# Patient Record
Sex: FEMALE | Race: WHITE | NOT HISPANIC OR LATINO | Employment: OTHER | URBAN - METROPOLITAN AREA
[De-identification: names, ages, dates, MRNs, and addresses within clinical notes are randomized per-mention and may not be internally consistent; named-entity substitution may affect disease eponyms.]

---

## 2017-02-07 ENCOUNTER — OUTSIDE FACILITY SERVICE (OUTPATIENT)
Dept: FAMILY MEDICINE CLINIC | Facility: CLINIC | Age: 82
End: 2017-02-07

## 2017-02-07 PROCEDURE — 99308 SBSQ NF CARE LOW MDM 20: CPT | Performed by: NURSE PRACTITIONER

## 2017-02-28 ENCOUNTER — OUTSIDE FACILITY SERVICE (OUTPATIENT)
Dept: FAMILY MEDICINE CLINIC | Facility: CLINIC | Age: 82
End: 2017-02-28

## 2017-02-28 PROCEDURE — 99308 SBSQ NF CARE LOW MDM 20: CPT | Performed by: INTERNAL MEDICINE

## 2017-03-20 ENCOUNTER — OUTSIDE FACILITY SERVICE (OUTPATIENT)
Dept: FAMILY MEDICINE CLINIC | Facility: CLINIC | Age: 82
End: 2017-03-20

## 2017-03-20 PROCEDURE — 99308 SBSQ NF CARE LOW MDM 20: CPT | Performed by: NURSE PRACTITIONER

## 2017-04-21 ENCOUNTER — OUTSIDE FACILITY SERVICE (OUTPATIENT)
Dept: FAMILY MEDICINE CLINIC | Facility: CLINIC | Age: 82
End: 2017-04-21

## 2017-04-21 PROCEDURE — 99308 SBSQ NF CARE LOW MDM 20: CPT | Performed by: NURSE PRACTITIONER

## 2017-04-25 ENCOUNTER — OUTSIDE FACILITY SERVICE (OUTPATIENT)
Dept: FAMILY MEDICINE CLINIC | Facility: CLINIC | Age: 82
End: 2017-04-25

## 2017-04-25 PROCEDURE — 99308 SBSQ NF CARE LOW MDM 20: CPT | Performed by: INTERNAL MEDICINE

## 2017-06-15 ENCOUNTER — OUTSIDE FACILITY SERVICE (OUTPATIENT)
Dept: FAMILY MEDICINE CLINIC | Facility: CLINIC | Age: 82
End: 2017-06-15

## 2017-06-15 PROCEDURE — 99307 SBSQ NF CARE SF MDM 10: CPT | Performed by: NURSE PRACTITIONER

## 2017-08-02 ENCOUNTER — APPOINTMENT (OUTPATIENT)
Dept: WOMENS IMAGING | Facility: HOSPITAL | Age: 82
End: 2017-08-02

## 2017-08-02 PROCEDURE — 77063 BREAST TOMOSYNTHESIS BI: CPT | Performed by: RADIOLOGY

## 2017-08-02 PROCEDURE — G0202 SCR MAMMO BI INCL CAD: HCPCS | Performed by: RADIOLOGY

## 2017-10-26 ENCOUNTER — APPOINTMENT (OUTPATIENT)
Dept: CARDIOLOGY | Facility: HOSPITAL | Age: 82
End: 2017-10-26
Attending: PSYCHIATRY & NEUROLOGY

## 2017-10-26 ENCOUNTER — APPOINTMENT (OUTPATIENT)
Dept: CT IMAGING | Facility: HOSPITAL | Age: 82
End: 2017-10-26

## 2017-10-26 ENCOUNTER — HOSPITAL ENCOUNTER (INPATIENT)
Facility: HOSPITAL | Age: 82
LOS: 10 days | Discharge: NURSING, REHAB OR LONG-TERM CARE | End: 2017-11-05
Attending: EMERGENCY MEDICINE | Admitting: INTERNAL MEDICINE

## 2017-10-26 ENCOUNTER — APPOINTMENT (OUTPATIENT)
Dept: GENERAL RADIOLOGY | Facility: HOSPITAL | Age: 82
End: 2017-10-26

## 2017-10-26 DIAGNOSIS — K76.82 HEPATIC ENCEPHALOPATHY (HCC): Primary | ICD-10-CM

## 2017-10-26 DIAGNOSIS — R53.1 GENERALIZED WEAKNESS: ICD-10-CM

## 2017-10-26 PROBLEM — I10 HYPERTENSION: Status: ACTIVE | Noted: 2017-10-26

## 2017-10-26 PROBLEM — K74.60 CIRRHOSIS OF LIVER (HCC): Status: ACTIVE | Noted: 2017-10-26

## 2017-10-26 PROBLEM — I48.91 ATRIAL FIBRILLATION (HCC): Status: ACTIVE | Noted: 2017-10-26

## 2017-10-26 PROBLEM — N39.0 UTI (URINARY TRACT INFECTION): Status: ACTIVE | Noted: 2017-10-26

## 2017-10-26 LAB
ALBUMIN SERPL-MCNC: 3.3 G/DL (ref 3.5–5.2)
ALBUMIN/GLOB SERPL: 0.8 G/DL
ALP SERPL-CCNC: 142 U/L (ref 39–117)
ALT SERPL W P-5'-P-CCNC: 17 U/L (ref 1–33)
AMMONIA BLD-SCNC: 138 UMOL/L (ref 11–51)
ANION GAP SERPL CALCULATED.3IONS-SCNC: 12.3 MMOL/L
APTT PPP: 34.6 SECONDS (ref 22.7–35.4)
AST SERPL-CCNC: 31 U/L (ref 1–32)
BACTERIA UR QL AUTO: ABNORMAL /HPF
BASOPHILS # BLD AUTO: 0.04 10*3/MM3 (ref 0–0.2)
BASOPHILS NFR BLD AUTO: 0.5 % (ref 0–1.5)
BH CV XLRA MEAS LEFT DIST CCA EDV: 16.7 CM/SEC
BH CV XLRA MEAS LEFT DIST CCA PSV: 87.4 CM/SEC
BH CV XLRA MEAS LEFT DIST ICA EDV: -14.7 CM/SEC
BH CV XLRA MEAS LEFT DIST ICA PSV: -71.7 CM/SEC
BH CV XLRA MEAS LEFT ICA/CCA RATIO: 0.8
BH CV XLRA MEAS LEFT MID ICA EDV: -10.8 CM/SEC
BH CV XLRA MEAS LEFT MID ICA PSV: -72.7 CM/SEC
BH CV XLRA MEAS LEFT PROX CCA EDV: 10.8 CM/SEC
BH CV XLRA MEAS LEFT PROX CCA PSV: 92.3 CM/SEC
BH CV XLRA MEAS LEFT PROX ECA PSV: -120 CM/SEC
BH CV XLRA MEAS LEFT PROX ICA EDV: -14.7 CM/SEC
BH CV XLRA MEAS LEFT PROX ICA PSV: -64.8 CM/SEC
BH CV XLRA MEAS LEFT PROX SCLA PSV: 170 CM/SEC
BH CV XLRA MEAS LEFT VERTEBRAL A EDV: 10.8 CM/SEC
BH CV XLRA MEAS LEFT VERTEBRAL A PSV: 49.1 CM/SEC
BH CV XLRA MEAS RIGHT DIST CCA EDV: 13.8 CM/SEC
BH CV XLRA MEAS RIGHT DIST CCA PSV: 85.4 CM/SEC
BH CV XLRA MEAS RIGHT ICA/CCA RATIO: 0.7
BH CV XLRA MEAS RIGHT MID ICA EDV: -14.7 CM/SEC
BH CV XLRA MEAS RIGHT MID ICA PSV: -54 CM/SEC
BH CV XLRA MEAS RIGHT PROX CCA EDV: 13.8 CM/SEC
BH CV XLRA MEAS RIGHT PROX CCA PSV: 124 CM/SEC
BH CV XLRA MEAS RIGHT PROX ECA EDV: -12.8 CM/SEC
BH CV XLRA MEAS RIGHT PROX ECA PSV: -126 CM/SEC
BH CV XLRA MEAS RIGHT PROX ICA EDV: -9.8 CM/SEC
BH CV XLRA MEAS RIGHT PROX ICA PSV: -61.9 CM/SEC
BH CV XLRA MEAS RIGHT PROX SCLA PSV: 141 CM/SEC
BH CV XLRA MEAS RIGHT VERTEBRAL A EDV: 11.8 CM/SEC
BH CV XLRA MEAS RIGHT VERTEBRAL A PSV: 48.1 CM/SEC
BILIRUB SERPL-MCNC: 1.5 MG/DL (ref 0.1–1.2)
BILIRUB UR QL STRIP: NEGATIVE
BUN BLD-MCNC: 16 MG/DL (ref 8–23)
BUN/CREAT SERPL: 15.4 (ref 7–25)
CALCIUM SPEC-SCNC: 10.1 MG/DL (ref 8.6–10.5)
CHLORIDE SERPL-SCNC: 103 MMOL/L (ref 98–107)
CHOLEST SERPL-MCNC: 190 MG/DL (ref 0–200)
CLARITY UR: ABNORMAL
CO2 SERPL-SCNC: 26.7 MMOL/L (ref 22–29)
COLOR UR: YELLOW
CREAT BLD-MCNC: 1.04 MG/DL (ref 0.57–1)
DEPRECATED RDW RBC AUTO: 51.7 FL (ref 37–54)
EOSINOPHIL # BLD AUTO: 0.47 10*3/MM3 (ref 0–0.7)
EOSINOPHIL NFR BLD AUTO: 5.7 % (ref 0.3–6.2)
ERYTHROCYTE [DISTWIDTH] IN BLOOD BY AUTOMATED COUNT: 13.9 % (ref 11.7–13)
GFR SERPL CREATININE-BSD FRML MDRD: 51 ML/MIN/1.73
GLOBULIN UR ELPH-MCNC: 4 GM/DL
GLUCOSE BLD-MCNC: 103 MG/DL (ref 65–99)
GLUCOSE BLDC GLUCOMTR-MCNC: 113 MG/DL (ref 70–130)
GLUCOSE BLDC GLUCOMTR-MCNC: 131 MG/DL (ref 70–130)
GLUCOSE UR STRIP-MCNC: NEGATIVE MG/DL
HBA1C MFR BLD: 5.17 % (ref 4.8–5.6)
HCT VFR BLD AUTO: 38.7 % (ref 35.6–45.5)
HDLC SERPL-MCNC: 75 MG/DL (ref 40–60)
HGB BLD-MCNC: 13.2 G/DL (ref 11.9–15.5)
HGB UR QL STRIP.AUTO: ABNORMAL
HOLD SPECIMEN: NORMAL
HOLD SPECIMEN: NORMAL
HYALINE CASTS UR QL AUTO: ABNORMAL /LPF
IMM GRANULOCYTES # BLD: 0.03 10*3/MM3 (ref 0–0.03)
IMM GRANULOCYTES NFR BLD: 0.4 % (ref 0–0.5)
INR PPP: 1.39 (ref 0.9–1.1)
KETONES UR QL STRIP: NEGATIVE
LDLC SERPL CALC-MCNC: 105 MG/DL (ref 0–100)
LDLC/HDLC SERPL: 1.4 {RATIO}
LEFT ARM BP: NORMAL MMHG
LEUKOCYTE ESTERASE UR QL STRIP.AUTO: ABNORMAL
LYMPHOCYTES # BLD AUTO: 2.5 10*3/MM3 (ref 0.9–4.8)
LYMPHOCYTES NFR BLD AUTO: 30.3 % (ref 19.6–45.3)
MCH RBC QN AUTO: 34.6 PG (ref 26.9–32)
MCHC RBC AUTO-ENTMCNC: 34.1 G/DL (ref 32.4–36.3)
MCV RBC AUTO: 101.3 FL (ref 80.5–98.2)
MONOCYTES # BLD AUTO: 1.12 10*3/MM3 (ref 0.2–1.2)
MONOCYTES NFR BLD AUTO: 13.6 % (ref 5–12)
NEUTROPHILS # BLD AUTO: 4.09 10*3/MM3 (ref 1.9–8.1)
NEUTROPHILS NFR BLD AUTO: 49.5 % (ref 42.7–76)
NITRITE UR QL STRIP: NEGATIVE
NRBC BLD MANUAL-RTO: 0 /100 WBC (ref 0–0)
PH UR STRIP.AUTO: 8 [PH] (ref 5–8)
PLATELET # BLD AUTO: 161 10*3/MM3 (ref 140–500)
PMV BLD AUTO: 11.3 FL (ref 6–12)
POTASSIUM BLD-SCNC: 3.9 MMOL/L (ref 3.5–5.2)
PROT SERPL-MCNC: 7.3 G/DL (ref 6–8.5)
PROT UR QL STRIP: ABNORMAL
PROTHROMBIN TIME: 16.5 SECONDS (ref 11.7–14.2)
RBC # BLD AUTO: 3.82 10*6/MM3 (ref 3.9–5.2)
RBC # UR: ABNORMAL /HPF
REF LAB TEST METHOD: ABNORMAL
RIGHT ARM BP: NORMAL MMHG
SODIUM BLD-SCNC: 142 MMOL/L (ref 136–145)
SP GR UR STRIP: 1.01 (ref 1–1.03)
SQUAMOUS #/AREA URNS HPF: ABNORMAL /HPF
TRIGL SERPL-MCNC: 50 MG/DL (ref 0–150)
TROPONIN T SERPL-MCNC: <0.01 NG/ML (ref 0–0.03)
TSH SERPL DL<=0.05 MIU/L-ACNC: 2.34 MIU/ML (ref 0.27–4.2)
UROBILINOGEN UR QL STRIP: ABNORMAL
VIT B12 BLD-MCNC: 866 PG/ML (ref 211–946)
VLDLC SERPL-MCNC: 10 MG/DL (ref 5–40)
WBC NRBC COR # BLD: 8.25 10*3/MM3 (ref 4.5–10.7)
WBC UR QL AUTO: ABNORMAL /HPF
WHOLE BLOOD HOLD SPECIMEN: NORMAL
WHOLE BLOOD HOLD SPECIMEN: NORMAL

## 2017-10-26 PROCEDURE — 87186 SC STD MICRODIL/AGAR DIL: CPT | Performed by: EMERGENCY MEDICINE

## 2017-10-26 PROCEDURE — 99285 EMERGENCY DEPT VISIT HI MDM: CPT

## 2017-10-26 PROCEDURE — 84484 ASSAY OF TROPONIN QUANT: CPT | Performed by: EMERGENCY MEDICINE

## 2017-10-26 PROCEDURE — 82607 VITAMIN B-12: CPT | Performed by: PSYCHIATRY & NEUROLOGY

## 2017-10-26 PROCEDURE — 82140 ASSAY OF AMMONIA: CPT | Performed by: EMERGENCY MEDICINE

## 2017-10-26 PROCEDURE — 71010 HC CHEST PA OR AP: CPT

## 2017-10-26 PROCEDURE — 70450 CT HEAD/BRAIN W/O DYE: CPT

## 2017-10-26 PROCEDURE — 93005 ELECTROCARDIOGRAM TRACING: CPT | Performed by: EMERGENCY MEDICINE

## 2017-10-26 PROCEDURE — 80053 COMPREHEN METABOLIC PANEL: CPT | Performed by: EMERGENCY MEDICINE

## 2017-10-26 PROCEDURE — 85730 THROMBOPLASTIN TIME PARTIAL: CPT | Performed by: EMERGENCY MEDICINE

## 2017-10-26 PROCEDURE — 85025 COMPLETE CBC W/AUTO DIFF WBC: CPT | Performed by: EMERGENCY MEDICINE

## 2017-10-26 PROCEDURE — 99222 1ST HOSP IP/OBS MODERATE 55: CPT | Performed by: PSYCHIATRY & NEUROLOGY

## 2017-10-26 PROCEDURE — 93010 ELECTROCARDIOGRAM REPORT: CPT | Performed by: INTERNAL MEDICINE

## 2017-10-26 PROCEDURE — 81001 URINALYSIS AUTO W/SCOPE: CPT | Performed by: EMERGENCY MEDICINE

## 2017-10-26 PROCEDURE — 83036 HEMOGLOBIN GLYCOSYLATED A1C: CPT | Performed by: PSYCHIATRY & NEUROLOGY

## 2017-10-26 PROCEDURE — 85610 PROTHROMBIN TIME: CPT | Performed by: EMERGENCY MEDICINE

## 2017-10-26 PROCEDURE — 82962 GLUCOSE BLOOD TEST: CPT

## 2017-10-26 PROCEDURE — 92610 EVALUATE SWALLOWING FUNCTION: CPT

## 2017-10-26 PROCEDURE — 80061 LIPID PANEL: CPT | Performed by: PSYCHIATRY & NEUROLOGY

## 2017-10-26 PROCEDURE — 84443 ASSAY THYROID STIM HORMONE: CPT | Performed by: PSYCHIATRY & NEUROLOGY

## 2017-10-26 PROCEDURE — 93880 EXTRACRANIAL BILAT STUDY: CPT

## 2017-10-26 PROCEDURE — 25010000002 LEVOFLOXACIN PER 250 MG: Performed by: INTERNAL MEDICINE

## 2017-10-26 PROCEDURE — 87086 URINE CULTURE/COLONY COUNT: CPT | Performed by: EMERGENCY MEDICINE

## 2017-10-26 RX ORDER — MULTIPLE VITAMINS W/ MINERALS TAB 9MG-400MCG
1 TAB ORAL DAILY
Status: DISCONTINUED | OUTPATIENT
Start: 2017-10-27 | End: 2017-11-05 | Stop reason: HOSPADM

## 2017-10-26 RX ORDER — DEXTROSE MONOHYDRATE 25 G/50ML
INJECTION, SOLUTION INTRAVENOUS
Status: COMPLETED
Start: 2017-10-26 | End: 2017-10-26

## 2017-10-26 RX ORDER — LACTULOSE 10 G/15ML
30 SOLUTION ORAL 3 TIMES DAILY
Status: DISCONTINUED | OUTPATIENT
Start: 2017-10-26 | End: 2017-10-26 | Stop reason: SDUPTHER

## 2017-10-26 RX ORDER — ONDANSETRON 8 MG/1
8 TABLET, ORALLY DISINTEGRATING ORAL EVERY 8 HOURS PRN
COMMUNITY
End: 2017-11-05 | Stop reason: HOSPADM

## 2017-10-26 RX ORDER — LORATADINE 10 MG/1
10 CAPSULE, LIQUID FILLED ORAL DAILY
COMMUNITY
End: 2017-11-05 | Stop reason: HOSPADM

## 2017-10-26 RX ORDER — METOPROLOL SUCCINATE 25 MG/1
12.5 TABLET, EXTENDED RELEASE ORAL EVERY 12 HOURS
COMMUNITY

## 2017-10-26 RX ORDER — LATANOPROST 50 UG/ML
1 SOLUTION/ DROPS OPHTHALMIC NIGHTLY
COMMUNITY
End: 2019-01-04

## 2017-10-26 RX ORDER — ACETAMINOPHEN 325 MG/1
650 TABLET ORAL EVERY 6 HOURS PRN
COMMUNITY
End: 2017-11-05 | Stop reason: HOSPADM

## 2017-10-26 RX ORDER — POTASSIUM CHLORIDE 750 MG/1
20 TABLET, FILM COATED, EXTENDED RELEASE ORAL 2 TIMES DAILY
COMMUNITY
End: 2017-11-05 | Stop reason: HOSPADM

## 2017-10-26 RX ORDER — CHOLECALCIFEROL (VITAMIN D3) 1250 MCG
50000 CAPSULE ORAL
COMMUNITY
End: 2019-01-04

## 2017-10-26 RX ORDER — METOPROLOL SUCCINATE 25 MG/1
12.5 TABLET, EXTENDED RELEASE ORAL EVERY 12 HOURS
Status: DISCONTINUED | OUTPATIENT
Start: 2017-10-26 | End: 2017-11-05 | Stop reason: HOSPADM

## 2017-10-26 RX ORDER — LATANOPROST 50 UG/ML
1 SOLUTION/ DROPS OPHTHALMIC NIGHTLY
Status: DISCONTINUED | OUTPATIENT
Start: 2017-10-26 | End: 2017-11-05 | Stop reason: HOSPADM

## 2017-10-26 RX ORDER — ACETAMINOPHEN 325 MG/1
650 TABLET ORAL EVERY 6 HOURS PRN
Status: DISCONTINUED | OUTPATIENT
Start: 2017-10-26 | End: 2017-11-05 | Stop reason: HOSPADM

## 2017-10-26 RX ORDER — LOSARTAN POTASSIUM 50 MG/1
75 TABLET ORAL DAILY
COMMUNITY
End: 2017-11-05 | Stop reason: HOSPADM

## 2017-10-26 RX ORDER — ONDANSETRON 2 MG/ML
4 INJECTION INTRAMUSCULAR; INTRAVENOUS EVERY 6 HOURS PRN
Status: DISCONTINUED | OUTPATIENT
Start: 2017-10-26 | End: 2017-11-05 | Stop reason: HOSPADM

## 2017-10-26 RX ORDER — FUROSEMIDE 40 MG/1
40 TABLET ORAL EVERY OTHER DAY
COMMUNITY
End: 2017-11-05 | Stop reason: HOSPADM

## 2017-10-26 RX ORDER — LACTULOSE 10 G/15ML
30 SOLUTION ORAL 3 TIMES DAILY
Status: DISCONTINUED | OUTPATIENT
Start: 2017-10-26 | End: 2017-11-05 | Stop reason: HOSPADM

## 2017-10-26 RX ORDER — OMEPRAZOLE 20 MG/1
20 CAPSULE, DELAYED RELEASE ORAL DAILY
COMMUNITY
End: 2019-01-04

## 2017-10-26 RX ORDER — DEXTROSE MONOHYDRATE 25 G/50ML
25 INJECTION, SOLUTION INTRAVENOUS ONCE
Status: COMPLETED | OUTPATIENT
Start: 2017-10-26 | End: 2017-10-26

## 2017-10-26 RX ORDER — SODIUM CHLORIDE 0.9 % (FLUSH) 0.9 %
10 SYRINGE (ML) INJECTION AS NEEDED
Status: DISCONTINUED | OUTPATIENT
Start: 2017-10-26 | End: 2017-11-05 | Stop reason: HOSPADM

## 2017-10-26 RX ORDER — METOLAZONE 5 MG/1
5 TABLET ORAL DAILY
COMMUNITY
End: 2017-11-05 | Stop reason: HOSPADM

## 2017-10-26 RX ORDER — PANTOPRAZOLE SODIUM 40 MG/1
40 TABLET, DELAYED RELEASE ORAL EVERY MORNING
Status: DISCONTINUED | OUTPATIENT
Start: 2017-10-27 | End: 2017-11-05 | Stop reason: HOSPADM

## 2017-10-26 RX ORDER — LEVOFLOXACIN 5 MG/ML
500 INJECTION, SOLUTION INTRAVENOUS EVERY 24 HOURS
Status: COMPLETED | OUTPATIENT
Start: 2017-10-26 | End: 2017-10-27

## 2017-10-26 RX ORDER — LACTULOSE 10 G/15ML
20 SOLUTION ORAL 3 TIMES DAILY
COMMUNITY
End: 2017-11-05 | Stop reason: HOSPADM

## 2017-10-26 RX ADMIN — METOPROLOL SUCCINATE 12.5 MG: 25 TABLET, FILM COATED, EXTENDED RELEASE ORAL at 21:54

## 2017-10-26 RX ADMIN — RIFAXIMIN 550 MG: 550 TABLET ORAL at 21:54

## 2017-10-26 RX ADMIN — APIXABAN 5 MG: 2.5 TABLET, FILM COATED ORAL at 22:18

## 2017-10-26 RX ADMIN — LACTULOSE 30 G: 10 SOLUTION ORAL at 21:55

## 2017-10-26 RX ADMIN — DEXTROSE MONOHYDRATE 25 ML: 25 INJECTION, SOLUTION INTRAVENOUS at 12:25

## 2017-10-26 RX ADMIN — LEVOFLOXACIN 500 MG: 5 INJECTION, SOLUTION INTRAVENOUS at 16:37

## 2017-10-26 RX ADMIN — LACTULOSE: 10 SOLUTION ORAL at 13:26

## 2017-10-27 ENCOUNTER — APPOINTMENT (OUTPATIENT)
Dept: CARDIOLOGY | Facility: HOSPITAL | Age: 82
End: 2017-10-27
Attending: PSYCHIATRY & NEUROLOGY

## 2017-10-27 PROBLEM — Z66 DNR (DO NOT RESUSCITATE): Status: ACTIVE | Noted: 2017-10-27

## 2017-10-27 LAB
BH CV ECHO MEAS - AO MEAN PG (FULL): 3 MMHG
BH CV ECHO MEAS - AO MEAN PG: 6 MMHG
BH CV ECHO MEAS - AO V2 MAX: 178 CM/SEC
BH CV ECHO MEAS - AO V2 MEAN: 113 CM/SEC
BH CV ECHO MEAS - AO V2 VTI: 35.2 CM
BH CV ECHO MEAS - AVA(I,A): 2.1 CM^2
BH CV ECHO MEAS - AVA(I,D): 2.1 CM^2
BH CV ECHO MEAS - BSA(HAYCOCK): 2 M^2
BH CV ECHO MEAS - BSA: 1.9 M^2
BH CV ECHO MEAS - BZI_BMI: 36.2 KILOGRAMS/M^2
BH CV ECHO MEAS - BZI_METRIC_HEIGHT: 157.5 CM
BH CV ECHO MEAS - BZI_METRIC_WEIGHT: 89.8 KG
BH CV ECHO MEAS - CONTRAST EF (2CH): 64.6 ML/M^2
BH CV ECHO MEAS - CONTRAST EF 4CH: 65.8 ML/M^2
BH CV ECHO MEAS - EDV(MOD-SP2): 48 ML
BH CV ECHO MEAS - EDV(MOD-SP4): 76 ML
BH CV ECHO MEAS - EF(MOD-SP2): 64.6 %
BH CV ECHO MEAS - EF(MOD-SP4): 65.8 %
BH CV ECHO MEAS - ESV(MOD-SP2): 17 ML
BH CV ECHO MEAS - ESV(MOD-SP4): 26 ML
BH CV ECHO MEAS - LAT PEAK E' VEL: 7 CM/SEC
BH CV ECHO MEAS - LV DIASTOLIC VOL/BSA (35-75): 39.9 ML/M^2
BH CV ECHO MEAS - LV MEAN PG: 3 MMHG
BH CV ECHO MEAS - LV SYSTOLIC VOL/BSA (12-30): 13.7 ML/M^2
BH CV ECHO MEAS - LV V1 MAX: 150 CM/SEC
BH CV ECHO MEAS - LV V1 MEAN: 84.9 CM/SEC
BH CV ECHO MEAS - LV V1 VTI: 32.3 CM
BH CV ECHO MEAS - LVLD AP2: 7.4 CM
BH CV ECHO MEAS - LVLD AP4: 8 CM
BH CV ECHO MEAS - LVLS AP2: 6.3 CM
BH CV ECHO MEAS - LVLS AP4: 6 CM
BH CV ECHO MEAS - LVOT AREA (M): 2.3 CM^2
BH CV ECHO MEAS - LVOT AREA: 2.3 CM^2
BH CV ECHO MEAS - LVOT DIAM: 1.7 CM
BH CV ECHO MEAS - MED PEAK E' VEL: 7 CM/SEC
BH CV ECHO MEAS - MV A DUR: 148 SEC
BH CV ECHO MEAS - MV A MAX VEL: 96.7 CM/SEC
BH CV ECHO MEAS - MV DEC SLOPE: 400 CM/SEC^2
BH CV ECHO MEAS - MV DEC TIME: 232 SEC
BH CV ECHO MEAS - MV E MAX VEL: 148 CM/SEC
BH CV ECHO MEAS - MV E/A: 1.5
BH CV ECHO MEAS - MV MEAN PG: 3 MMHG
BH CV ECHO MEAS - MV P1/2T MAX VEL: 148 CM/SEC
BH CV ECHO MEAS - MV P1/2T: 108.4 MSEC
BH CV ECHO MEAS - MV V2 MEAN: 71.8 CM/SEC
BH CV ECHO MEAS - MV V2 VTI: 56.8 CM
BH CV ECHO MEAS - MVA P1/2T LCG: 1.5 CM^2
BH CV ECHO MEAS - MVA(P1/2T): 2 CM^2
BH CV ECHO MEAS - MVA(VTI): 1.3 CM^2
BH CV ECHO MEAS - PULM A REVS DUR: 95 SEC
BH CV ECHO MEAS - PULM A REVS VEL: 42 CM/SEC
BH CV ECHO MEAS - PULM DIAS VEL: 50.3 CM/SEC
BH CV ECHO MEAS - PULM S/D: 1.3
BH CV ECHO MEAS - PULM SYS VEL: 65.6 CM/SEC
BH CV ECHO MEAS - RAP SYSTOLE: 8 MMHG
BH CV ECHO MEAS - RVSP: 38 MMHG
BH CV ECHO MEAS - SI(LVOT): 38.5 ML/M^2
BH CV ECHO MEAS - SI(MOD-SP2): 16.3 ML/M^2
BH CV ECHO MEAS - SI(MOD-SP4): 26.3 ML/M^2
BH CV ECHO MEAS - SV(LVOT): 73.3 ML
BH CV ECHO MEAS - SV(MOD-SP2): 31 ML
BH CV ECHO MEAS - SV(MOD-SP4): 50 ML
BH CV ECHO MEAS - TAPSE (>1.6): 2.5 CM2
BH CV ECHO MEAS - TR MAX VEL: 276 CM/SEC
BH CV VAS BP RIGHT ARM: NORMAL MMHG
BH CV XLRA - RV BASE: 4.3 CM
BH CV XLRA - TDI S': 13 CM/SEC
E/E' RATIO: 22
GLUCOSE BLDC GLUCOMTR-MCNC: 108 MG/DL (ref 70–130)
LEFT ATRIUM VOLUME INDEX: 23.7 ML/M2
LV EF 2D ECHO EST: 66 %

## 2017-10-27 PROCEDURE — 93306 TTE W/DOPPLER COMPLETE: CPT

## 2017-10-27 PROCEDURE — 93303 ECHO TRANSTHORACIC: CPT | Performed by: INTERNAL MEDICINE

## 2017-10-27 PROCEDURE — 92526 ORAL FUNCTION THERAPY: CPT | Performed by: SPEECH-LANGUAGE PATHOLOGIST

## 2017-10-27 PROCEDURE — 99232 SBSQ HOSP IP/OBS MODERATE 35: CPT | Performed by: NURSE PRACTITIONER

## 2017-10-27 PROCEDURE — 25010000002 LEVOFLOXACIN PER 250 MG: Performed by: INTERNAL MEDICINE

## 2017-10-27 PROCEDURE — 82962 GLUCOSE BLOOD TEST: CPT

## 2017-10-27 RX ORDER — LEVOFLOXACIN 500 MG/1
500 TABLET, FILM COATED ORAL DAILY
Status: DISCONTINUED | OUTPATIENT
Start: 2017-10-28 | End: 2017-10-28

## 2017-10-27 RX ADMIN — LACTULOSE 30 G: 10 SOLUTION ORAL at 18:06

## 2017-10-27 RX ADMIN — METOPROLOL SUCCINATE 12.5 MG: 25 TABLET, FILM COATED, EXTENDED RELEASE ORAL at 21:58

## 2017-10-27 RX ADMIN — LEVOFLOXACIN 500 MG: 5 INJECTION, SOLUTION INTRAVENOUS at 18:06

## 2017-10-27 RX ADMIN — LACTULOSE 30 G: 10 SOLUTION ORAL at 09:37

## 2017-10-27 RX ADMIN — RIFAXIMIN 550 MG: 550 TABLET ORAL at 09:37

## 2017-10-27 RX ADMIN — APIXABAN 5 MG: 2.5 TABLET, FILM COATED ORAL at 18:06

## 2017-10-27 RX ADMIN — MULTIPLE VITAMINS W/ MINERALS TAB 1 TABLET: TAB at 09:37

## 2017-10-27 RX ADMIN — RIFAXIMIN 550 MG: 550 TABLET ORAL at 21:59

## 2017-10-27 RX ADMIN — METOPROLOL SUCCINATE 12.5 MG: 25 TABLET, FILM COATED, EXTENDED RELEASE ORAL at 09:37

## 2017-10-27 RX ADMIN — LACTULOSE 30 G: 10 SOLUTION ORAL at 21:58

## 2017-10-27 RX ADMIN — LATANOPROST 1 DROP: 50 SOLUTION OPHTHALMIC at 00:50

## 2017-10-27 RX ADMIN — PANTOPRAZOLE SODIUM 40 MG: 40 TABLET, DELAYED RELEASE ORAL at 06:43

## 2017-10-27 RX ADMIN — LATANOPROST 1 DROP: 50 SOLUTION OPHTHALMIC at 21:58

## 2017-10-27 RX ADMIN — APIXABAN 5 MG: 2.5 TABLET, FILM COATED ORAL at 09:37

## 2017-10-28 LAB
ALBUMIN SERPL-MCNC: 2.8 G/DL (ref 3.5–5.2)
ALBUMIN/GLOB SERPL: 0.8 G/DL
ALP SERPL-CCNC: 95 U/L (ref 39–117)
ALT SERPL W P-5'-P-CCNC: 14 U/L (ref 1–33)
AMMONIA BLD-SCNC: 60 UMOL/L (ref 11–51)
ANION GAP SERPL CALCULATED.3IONS-SCNC: 15.7 MMOL/L
AST SERPL-CCNC: 24 U/L (ref 1–32)
BACTERIA SPEC AEROBE CULT: ABNORMAL
BACTERIA SPEC AEROBE CULT: ABNORMAL
BILIRUB SERPL-MCNC: 1.7 MG/DL (ref 0.1–1.2)
BUN BLD-MCNC: 21 MG/DL (ref 8–23)
BUN/CREAT SERPL: 17.8 (ref 7–25)
CALCIUM SPEC-SCNC: 10 MG/DL (ref 8.6–10.5)
CHLORIDE SERPL-SCNC: 106 MMOL/L (ref 98–107)
CO2 SERPL-SCNC: 23.3 MMOL/L (ref 22–29)
CREAT BLD-MCNC: 1.18 MG/DL (ref 0.57–1)
DEPRECATED RDW RBC AUTO: 52 FL (ref 37–54)
ERYTHROCYTE [DISTWIDTH] IN BLOOD BY AUTOMATED COUNT: 13.7 % (ref 11.7–13)
GFR SERPL CREATININE-BSD FRML MDRD: 44 ML/MIN/1.73
GLOBULIN UR ELPH-MCNC: 3.7 GM/DL
GLUCOSE BLD-MCNC: 139 MG/DL (ref 65–99)
GLUCOSE BLDC GLUCOMTR-MCNC: 104 MG/DL (ref 70–130)
GLUCOSE BLDC GLUCOMTR-MCNC: 121 MG/DL (ref 70–130)
GLUCOSE BLDC GLUCOMTR-MCNC: 127 MG/DL (ref 70–130)
GLUCOSE BLDC GLUCOMTR-MCNC: 136 MG/DL (ref 70–130)
GLUCOSE BLDC GLUCOMTR-MCNC: 138 MG/DL (ref 70–130)
HCT VFR BLD AUTO: 33.2 % (ref 35.6–45.5)
HGB BLD-MCNC: 11.1 G/DL (ref 11.9–15.5)
MCH RBC QN AUTO: 34.7 PG (ref 26.9–32)
MCHC RBC AUTO-ENTMCNC: 33.4 G/DL (ref 32.4–36.3)
MCV RBC AUTO: 103.8 FL (ref 80.5–98.2)
PLATELET # BLD AUTO: 156 10*3/MM3 (ref 140–500)
PMV BLD AUTO: 10.9 FL (ref 6–12)
POTASSIUM BLD-SCNC: 3.3 MMOL/L (ref 3.5–5.2)
PROT SERPL-MCNC: 6.5 G/DL (ref 6–8.5)
RBC # BLD AUTO: 3.2 10*6/MM3 (ref 3.9–5.2)
SODIUM BLD-SCNC: 145 MMOL/L (ref 136–145)
WBC NRBC COR # BLD: 13.97 10*3/MM3 (ref 4.5–10.7)

## 2017-10-28 PROCEDURE — 82962 GLUCOSE BLOOD TEST: CPT

## 2017-10-28 PROCEDURE — 99222 1ST HOSP IP/OBS MODERATE 55: CPT | Performed by: INTERNAL MEDICINE

## 2017-10-28 PROCEDURE — 85027 COMPLETE CBC AUTOMATED: CPT | Performed by: INTERNAL MEDICINE

## 2017-10-28 PROCEDURE — 25010000002 ERTAPENEM PER 500 MG: Performed by: HOSPITALIST

## 2017-10-28 PROCEDURE — 80053 COMPREHEN METABOLIC PANEL: CPT | Performed by: INTERNAL MEDICINE

## 2017-10-28 PROCEDURE — 82140 ASSAY OF AMMONIA: CPT | Performed by: INTERNAL MEDICINE

## 2017-10-28 RX ORDER — POTASSIUM CHLORIDE 1.5 G/1.77G
20 POWDER, FOR SOLUTION ORAL ONCE
Status: COMPLETED | OUTPATIENT
Start: 2017-10-28 | End: 2017-10-28

## 2017-10-28 RX ADMIN — METOPROLOL SUCCINATE 12.5 MG: 25 TABLET, FILM COATED, EXTENDED RELEASE ORAL at 22:07

## 2017-10-28 RX ADMIN — PANTOPRAZOLE SODIUM 40 MG: 40 TABLET, DELAYED RELEASE ORAL at 07:05

## 2017-10-28 RX ADMIN — APIXABAN 5 MG: 2.5 TABLET, FILM COATED ORAL at 16:53

## 2017-10-28 RX ADMIN — METOPROLOL SUCCINATE 12.5 MG: 25 TABLET, FILM COATED, EXTENDED RELEASE ORAL at 09:27

## 2017-10-28 RX ADMIN — RIFAXIMIN 550 MG: 550 TABLET ORAL at 22:10

## 2017-10-28 RX ADMIN — Medication 10 ML: at 22:10

## 2017-10-28 RX ADMIN — RIFAXIMIN 550 MG: 550 TABLET ORAL at 09:27

## 2017-10-28 RX ADMIN — LEVOFLOXACIN 500 MG: 500 TABLET, FILM COATED ORAL at 09:27

## 2017-10-28 RX ADMIN — WATER 1 G: 1 INJECTION INTRAMUSCULAR; INTRAVENOUS; SUBCUTANEOUS at 18:16

## 2017-10-28 RX ADMIN — LACTULOSE 30 G: 10 SOLUTION ORAL at 09:28

## 2017-10-28 RX ADMIN — LACTULOSE 30 G: 10 SOLUTION ORAL at 16:53

## 2017-10-28 RX ADMIN — LACTULOSE 30 G: 10 SOLUTION ORAL at 22:07

## 2017-10-28 RX ADMIN — LATANOPROST 1 DROP: 50 SOLUTION OPHTHALMIC at 22:07

## 2017-10-28 RX ADMIN — POTASSIUM CHLORIDE 20 MEQ: 1.5 POWDER, FOR SOLUTION ORAL at 16:54

## 2017-10-28 RX ADMIN — APIXABAN 5 MG: 2.5 TABLET, FILM COATED ORAL at 09:27

## 2017-10-28 RX ADMIN — ACETAMINOPHEN 650 MG: 325 TABLET ORAL at 02:52

## 2017-10-28 RX ADMIN — MULTIPLE VITAMINS W/ MINERALS TAB 1 TABLET: TAB at 09:27

## 2017-10-29 LAB
ALBUMIN SERPL-MCNC: 2.6 G/DL (ref 3.5–5.2)
ALBUMIN/GLOB SERPL: 0.7 G/DL
ALP SERPL-CCNC: 87 U/L (ref 39–117)
ALT SERPL W P-5'-P-CCNC: 10 U/L (ref 1–33)
ANION GAP SERPL CALCULATED.3IONS-SCNC: 12 MMOL/L
APTT PPP: 43.7 SECONDS (ref 22.7–35.4)
AST SERPL-CCNC: 24 U/L (ref 1–32)
BASOPHILS # BLD AUTO: 0.03 10*3/MM3 (ref 0–0.2)
BASOPHILS NFR BLD AUTO: 0.2 % (ref 0–1.5)
BILIRUB SERPL-MCNC: 1.8 MG/DL (ref 0.1–1.2)
BUN BLD-MCNC: 22 MG/DL (ref 8–23)
BUN/CREAT SERPL: 18.8 (ref 7–25)
CALCIUM SPEC-SCNC: 9.8 MG/DL (ref 8.6–10.5)
CHLORIDE SERPL-SCNC: 106 MMOL/L (ref 98–107)
CO2 SERPL-SCNC: 25 MMOL/L (ref 22–29)
CREAT BLD-MCNC: 1.17 MG/DL (ref 0.57–1)
DEPRECATED RDW RBC AUTO: 52.9 FL (ref 37–54)
EOSINOPHIL # BLD AUTO: 0.21 10*3/MM3 (ref 0–0.7)
EOSINOPHIL NFR BLD AUTO: 1.6 % (ref 0.3–6.2)
ERYTHROCYTE [DISTWIDTH] IN BLOOD BY AUTOMATED COUNT: 13.9 % (ref 11.7–13)
GFR SERPL CREATININE-BSD FRML MDRD: 44 ML/MIN/1.73
GLOBULIN UR ELPH-MCNC: 3.8 GM/DL
GLUCOSE BLD-MCNC: 109 MG/DL (ref 65–99)
GLUCOSE BLDC GLUCOMTR-MCNC: 125 MG/DL (ref 70–130)
GLUCOSE BLDC GLUCOMTR-MCNC: 136 MG/DL (ref 70–130)
GLUCOSE BLDC GLUCOMTR-MCNC: 99 MG/DL (ref 70–130)
HCT VFR BLD AUTO: 31.3 % (ref 35.6–45.5)
HGB BLD-MCNC: 10.4 G/DL (ref 11.9–15.5)
IMM GRANULOCYTES # BLD: 0.07 10*3/MM3 (ref 0–0.03)
IMM GRANULOCYTES NFR BLD: 0.5 % (ref 0–0.5)
LYMPHOCYTES # BLD AUTO: 3.5 10*3/MM3 (ref 0.9–4.8)
LYMPHOCYTES NFR BLD AUTO: 26.7 % (ref 19.6–45.3)
MCH RBC QN AUTO: 34.3 PG (ref 26.9–32)
MCHC RBC AUTO-ENTMCNC: 33.2 G/DL (ref 32.4–36.3)
MCV RBC AUTO: 103.3 FL (ref 80.5–98.2)
MONOCYTES # BLD AUTO: 2.22 10*3/MM3 (ref 0.2–1.2)
MONOCYTES NFR BLD AUTO: 16.9 % (ref 5–12)
NEUTROPHILS # BLD AUTO: 7.09 10*3/MM3 (ref 1.9–8.1)
NEUTROPHILS NFR BLD AUTO: 54.1 % (ref 42.7–76)
PLATELET # BLD AUTO: 107 10*3/MM3 (ref 140–500)
PMV BLD AUTO: 12.6 FL (ref 6–12)
POTASSIUM BLD-SCNC: 3.4 MMOL/L (ref 3.5–5.2)
PROCALCITONIN SERPL-MCNC: 0.1 NG/ML (ref 0.1–0.25)
PROT SERPL-MCNC: 6.4 G/DL (ref 6–8.5)
RBC # BLD AUTO: 3.03 10*6/MM3 (ref 3.9–5.2)
SODIUM BLD-SCNC: 143 MMOL/L (ref 136–145)
WBC NRBC COR # BLD: 13.12 10*3/MM3 (ref 4.5–10.7)

## 2017-10-29 PROCEDURE — 80053 COMPREHEN METABOLIC PANEL: CPT | Performed by: HOSPITALIST

## 2017-10-29 PROCEDURE — 82105 ALPHA-FETOPROTEIN SERUM: CPT | Performed by: INTERNAL MEDICINE

## 2017-10-29 PROCEDURE — 25010000002 ERTAPENEM PER 500 MG: Performed by: HOSPITALIST

## 2017-10-29 PROCEDURE — 85025 COMPLETE CBC W/AUTO DIFF WBC: CPT | Performed by: HOSPITALIST

## 2017-10-29 PROCEDURE — 82962 GLUCOSE BLOOD TEST: CPT

## 2017-10-29 PROCEDURE — 84145 PROCALCITONIN (PCT): CPT | Performed by: HOSPITALIST

## 2017-10-29 PROCEDURE — 99232 SBSQ HOSP IP/OBS MODERATE 35: CPT | Performed by: INTERNAL MEDICINE

## 2017-10-29 PROCEDURE — 85730 THROMBOPLASTIN TIME PARTIAL: CPT | Performed by: INTERNAL MEDICINE

## 2017-10-29 RX ORDER — POTASSIUM CHLORIDE 1.5 G/1.77G
40 POWDER, FOR SOLUTION ORAL ONCE
Status: COMPLETED | OUTPATIENT
Start: 2017-10-29 | End: 2017-10-29

## 2017-10-29 RX ADMIN — APIXABAN 5 MG: 2.5 TABLET, FILM COATED ORAL at 17:30

## 2017-10-29 RX ADMIN — PANTOPRAZOLE SODIUM 40 MG: 40 TABLET, DELAYED RELEASE ORAL at 06:31

## 2017-10-29 RX ADMIN — METOPROLOL SUCCINATE 12.5 MG: 25 TABLET, FILM COATED, EXTENDED RELEASE ORAL at 09:06

## 2017-10-29 RX ADMIN — POTASSIUM CHLORIDE 40 MEQ: 1.5 POWDER, FOR SOLUTION ORAL at 12:59

## 2017-10-29 RX ADMIN — LACTULOSE 30 G: 10 SOLUTION ORAL at 22:22

## 2017-10-29 RX ADMIN — RIFAXIMIN 550 MG: 550 TABLET ORAL at 22:22

## 2017-10-29 RX ADMIN — WATER 1 G: 1 INJECTION INTRAMUSCULAR; INTRAVENOUS; SUBCUTANEOUS at 17:35

## 2017-10-29 RX ADMIN — LATANOPROST 1 DROP: 50 SOLUTION OPHTHALMIC at 22:28

## 2017-10-29 RX ADMIN — Medication 10 ML: at 22:22

## 2017-10-29 RX ADMIN — LACTULOSE 30 G: 10 SOLUTION ORAL at 09:06

## 2017-10-29 RX ADMIN — RIFAXIMIN 550 MG: 550 TABLET ORAL at 09:06

## 2017-10-29 RX ADMIN — METOPROLOL SUCCINATE 12.5 MG: 25 TABLET, FILM COATED, EXTENDED RELEASE ORAL at 22:22

## 2017-10-29 RX ADMIN — MULTIPLE VITAMINS W/ MINERALS TAB 1 TABLET: TAB at 09:07

## 2017-10-29 RX ADMIN — APIXABAN 5 MG: 2.5 TABLET, FILM COATED ORAL at 09:06

## 2017-10-30 LAB
AFP-TM SERPL-MCNC: 5.9 NG/ML (ref 0–8.3)
ALBUMIN SERPL-MCNC: 2.9 G/DL (ref 3.5–5.2)
ALBUMIN/GLOB SERPL: 0.7 G/DL
ALP SERPL-CCNC: 113 U/L (ref 39–117)
ALT SERPL W P-5'-P-CCNC: 12 U/L (ref 1–33)
AMMONIA BLD-SCNC: 44 UMOL/L (ref 11–51)
ANION GAP SERPL CALCULATED.3IONS-SCNC: 10.4 MMOL/L
AST SERPL-CCNC: 24 U/L (ref 1–32)
BASOPHILS # BLD AUTO: 0.04 10*3/MM3 (ref 0–0.2)
BASOPHILS NFR BLD AUTO: 0.4 % (ref 0–1.5)
BILIRUB SERPL-MCNC: 1.4 MG/DL (ref 0.1–1.2)
BUN BLD-MCNC: 24 MG/DL (ref 8–23)
BUN/CREAT SERPL: 21.2 (ref 7–25)
CALCIUM SPEC-SCNC: 9.6 MG/DL (ref 8.6–10.5)
CHLORIDE SERPL-SCNC: 100 MMOL/L (ref 98–107)
CO2 SERPL-SCNC: 28.6 MMOL/L (ref 22–29)
CREAT BLD-MCNC: 1.13 MG/DL (ref 0.57–1)
DEPRECATED RDW RBC AUTO: 52.5 FL (ref 37–54)
EOSINOPHIL # BLD AUTO: 0.49 10*3/MM3 (ref 0–0.7)
EOSINOPHIL NFR BLD AUTO: 4.8 % (ref 0.3–6.2)
ERYTHROCYTE [DISTWIDTH] IN BLOOD BY AUTOMATED COUNT: 13.8 % (ref 11.7–13)
GFR SERPL CREATININE-BSD FRML MDRD: 46 ML/MIN/1.73
GLOBULIN UR ELPH-MCNC: 4 GM/DL
GLUCOSE BLD-MCNC: 141 MG/DL (ref 65–99)
GLUCOSE BLDC GLUCOMTR-MCNC: 139 MG/DL (ref 70–130)
HCT VFR BLD AUTO: 34.4 % (ref 35.6–45.5)
HGB BLD-MCNC: 11.2 G/DL (ref 11.9–15.5)
IMM GRANULOCYTES # BLD: 0.07 10*3/MM3 (ref 0–0.03)
IMM GRANULOCYTES NFR BLD: 0.7 % (ref 0–0.5)
LYMPHOCYTES # BLD AUTO: 2.48 10*3/MM3 (ref 0.9–4.8)
LYMPHOCYTES NFR BLD AUTO: 24.3 % (ref 19.6–45.3)
MCH RBC QN AUTO: 34.1 PG (ref 26.9–32)
MCHC RBC AUTO-ENTMCNC: 32.6 G/DL (ref 32.4–36.3)
MCV RBC AUTO: 104.9 FL (ref 80.5–98.2)
MONOCYTES # BLD AUTO: 1.38 10*3/MM3 (ref 0.2–1.2)
MONOCYTES NFR BLD AUTO: 13.5 % (ref 5–12)
NEUTROPHILS # BLD AUTO: 5.75 10*3/MM3 (ref 1.9–8.1)
NEUTROPHILS NFR BLD AUTO: 56.3 % (ref 42.7–76)
PLATELET # BLD AUTO: 110 10*3/MM3 (ref 140–500)
PMV BLD AUTO: 12.5 FL (ref 6–12)
POTASSIUM BLD-SCNC: 3.6 MMOL/L (ref 3.5–5.2)
PROCALCITONIN SERPL-MCNC: 0.11 NG/ML (ref 0.1–0.25)
PROT SERPL-MCNC: 6.9 G/DL (ref 6–8.5)
RBC # BLD AUTO: 3.28 10*6/MM3 (ref 3.9–5.2)
SODIUM BLD-SCNC: 139 MMOL/L (ref 136–145)
WBC NRBC COR # BLD: 10.21 10*3/MM3 (ref 4.5–10.7)

## 2017-10-30 PROCEDURE — 80053 COMPREHEN METABOLIC PANEL: CPT | Performed by: HOSPITALIST

## 2017-10-30 PROCEDURE — 25010000002 ERTAPENEM PER 500 MG: Performed by: HOSPITALIST

## 2017-10-30 PROCEDURE — 97110 THERAPEUTIC EXERCISES: CPT

## 2017-10-30 PROCEDURE — 82962 GLUCOSE BLOOD TEST: CPT

## 2017-10-30 PROCEDURE — 99231 SBSQ HOSP IP/OBS SF/LOW 25: CPT | Performed by: INTERNAL MEDICINE

## 2017-10-30 PROCEDURE — 85025 COMPLETE CBC W/AUTO DIFF WBC: CPT | Performed by: HOSPITALIST

## 2017-10-30 PROCEDURE — 82140 ASSAY OF AMMONIA: CPT | Performed by: HOSPITALIST

## 2017-10-30 PROCEDURE — 97162 PT EVAL MOD COMPLEX 30 MIN: CPT

## 2017-10-30 PROCEDURE — 84145 PROCALCITONIN (PCT): CPT | Performed by: HOSPITALIST

## 2017-10-30 RX ADMIN — WATER 1 G: 1 INJECTION INTRAMUSCULAR; INTRAVENOUS; SUBCUTANEOUS at 18:35

## 2017-10-30 RX ADMIN — LACTULOSE 30 G: 10 SOLUTION ORAL at 09:07

## 2017-10-30 RX ADMIN — RIFAXIMIN 550 MG: 550 TABLET ORAL at 20:56

## 2017-10-30 RX ADMIN — APIXABAN 5 MG: 2.5 TABLET, FILM COATED ORAL at 18:33

## 2017-10-30 RX ADMIN — LACTULOSE 30 G: 10 SOLUTION ORAL at 16:15

## 2017-10-30 RX ADMIN — METOPROLOL SUCCINATE 12.5 MG: 25 TABLET, FILM COATED, EXTENDED RELEASE ORAL at 09:09

## 2017-10-30 RX ADMIN — RIFAXIMIN 550 MG: 550 TABLET ORAL at 09:09

## 2017-10-30 RX ADMIN — METOPROLOL SUCCINATE 12.5 MG: 25 TABLET, FILM COATED, EXTENDED RELEASE ORAL at 20:56

## 2017-10-30 RX ADMIN — PANTOPRAZOLE SODIUM 40 MG: 40 TABLET, DELAYED RELEASE ORAL at 06:36

## 2017-10-30 RX ADMIN — LACTULOSE 30 G: 10 SOLUTION ORAL at 20:56

## 2017-10-30 RX ADMIN — LATANOPROST 1 DROP: 50 SOLUTION OPHTHALMIC at 20:56

## 2017-10-30 RX ADMIN — APIXABAN 5 MG: 2.5 TABLET, FILM COATED ORAL at 09:09

## 2017-10-30 RX ADMIN — MULTIPLE VITAMINS W/ MINERALS TAB 1 TABLET: TAB at 09:09

## 2017-10-31 LAB
ALBUMIN SERPL-MCNC: 2.7 G/DL (ref 3.5–5.2)
ALBUMIN/GLOB SERPL: 0.8 G/DL
ALP SERPL-CCNC: 154 U/L (ref 39–117)
ALT SERPL W P-5'-P-CCNC: 12 U/L (ref 1–33)
ANION GAP SERPL CALCULATED.3IONS-SCNC: 10.4 MMOL/L
AST SERPL-CCNC: 25 U/L (ref 1–32)
BASOPHILS # BLD AUTO: 0.04 10*3/MM3 (ref 0–0.2)
BASOPHILS NFR BLD AUTO: 0.4 % (ref 0–1.5)
BILIRUB SERPL-MCNC: 0.8 MG/DL (ref 0.1–1.2)
BUN BLD-MCNC: 23 MG/DL (ref 8–23)
BUN/CREAT SERPL: 25.6 (ref 7–25)
CALCIUM SPEC-SCNC: 8.9 MG/DL (ref 8.6–10.5)
CHLORIDE SERPL-SCNC: 99 MMOL/L (ref 98–107)
CO2 SERPL-SCNC: 26.6 MMOL/L (ref 22–29)
CREAT BLD-MCNC: 0.9 MG/DL (ref 0.57–1)
DEPRECATED RDW RBC AUTO: 51.1 FL (ref 37–54)
EOSINOPHIL # BLD AUTO: 0.57 10*3/MM3 (ref 0–0.7)
EOSINOPHIL NFR BLD AUTO: 5.2 % (ref 0.3–6.2)
ERYTHROCYTE [DISTWIDTH] IN BLOOD BY AUTOMATED COUNT: 13.6 % (ref 11.7–13)
GFR SERPL CREATININE-BSD FRML MDRD: 60 ML/MIN/1.73
GLOBULIN UR ELPH-MCNC: 3.6 GM/DL
GLUCOSE BLD-MCNC: 100 MG/DL (ref 65–99)
GLUCOSE BLDC GLUCOMTR-MCNC: 123 MG/DL (ref 70–130)
HCT VFR BLD AUTO: 32.3 % (ref 35.6–45.5)
HGB BLD-MCNC: 10.6 G/DL (ref 11.9–15.5)
IMM GRANULOCYTES # BLD: 0.09 10*3/MM3 (ref 0–0.03)
IMM GRANULOCYTES NFR BLD: 0.8 % (ref 0–0.5)
LYMPHOCYTES # BLD AUTO: 2.82 10*3/MM3 (ref 0.9–4.8)
LYMPHOCYTES NFR BLD AUTO: 25.6 % (ref 19.6–45.3)
MCH RBC QN AUTO: 34.1 PG (ref 26.9–32)
MCHC RBC AUTO-ENTMCNC: 32.8 G/DL (ref 32.4–36.3)
MCV RBC AUTO: 103.9 FL (ref 80.5–98.2)
MONOCYTES # BLD AUTO: 1.66 10*3/MM3 (ref 0.2–1.2)
MONOCYTES NFR BLD AUTO: 15.1 % (ref 5–12)
NEUTROPHILS # BLD AUTO: 5.82 10*3/MM3 (ref 1.9–8.1)
NEUTROPHILS NFR BLD AUTO: 52.9 % (ref 42.7–76)
NRBC BLD MANUAL-RTO: 0 /100 WBC (ref 0–0)
PLATELET # BLD AUTO: 132 10*3/MM3 (ref 140–500)
PMV BLD AUTO: 12.3 FL (ref 6–12)
POTASSIUM BLD-SCNC: 3.4 MMOL/L (ref 3.5–5.2)
POTASSIUM BLD-SCNC: 3.7 MMOL/L (ref 3.5–5.2)
PROT SERPL-MCNC: 6.3 G/DL (ref 6–8.5)
RBC # BLD AUTO: 3.11 10*6/MM3 (ref 3.9–5.2)
SODIUM BLD-SCNC: 136 MMOL/L (ref 136–145)
WBC NRBC COR # BLD: 11 10*3/MM3 (ref 4.5–10.7)

## 2017-10-31 PROCEDURE — 85025 COMPLETE CBC W/AUTO DIFF WBC: CPT | Performed by: HOSPITALIST

## 2017-10-31 PROCEDURE — 82962 GLUCOSE BLOOD TEST: CPT

## 2017-10-31 PROCEDURE — 80053 COMPREHEN METABOLIC PANEL: CPT | Performed by: HOSPITALIST

## 2017-10-31 PROCEDURE — 84132 ASSAY OF SERUM POTASSIUM: CPT | Performed by: HOSPITALIST

## 2017-10-31 PROCEDURE — 25010000002 ERTAPENEM PER 500 MG: Performed by: HOSPITALIST

## 2017-10-31 RX ORDER — POTASSIUM CHLORIDE 1.5 G/1.77G
40 POWDER, FOR SOLUTION ORAL ONCE
Status: DISCONTINUED | OUTPATIENT
Start: 2017-10-31 | End: 2017-10-31

## 2017-10-31 RX ORDER — POTASSIUM CHLORIDE 750 MG/1
40 CAPSULE, EXTENDED RELEASE ORAL ONCE
Status: COMPLETED | OUTPATIENT
Start: 2017-10-31 | End: 2017-10-31

## 2017-10-31 RX ADMIN — METOPROLOL SUCCINATE 12.5 MG: 25 TABLET, FILM COATED, EXTENDED RELEASE ORAL at 08:58

## 2017-10-31 RX ADMIN — PANTOPRAZOLE SODIUM 40 MG: 40 TABLET, DELAYED RELEASE ORAL at 06:29

## 2017-10-31 RX ADMIN — WATER 1 G: 1 INJECTION INTRAMUSCULAR; INTRAVENOUS; SUBCUTANEOUS at 15:33

## 2017-10-31 RX ADMIN — APIXABAN 5 MG: 2.5 TABLET, FILM COATED ORAL at 17:54

## 2017-10-31 RX ADMIN — APIXABAN 5 MG: 2.5 TABLET, FILM COATED ORAL at 08:57

## 2017-10-31 RX ADMIN — LACTULOSE 30 G: 10 SOLUTION ORAL at 08:59

## 2017-10-31 RX ADMIN — POTASSIUM CHLORIDE 40 MEQ: 750 CAPSULE, EXTENDED RELEASE ORAL at 08:58

## 2017-10-31 RX ADMIN — METOPROLOL SUCCINATE 12.5 MG: 25 TABLET, FILM COATED, EXTENDED RELEASE ORAL at 21:20

## 2017-10-31 RX ADMIN — LACTULOSE 30 G: 10 SOLUTION ORAL at 15:33

## 2017-10-31 RX ADMIN — LACTULOSE 30 G: 10 SOLUTION ORAL at 21:20

## 2017-10-31 RX ADMIN — RIFAXIMIN 550 MG: 550 TABLET ORAL at 08:58

## 2017-10-31 RX ADMIN — MULTIPLE VITAMINS W/ MINERALS TAB 1 TABLET: TAB at 08:58

## 2017-10-31 RX ADMIN — RIFAXIMIN 550 MG: 550 TABLET ORAL at 21:20

## 2017-10-31 RX ADMIN — LATANOPROST 1 DROP: 50 SOLUTION OPHTHALMIC at 21:21

## 2017-11-01 ENCOUNTER — APPOINTMENT (OUTPATIENT)
Dept: GENERAL RADIOLOGY | Facility: HOSPITAL | Age: 82
End: 2017-11-01
Attending: INTERNAL MEDICINE

## 2017-11-01 LAB
ALBUMIN SERPL-MCNC: 2.5 G/DL (ref 3.5–5.2)
ALBUMIN/GLOB SERPL: 0.7 G/DL
ALP SERPL-CCNC: 124 U/L (ref 39–117)
ALT SERPL W P-5'-P-CCNC: 14 U/L (ref 1–33)
ANION GAP SERPL CALCULATED.3IONS-SCNC: 12.8 MMOL/L
AST SERPL-CCNC: 29 U/L (ref 1–32)
BASOPHILS # BLD AUTO: 0.06 10*3/MM3 (ref 0–0.2)
BASOPHILS NFR BLD AUTO: 0.5 % (ref 0–1.5)
BILIRUB SERPL-MCNC: 1 MG/DL (ref 0.1–1.2)
BUN BLD-MCNC: 22 MG/DL (ref 8–23)
BUN/CREAT SERPL: 23.9 (ref 7–25)
CALCIUM SPEC-SCNC: 9.1 MG/DL (ref 8.6–10.5)
CHLORIDE SERPL-SCNC: 97 MMOL/L (ref 98–107)
CO2 SERPL-SCNC: 25.2 MMOL/L (ref 22–29)
CREAT BLD-MCNC: 0.92 MG/DL (ref 0.57–1)
DEPRECATED RDW RBC AUTO: 51.8 FL (ref 37–54)
EOSINOPHIL # BLD AUTO: 0.59 10*3/MM3 (ref 0–0.7)
EOSINOPHIL NFR BLD AUTO: 4.5 % (ref 0.3–6.2)
ERYTHROCYTE [DISTWIDTH] IN BLOOD BY AUTOMATED COUNT: 13.6 % (ref 11.7–13)
GFR SERPL CREATININE-BSD FRML MDRD: 59 ML/MIN/1.73
GLOBULIN UR ELPH-MCNC: 3.8 GM/DL
GLUCOSE BLD-MCNC: 91 MG/DL (ref 65–99)
HCT VFR BLD AUTO: 32.2 % (ref 35.6–45.5)
HGB BLD-MCNC: 10.5 G/DL (ref 11.9–15.5)
IMM GRANULOCYTES # BLD: 0.08 10*3/MM3 (ref 0–0.03)
IMM GRANULOCYTES NFR BLD: 0.6 % (ref 0–0.5)
LYMPHOCYTES # BLD AUTO: 2.99 10*3/MM3 (ref 0.9–4.8)
LYMPHOCYTES NFR BLD AUTO: 22.9 % (ref 19.6–45.3)
MACROCYTES BLD QL SMEAR: NORMAL
MCH RBC QN AUTO: 34.4 PG (ref 26.9–32)
MCHC RBC AUTO-ENTMCNC: 32.6 G/DL (ref 32.4–36.3)
MCV RBC AUTO: 105.6 FL (ref 80.5–98.2)
MONOCYTES # BLD AUTO: 2.08 10*3/MM3 (ref 0.2–1.2)
MONOCYTES NFR BLD AUTO: 15.9 % (ref 5–12)
NEUTROPHILS # BLD AUTO: 7.26 10*3/MM3 (ref 1.9–8.1)
NEUTROPHILS NFR BLD AUTO: 55.6 % (ref 42.7–76)
PLAT MORPH BLD: NORMAL
PLATELET # BLD AUTO: 145 10*3/MM3 (ref 140–500)
PMV BLD AUTO: 11.8 FL (ref 6–12)
POTASSIUM BLD-SCNC: 3.9 MMOL/L (ref 3.5–5.2)
PROCALCITONIN SERPL-MCNC: 0.1 NG/ML (ref 0.1–0.25)
PROT SERPL-MCNC: 6.3 G/DL (ref 6–8.5)
RBC # BLD AUTO: 3.05 10*6/MM3 (ref 3.9–5.2)
SODIUM BLD-SCNC: 135 MMOL/L (ref 136–145)
WBC MORPH BLD: NORMAL
WBC NRBC COR # BLD: 13.06 10*3/MM3 (ref 4.5–10.7)

## 2017-11-01 PROCEDURE — 85007 BL SMEAR W/DIFF WBC COUNT: CPT | Performed by: HOSPITALIST

## 2017-11-01 PROCEDURE — 97110 THERAPEUTIC EXERCISES: CPT

## 2017-11-01 PROCEDURE — 25010000002 ERTAPENEM PER 500 MG: Performed by: HOSPITALIST

## 2017-11-01 PROCEDURE — 85025 COMPLETE CBC W/AUTO DIFF WBC: CPT | Performed by: HOSPITALIST

## 2017-11-01 PROCEDURE — 84145 PROCALCITONIN (PCT): CPT | Performed by: HOSPITALIST

## 2017-11-01 PROCEDURE — 71020 HC CHEST PA AND LATERAL: CPT

## 2017-11-01 PROCEDURE — 80053 COMPREHEN METABOLIC PANEL: CPT | Performed by: HOSPITALIST

## 2017-11-01 RX ORDER — CALCIUM ACETATE MONOHYDRATE AND ALUMINUM SULFATE TETRADECAHYDRATE 952; 1347 MG/2299MG; MG/2299MG
1 POWDER, FOR SOLUTION TOPICAL EVERY 8 HOURS SCHEDULED
Status: DISCONTINUED | OUTPATIENT
Start: 2017-11-01 | End: 2017-11-05 | Stop reason: HOSPADM

## 2017-11-01 RX ADMIN — METOPROLOL SUCCINATE 12.5 MG: 25 TABLET, FILM COATED, EXTENDED RELEASE ORAL at 09:30

## 2017-11-01 RX ADMIN — RIFAXIMIN 550 MG: 550 TABLET ORAL at 21:34

## 2017-11-01 RX ADMIN — APIXABAN 5 MG: 2.5 TABLET, FILM COATED ORAL at 09:31

## 2017-11-01 RX ADMIN — CALCIUM ACETATE MONOHYDRATE AND ALUMINUM SULFATE TETRADECAHYDRATE 1 PACKET: 952; 1347 POWDER, FOR SOLUTION TOPICAL at 21:34

## 2017-11-01 RX ADMIN — LATANOPROST 1 DROP: 50 SOLUTION OPHTHALMIC at 21:34

## 2017-11-01 RX ADMIN — RIFAXIMIN 550 MG: 550 TABLET ORAL at 09:30

## 2017-11-01 RX ADMIN — CALCIUM ACETATE MONOHYDRATE AND ALUMINUM SULFATE TETRADECAHYDRATE 1 PACKET: 952; 1347 POWDER, FOR SOLUTION TOPICAL at 16:52

## 2017-11-01 RX ADMIN — LACTULOSE 30 G: 10 SOLUTION ORAL at 09:31

## 2017-11-01 RX ADMIN — LACTULOSE 30 G: 10 SOLUTION ORAL at 16:52

## 2017-11-01 RX ADMIN — LACTULOSE 30 G: 10 SOLUTION ORAL at 21:34

## 2017-11-01 RX ADMIN — APIXABAN 5 MG: 2.5 TABLET, FILM COATED ORAL at 17:15

## 2017-11-01 RX ADMIN — METOPROLOL SUCCINATE 12.5 MG: 25 TABLET, FILM COATED, EXTENDED RELEASE ORAL at 21:35

## 2017-11-01 RX ADMIN — ACETAMINOPHEN 650 MG: 325 TABLET ORAL at 23:45

## 2017-11-01 RX ADMIN — PANTOPRAZOLE SODIUM 40 MG: 40 TABLET, DELAYED RELEASE ORAL at 06:22

## 2017-11-01 RX ADMIN — MULTIPLE VITAMINS W/ MINERALS TAB 1 TABLET: TAB at 09:30

## 2017-11-01 RX ADMIN — WATER 1 G: 1 INJECTION INTRAMUSCULAR; INTRAVENOUS; SUBCUTANEOUS at 16:54

## 2017-11-01 NOTE — PLAN OF CARE
Problem: Patient Care Overview (Adult)  Goal: Plan of Care Review    11/01/17 0857   Outcome Evaluation   Outcome Summary/Follow up Plan Pt requires 2 assist for mobility due to decreased motor planning, decreased sequencing, decreased balance, and confusion. Pt is a high fall risk. She has minimal insight to deficits. Will continue to see to improve mobility.

## 2017-11-01 NOTE — PROGRESS NOTES
"  Infectious Diseases Progress Note    Andrew Sandhu MD     [facility]  Los: 6 days  Patient Identification:  Name: Dorothy A Ochsner  Age: 81 y.o.  Sex: female  :  1935  MRN: [id number]         Primary Care Physician: Charly Ryder MD            Subjective: Feels about the same denies any cough or congestion.   Interval History: See consultation note    Objective:    Scheduled Meds:    apixaban 5 mg Oral BID   ertapenem 1 g Intravenous Q24H   lactulose 30 g Oral TID   latanoprost 1 drop Both Eyes Nightly   metoprolol succinate XL 12.5 mg Oral Q12H   multivitamin with minerals 1 tablet Oral Daily   pantoprazole 40 mg Oral QAM   rifAXIMin 550 mg Oral Q12H     Continuous Infusions:     Vital signs in last 24 hours:  Temp:  [98 °F (36.7 °C)-98.4 °F (36.9 °C)] 98.4 °F (36.9 °C)  Heart Rate:  [60-75] 62  Resp:  [16-18] 16  BP: (115-146)/(46-69) 146/51    Intake/Output:    Intake/Output Summary (Last 24 hours) at 17 1408  Last data filed at 17 1321   Gross per 24 hour   Intake              770 ml   Output                0 ml   Net              770 ml       Exam:  /51 (BP Location: Left arm, Patient Position: Lying)  Pulse 62  Temp 98.4 °F (36.9 °C) (Oral)   Resp 16  Ht 62\" (157.5 cm)  Wt 196 lb 1.6 oz (89 kg)  SpO2 98%  BMI 35.87 kg/m2    General Appearance:    Awake and interactive chronically ill but does not appear toxic                          Head:    Normocephalic, without obvious abnormality, atraumatic                           Eyes:    PERRL, conjunctiva/corneas clear, EOM's intact, both eyes                         Throat:   Lips, tongue, gums normal; oral mucosa pink and moist                           Neck:   Supple, symmetrical, trachea midline, no JVD                         Lungs:    Clear to auscultation bilaterally, respirations unlabored                 Chest Wall:    No tenderness or deformity                          Heart:    Regular rate and rhythm, S1 " and S2 normal, no murmur,no  Rub                                      or gallop                  Abdomen:     Soft, non-tender, bowel sounds active, no masses, no                                                        organomegaly                  Extremities:   Extremities normal, atraumatic, no cyanosis or edema                        Pulses:   Pulses palpable in all extremities                            Skin:   Skin is warm and dry,  no rashes or palpable lesions              Data Review:    I reviewed the patient's new clinical results.    Results from last 7 days  Lab Units 11/01/17  0425 10/31/17  0413 10/30/17  0615 10/29/17  0450 10/28/17  0441 10/26/17  1034   WBC 10*3/mm3 13.06* 11.00* 10.21 13.12* 13.97* 8.25   HEMOGLOBIN g/dL 10.5* 10.6* 11.2* 10.4* 11.1* 13.2   PLATELETS 10*3/mm3 145 132* 110* 107* 156 161       Results from last 7 days  Lab Units 11/01/17  0425 10/31/17  1200 10/31/17  0413 10/30/17  0615 10/29/17  0450 10/28/17  0441 10/26/17  1143   SODIUM mmol/L 135*  --  136 139 143 145 142   POTASSIUM mmol/L 3.9 3.7 3.4* 3.6 3.4* 3.3* 3.9   CHLORIDE mmol/L 97*  --  99 100 106 106 103   CO2 mmol/L 25.2  --  26.6 28.6 25.0 23.3 26.7   BUN mg/dL 22  --  23 24* 22 21 16   CREATININE mg/dL 0.92  --  0.90 1.13* 1.17* 1.18* 1.04*   CALCIUM mg/dL 9.1  --  8.9 9.6 9.8 10.0 10.1   GLUCOSE mg/dL 91  --  100* 141* 109* 139* 103*       Microbiology Results (last 10 days)     Procedure Component Value - Date/Time    Urine Culture - Urine, Urine, Clean Catch [[id number]]  (Abnormal)  (Susceptibility) Collected:  10/26/17 1225    Lab Status:  Final result Specimen:  Urine from Urine, Clean Catch Updated:  10/28/17 0901     Urine Culture --      >100,000 CFU/mL Escherichia coli (A)    Susceptibility      Escherichia coli     [place]     Amikacin <=2 ug/ml Susceptible     Ampicillin >=32 ug/ml Resistant     Ampicillin + Sulbactam >=32 ug/ml Resistant     Cefazolin >=64 ug/ml Resistant     Cefepime 2 ug/ml Susceptible      Ceftriaxone >=64 ug/ml Resistant     Ciprofloxacin >=4 ug/ml Resistant     Ertapenem <=0.5 ug/ml Susceptible     Gentamicin >=16 ug/ml Resistant     Levofloxacin >=8 ug/ml Resistant     Nitrofurantoin <=16 ug/ml Susceptible     Piperacillin + Tazobactam <=4 ug/ml Susceptible     Tetracycline >=16 ug/ml Resistant     Trimethoprim + Sulfamethoxazole >=320 ug/ml Resistant                          Assessment:  Principal Problem:    Hepatic encephalopathy  Active Problems:    Cirrhosis of liver    Atrial fibrillation    UTI (urinary tract infection)    Hypertension    DNR (do not resuscitate)      Plan:  Continue present treatment. Check chest x-ray to explain this unexpected rising to be succumbed to make sure that she didn't aspirate.  Discussed with Dr. Martinez.    Andrew Sandhu MD  11/1/2017  2:08 PM    Much of this encounter note is an electronic transcription/translation of spoken language to printed text. The electronic translation of spoken language may permit erroneous, or at times, nonsensical words or phrases to be inadvertently transcribed; Although I have reviewed the note for such errors, some may still exist

## 2017-11-01 NOTE — PLAN OF CARE
Problem: Patient Care Overview (Adult)  Goal: Plan of Care Review  Outcome: Ongoing (interventions implemented as appropriate)    11/01/17 0650   Coping/Psychosocial Response Interventions   Plan Of Care Reviewed With patient   Patient Care Overview   Progress improving   Outcome Evaluation   Outcome Summary/Follow up Plan VSS. RESTED WELL OVERNIGHT BETWEEN CARE. SAFETY MAINTAINED.         Problem: Confusion, Acute (Adult)  Goal: Cognitive/Functional Impairments Minimized  Outcome: Ongoing (interventions implemented as appropriate)    Problem: Fall Risk (Adult)  Goal: Absence of Falls  Outcome: Ongoing (interventions implemented as appropriate)    Problem: Pressure Ulcer Risk (Marco Antonio Scale) (Adult,Obstetrics,Pediatric)  Goal: Skin Integrity  Outcome: Ongoing (interventions implemented as appropriate)    Problem: Liver Failure, Acute/Chronic (Adult)  Goal: Signs and Symptoms of Listed Potential Problems Will be Absent or Manageable (Liver Failure, Acute/Chronic)  Outcome: Ongoing (interventions implemented as appropriate)    Problem: Infection, Risk/Actual (Adult)  Goal: Infection Prevention/Resolution  Outcome: Ongoing (interventions implemented as appropriate)

## 2017-11-01 NOTE — THERAPY TREATMENT NOTE
Acute Care - Physical Therapy Treatment Note  [facility]     Patient Name: Dorothy A Ochsner  : 1935  MRN: [id number]  Today's Date: 2017  Onset of Illness/Injury or Date of Surgery Date: 10/26/17     Referring Physician: Michelle    Admit Date: 10/26/2017    Visit Dx:    ICD-10-CM ICD-9-CM   1. Hepatic encephalopathy K72.90 572.2   2. Generalized weakness R53.1 780.79     Patient Active Problem List   Diagnosis   • Hepatic encephalopathy   • Cirrhosis of liver   • Atrial fibrillation   • UTI (urinary tract infection)   • Hypertension   • DNR (do not resuscitate)               Adult Rehabilitation Note       17 0853          Rehab Assessment/Intervention    Discipline physical therapist  -MG      Document Type therapy note (daily note)  -MG      Subjective Information agree to therapy;complains of;weakness  -MG      Patient Effort, Rehab Treatment good  -MG      Symptoms Noted During/After Treatment fatigue  -MG      Precautions/Limitations fall precautions  -MG      Recorded by [MG] Megan Gosselin, PT      Pain Assessment    Pain Assessment No/denies pain  -MG      Recorded by [MG] Megan Gosselin, PT      Cognitive Assessment/Intervention    Current Cognitive/Communication Assessment impaired  -MG      Orientation Status oriented to;person  -MG      Follows Commands/Answers Questions 75% of the time;able to follow single-step instructions;needs cueing;needs increased time  -MG      Personal Safety mild impairment  -MG      Personal Safety Interventions fall prevention program maintained;gait belt  -MG      Recorded by [MG] Megan Gosselin, PT      Bed Mobility, Assessment/Treatment    Bed Mobility, Assistive Device bed rails;head of bed elevated;draw sheet  -MG      Bed Mob, Supine to Sit, [place] moderate assist (50% patient effort)  -MG      Bed Mob, Sit to Supine, [place] not tested  -MG      Bed Mobility, Safety Issues cognitive deficits limit understanding;decreased use of arms  for pushing/pulling;decreased use of legs for bridging/pushing  -MG      Bed Mobility, Impairments sensation decreased;strength decreased  -MG      Recorded by [MG] Megan Gosselin, PT      Transfer Assessment/Treatment    Transfers, Bed-Chair [place] minimum assist (75% patient effort);2 person assist required  -MG      Transfers, Bed-Chair-Bed, Assist Device rolling walker  -MG      Transfers, Sit-Stand [place] minimum assist (75% patient effort);2 person assist required  -MG      Transfers, Stand-Sit [place] minimum assist (75% patient effort);2 person assist required  -MG      Transfers, Sit-Stand-Sit, Assist Device rolling walker  -MG      Transfer, Safety Issues weight-shifting ability decreased;loses balance backward;step length decreased  -MG      Transfer, Impairments strength decreased;impaired balance  -MG      Transfer, Comment assist for weight shifting, verbal cues for which foot to step with   -MG      Recorded by [MG] Megan Gosselin, PT      Gait Assessment/Treatment    Gait, [place] Level minimum assist (75% patient effort);2 person assist required  -MG      Gait, Assistive Device rolling walker  -MG      Gait, Distance (Feet) 2   steps to chair, unable to go farther  -MG      Gait, Gait Deviations isaura decreased;forward flexed posture;step length decreased  -MG      Gait, Safety Issues step length decreased;weight-shifting ability decreased;loses balance backward;balance decreased during turns  -MG      Gait, Impairments strength decreased;impaired balance  -MG      Gait, Comment verbal cues for sequencing and for weight shifting   -MG      Recorded by [MG] Megan Gosselin, PT      Therapy Exercises    Bilateral Lower Extremities AROM:;10 reps;SLR;heel slides;hip abduction/adduction  -MG      Recorded by [MG] Megan Gosselin, PT      Positioning and Restraints    Pre-Treatment Position in bed  -MG      Post Treatment Position chair  -MG      In Chair call light within  reach;encouraged to call for assist;exit alarm on  -MG      Recorded by [MG] Megan Gosselin, PT        User Key  (r) = Recorded By, (t) = Taken By, (c) = Cosigned By    Initials Name Effective Dates    MG Megan Gosselin, PT 09/13/17 -                 IP PT Goals       10/30/17 1343          Bed Mobility PT LTG    Bed Mobility PT LTG, Date Established 10/30/17  -EE      Bed Mobility PT LTG, Time to Achieve 1 wk  -EE      Bed Mobility PT LTG, Activity Type all bed mobility  -EE      Bed Mobility PT LTG, [place] Level contact guard assist  -EE      Transfer Training PT LTG    Transfer Training PT LTG, Date Established 10/30/17  -EE      Transfer Training PT LTG, Time to Achieve 1 wk  -EE      Transfer Training PT LTG, Activity Type all transfers  -EE      Transfer Training PT LTG, [place] Level supervision required  -EE      Transfer Training PT LTG, Assist Device walker, rolling  -EE      Gait Training PT LTG    Gait Training Goal PT LTG, Date Established 10/30/17  -EE      Gait Training Goal PT LTG, Time to Achieve 1 wk  -EE      Gait Training Goal PT LTG, [place] Level contact guard assist  -EE      Gait Training Goal PT LTG, Assist Device walker, rolling  -EE      Gait Training Goal PT LTG, Distance to Achieve 50  -EE      Patient Education PT LTG    Patient Education PT LTG, Date Established 10/30/17  -EE      Patient Education PT LTG, Time to Achieve 1 wk  -EE      Patient Education PT LTG, Education Type HEP  -EE      Patient Education PT LTG, Education Understanding demonstrate adequately  -EE        User Key  (r) = Recorded By, (t) = Taken By, (c) = Cosigned By    Initials Name Provider Type    MALENA Yeager PT Physical Therapist          Physical Therapy Education     Title: PT OT SLP Therapies (Active)     Topic: Physical Therapy (Done)     Point: Mobility training (Done)    Learning Progress Summary    Learner Readiness Method Response Comment Documented by Status   Patient Acceptance E  VU,NR  MG 11/01/17 0857 Done    Acceptance E,TB NR  EE 10/30/17 1342 Active               Point: Home exercise program (Done)    Learning Progress Summary    Learner Readiness Method Response Comment Documented by Status   Patient Acceptance E VU,NR  MG 11/01/17 0857 Done               Point: Body mechanics (Done)    Learning Progress Summary    Learner Readiness Method Response Comment Documented by Status   Patient Acceptance E VU,NR  MG 11/01/17 0857 Done    Acceptance E,TB NR  EE 10/30/17 1342 Active               Point: Precautions (Done)    Learning Progress Summary    Learner Readiness Method Response Comment Documented by Status   Patient Acceptance E VU,NR  MG 11/01/17 0857 Done                      User Key     Initials Effective Dates Name Provider Type Discipline    EE 12/01/15 -  Dior Yeager, PT Physical Therapist PT    MG 09/13/17 -  Megan Gosselin, PT Physical Therapist PT                    PT Recommendation and Plan  Anticipated Discharge Disposition: skilled nursing facility, extended care facility  Planned Therapy Interventions: balance training, bed mobility training, gait training, home exercise program, patient/family education, strengthening, stretching, transfer training  PT Frequency: daily  Plan of Care Review  Outcome Summary/Follow up Plan: Pt requires 2 assist for mobility due to decreased motor planning, decreased sequencing, decreased balance, and confusion. Pt is a high fall risk. She has minimal insight to deficits. Will continue to see to improve mobility.           Outcome Measures       11/01/17 0800 10/30/17 1300       How much help from another person do you currently need...    Turning from your back to your side while in flat bed without using bedrails? 2  -MG 2  -EE     Moving from lying on back to sitting on the side of a flat bed without bedrails? 2  -MG 2  -EE     Moving to and from a bed to a chair (including a wheelchair)? 3  -MG 3  -EE     Standing up from a chair using  your arms (e.g., wheelchair, bedside chair)? 3  -MG 3  -EE     Climbing 3-5 steps with a railing? 1  -MG 1  -EE     To walk in hospital room? 2  -MG 3  -EE     AM-PAC 6 Clicks Score 13  -MG 14  -EE     Functional Assessment    Outcome Measure Options AM-PAC 6 Clicks Basic Mobility (PT)  -MG AM-PAC 6 Clicks Basic Mobility (PT)  -EE       User Key  (r) = Recorded By, (t) = Taken By, (c) = Cosigned By    Initials Name Provider Type    EE Dior Yeager, PT Physical Therapist    MG Megan Gosselin, PT Physical Therapist           Time Calculation:         PT Charges       11/01/17 0858          Time Calculation    Start Time 0835  -MG      Stop Time 0852  -MG      Time Calculation (min) 17 min  -MG      PT Received On 11/01/17  -MG      PT - Next Appointment 11/02/17  -MG        User Key  (r) = Recorded By, (t) = Taken By, (c) = Cosigned By    Initials Name Provider Type     Megan Gosselin, PT Physical Therapist          Therapy Charges for Today     Code Description Service Date Service Provider Modifiers Qty    [id number]  PT THER PROC EA 15 MIN 11/1/2017 Megan Gosselin, PT GP 1          PT G-Codes  Outcome Measure Options: AM-PAC 6 Clicks Basic Mobility (PT)    Megan Gosselin, PT  11/1/2017

## 2017-11-02 ENCOUNTER — APPOINTMENT (OUTPATIENT)
Dept: CT IMAGING | Facility: HOSPITAL | Age: 82
End: 2017-11-02
Attending: INTERNAL MEDICINE

## 2017-11-02 PROBLEM — K70.30 ALCOHOLIC CIRRHOSIS OF LIVER (HCC): Status: ACTIVE | Noted: 2017-11-02

## 2017-11-02 PROBLEM — D72.829 LEUKOCYTOSIS: Status: ACTIVE | Noted: 2017-11-02

## 2017-11-02 PROBLEM — Z79.01 LONG TERM CURRENT USE OF ANTICOAGULANT: Status: ACTIVE | Noted: 2017-11-02

## 2017-11-02 LAB
ALBUMIN SERPL-MCNC: 2.5 G/DL (ref 3.5–5.2)
ALBUMIN/GLOB SERPL: 0.6 G/DL
ALP SERPL-CCNC: 129 U/L (ref 39–117)
ALT SERPL W P-5'-P-CCNC: 14 U/L (ref 1–33)
ANION GAP SERPL CALCULATED.3IONS-SCNC: 9.9 MMOL/L
AST SERPL-CCNC: 31 U/L (ref 1–32)
B PERT DNA SPEC QL NAA+PROBE: NOT DETECTED
BASOPHILS # BLD AUTO: 0.06 10*3/MM3 (ref 0–0.2)
BASOPHILS NFR BLD AUTO: 0.4 % (ref 0–1.5)
BILIRUB SERPL-MCNC: 1 MG/DL (ref 0.1–1.2)
BUN BLD-MCNC: 23 MG/DL (ref 8–23)
BUN/CREAT SERPL: 22.1 (ref 7–25)
C PNEUM DNA NPH QL NAA+NON-PROBE: NOT DETECTED
CALCIUM SPEC-SCNC: 9.1 MG/DL (ref 8.6–10.5)
CHLORIDE SERPL-SCNC: 100 MMOL/L (ref 98–107)
CO2 SERPL-SCNC: 28.1 MMOL/L (ref 22–29)
CREAT BLD-MCNC: 1.04 MG/DL (ref 0.57–1)
DEPRECATED RDW RBC AUTO: 52.6 FL (ref 37–54)
EOSINOPHIL # BLD AUTO: 0.47 10*3/MM3 (ref 0–0.7)
EOSINOPHIL NFR BLD AUTO: 2.7 % (ref 0.3–6.2)
ERYTHROCYTE [DISTWIDTH] IN BLOOD BY AUTOMATED COUNT: 13.8 % (ref 11.7–13)
FLUAV H1 2009 PAND RNA NPH QL NAA+PROBE: NOT DETECTED
FLUAV H1 HA GENE NPH QL NAA+PROBE: NOT DETECTED
FLUAV H3 RNA NPH QL NAA+PROBE: NOT DETECTED
FLUAV SUBTYP SPEC NAA+PROBE: NOT DETECTED
FLUBV RNA ISLT QL NAA+PROBE: NOT DETECTED
GFR SERPL CREATININE-BSD FRML MDRD: 51 ML/MIN/1.73
GLOBULIN UR ELPH-MCNC: 3.9 GM/DL
GLUCOSE BLD-MCNC: 113 MG/DL (ref 65–99)
HADV DNA SPEC NAA+PROBE: NOT DETECTED
HCOV 229E RNA SPEC QL NAA+PROBE: NOT DETECTED
HCOV HKU1 RNA SPEC QL NAA+PROBE: NOT DETECTED
HCOV NL63 RNA SPEC QL NAA+PROBE: NOT DETECTED
HCOV OC43 RNA SPEC QL NAA+PROBE: NOT DETECTED
HCT VFR BLD AUTO: 31.9 % (ref 35.6–45.5)
HGB BLD-MCNC: 10.3 G/DL (ref 11.9–15.5)
HMPV RNA NPH QL NAA+NON-PROBE: NOT DETECTED
HPIV1 RNA SPEC QL NAA+PROBE: NOT DETECTED
HPIV2 RNA SPEC QL NAA+PROBE: NOT DETECTED
HPIV3 RNA NPH QL NAA+PROBE: NOT DETECTED
HPIV4 P GENE NPH QL NAA+PROBE: NOT DETECTED
IMM GRANULOCYTES # BLD: 0.13 10*3/MM3 (ref 0–0.03)
IMM GRANULOCYTES NFR BLD: 0.8 % (ref 0–0.5)
LYMPHOCYTES # BLD AUTO: 3.22 10*3/MM3 (ref 0.9–4.8)
LYMPHOCYTES NFR BLD AUTO: 18.8 % (ref 19.6–45.3)
M PNEUMO IGG SER IA-ACNC: NOT DETECTED
MCH RBC QN AUTO: 34 PG (ref 26.9–32)
MCHC RBC AUTO-ENTMCNC: 32.3 G/DL (ref 32.4–36.3)
MCV RBC AUTO: 105.3 FL (ref 80.5–98.2)
MONOCYTES # BLD AUTO: 2.59 10*3/MM3 (ref 0.2–1.2)
MONOCYTES NFR BLD AUTO: 15.1 % (ref 5–12)
NEUTROPHILS # BLD AUTO: 10.67 10*3/MM3 (ref 1.9–8.1)
NEUTROPHILS NFR BLD AUTO: 62.2 % (ref 42.7–76)
NRBC BLD MANUAL-RTO: 0 /100 WBC (ref 0–0)
PLATELET # BLD AUTO: 150 10*3/MM3 (ref 140–500)
PMV BLD AUTO: 11.6 FL (ref 6–12)
POTASSIUM BLD-SCNC: 3.8 MMOL/L (ref 3.5–5.2)
PROT SERPL-MCNC: 6.4 G/DL (ref 6–8.5)
RBC # BLD AUTO: 3.03 10*6/MM3 (ref 3.9–5.2)
RHINOVIRUS RNA SPEC NAA+PROBE: NOT DETECTED
RSV RNA NPH QL NAA+NON-PROBE: NOT DETECTED
SODIUM BLD-SCNC: 138 MMOL/L (ref 136–145)
WBC NRBC COR # BLD: 17.14 10*3/MM3 (ref 4.5–10.7)

## 2017-11-02 PROCEDURE — 87798 DETECT AGENT NOS DNA AMP: CPT | Performed by: HOSPITALIST

## 2017-11-02 PROCEDURE — 97110 THERAPEUTIC EXERCISES: CPT

## 2017-11-02 PROCEDURE — 87486 CHLMYD PNEUM DNA AMP PROBE: CPT | Performed by: HOSPITALIST

## 2017-11-02 PROCEDURE — 87040 BLOOD CULTURE FOR BACTERIA: CPT | Performed by: HOSPITALIST

## 2017-11-02 PROCEDURE — 87633 RESP VIRUS 12-25 TARGETS: CPT | Performed by: HOSPITALIST

## 2017-11-02 PROCEDURE — 71250 CT THORAX DX C-: CPT

## 2017-11-02 PROCEDURE — 80053 COMPREHEN METABOLIC PANEL: CPT | Performed by: HOSPITALIST

## 2017-11-02 PROCEDURE — 74176 CT ABD & PELVIS W/O CONTRAST: CPT

## 2017-11-02 PROCEDURE — 87581 M.PNEUMON DNA AMP PROBE: CPT | Performed by: HOSPITALIST

## 2017-11-02 PROCEDURE — 85025 COMPLETE CBC W/AUTO DIFF WBC: CPT | Performed by: HOSPITALIST

## 2017-11-02 RX ADMIN — MULTIPLE VITAMINS W/ MINERALS TAB 1 TABLET: TAB at 09:19

## 2017-11-02 RX ADMIN — LACTULOSE 30 G: 10 SOLUTION ORAL at 09:19

## 2017-11-02 RX ADMIN — CALCIUM ACETATE MONOHYDRATE AND ALUMINUM SULFATE TETRADECAHYDRATE 1 PACKET: 952; 1347 POWDER, FOR SOLUTION TOPICAL at 21:35

## 2017-11-02 RX ADMIN — CALCIUM ACETATE MONOHYDRATE AND ALUMINUM SULFATE TETRADECAHYDRATE 1 PACKET: 952; 1347 POWDER, FOR SOLUTION TOPICAL at 07:00

## 2017-11-02 RX ADMIN — APIXABAN 5 MG: 2.5 TABLET, FILM COATED ORAL at 18:34

## 2017-11-02 RX ADMIN — RIFAXIMIN 550 MG: 550 TABLET ORAL at 21:35

## 2017-11-02 RX ADMIN — LACTULOSE 30 G: 10 SOLUTION ORAL at 21:35

## 2017-11-02 RX ADMIN — RIFAXIMIN 550 MG: 550 TABLET ORAL at 09:19

## 2017-11-02 RX ADMIN — METOPROLOL SUCCINATE 12.5 MG: 25 TABLET, FILM COATED, EXTENDED RELEASE ORAL at 21:35

## 2017-11-02 RX ADMIN — PANTOPRAZOLE SODIUM 40 MG: 40 TABLET, DELAYED RELEASE ORAL at 07:00

## 2017-11-02 RX ADMIN — APIXABAN 5 MG: 2.5 TABLET, FILM COATED ORAL at 09:19

## 2017-11-02 RX ADMIN — CALCIUM ACETATE MONOHYDRATE AND ALUMINUM SULFATE TETRADECAHYDRATE 1 PACKET: 952; 1347 POWDER, FOR SOLUTION TOPICAL at 13:49

## 2017-11-02 RX ADMIN — LACTULOSE 30 G: 10 SOLUTION ORAL at 18:34

## 2017-11-02 NOTE — PLAN OF CARE
Problem: Patient Care Overview (Adult)  Goal: Plan of Care Review  Outcome: Ongoing (interventions implemented as appropriate)    11/02/17 0844   Coping/Psychosocial Response Interventions   Plan Of Care Reviewed With patient   Patient Care Overview   Progress progress towards functional goals is fair   Outcome Evaluation   Outcome Summary/Follow up Plan PT STABLE AND WITHOUT DISTRESS THIS SHIFT, CONTINUE SKIN CARE FOR EXCORIATED CROW AREA, PT HAS FREQUENT INCONTINENT STOOLS, TURN AND CLEAN Q 2 HOURS AROUND THE CLOCK. CONTINUE PLAN OF CARE       Goal: Adult Individualization and Mutuality  Outcome: Ongoing (interventions implemented as appropriate)  Goal: Discharge Needs Assessment  Outcome: Ongoing (interventions implemented as appropriate)    Problem: Confusion, Acute (Adult)  Goal: Cognitive/Functional Impairments Minimized  Outcome: Ongoing (interventions implemented as appropriate)  Goal: Safety  Outcome: Ongoing (interventions implemented as appropriate)    Problem: Fall Risk (Adult)  Goal: Absence of Falls  Outcome: Ongoing (interventions implemented as appropriate)    Problem: Pressure Ulcer Risk (Marco Antonio Scale) (Adult,Obstetrics,Pediatric)  Goal: Skin Integrity  Outcome: Ongoing (interventions implemented as appropriate)    Problem: Liver Failure, Acute/Chronic (Adult)  Goal: Signs and Symptoms of Listed Potential Problems Will be Absent or Manageable (Liver Failure, Acute/Chronic)  Outcome: Ongoing (interventions implemented as appropriate)    Problem: Infection, Risk/Actual (Adult)  Goal: Infection Prevention/Resolution  Outcome: Ongoing (interventions implemented as appropriate)

## 2017-11-02 NOTE — PLAN OF CARE
Problem: Patient Care Overview (Adult)  Goal: Plan of Care Review    11/02/17 1615   Coping/Psychosocial Response Interventions   Plan Of Care Reviewed With patient   Outcome Evaluation   Outcome Summary/Follow up Plan Pt w/ impaired strength, activity tolerance and standing balance during PT. Able to take few steps bed>chair w/ walker. Recommend return to [facility].

## 2017-11-02 NOTE — PROGRESS NOTES
[facility]               [facility]     LOS: 7 days     Name: Dorothy A Ochsner  Age: 81 y.o.  Sex: female  :  1935  MRN: [id number]         Primary Care Physician: Charly Ryder MD    Diet Dysphagia; IV - Mechanical Soft No Mixed Consistencies; Thin    Subjective   Patient denies complaints but says has diarrhea off and on. Discussed with RN loose stools since yesterday. Discussed with Dr. Martinez. Time: 35 minutes, greater than 50% spent in counseling and coordination of care.    Objective   Temp:  [97.5 °F (36.4 °C)-101.9 °F (38.8 °C)] 99.1 °F (37.3 °C)  Heart Rate:  [66-86] 73  Resp:  [16] 16  BP: (134-147)/(42-59) 141/42  SpO2:  [94 %-100 %] 100 %  on  Flow (L/min):  [2] 2;   O2 Device: room air  Body mass index is 35.43 kg/(m^2).    Physical Exam   Constitutional: No distress.   Cardiovascular: Normal rate and regular rhythm.    Pulmonary/Chest: Effort normal and breath sounds normal. No respiratory distress.   Abdominal: Soft. There is no tenderness. There is no rebound and no guarding.   Musculoskeletal: She exhibits edema (chronic changes).   Neurological: She is alert.   Oriented to hospital and 2017 but not sure why she is here     Reviewed medications and new clinical results    aluminum sulfate-calcium acetate 1 packet Topical Q8H   apixaban 5 mg Oral BID   lactulose 30 g Oral TID   latanoprost 1 drop Both Eyes Nightly   metoprolol succinate XL 12.5 mg Oral Q12H   multivitamin with minerals 1 tablet Oral Daily   pantoprazole 40 mg Oral QAM   rifAXIMin 550 mg Oral Q12H        Results from last 7 days  Lab Units 17  0424 17  0425 10/31/17  0413 10/30/17  0615 10/29/17  0450 10/28/17  0441   WBC 10*3/mm3 17.14* 13.06* 11.00* 10.21 13.12* 13.97*   HEMOGLOBIN g/dL 10.3* 10.5* 10.6* 11.2* 10.4* 11.1*   PLATELETS 10*3/mm3 150 145 132* 110* 107* 156       Results from last 7 days  Lab Units 17  0424 17  0425 10/31/17  1200 10/31/17  0413  10/30/17  0615 10/29/17  0450 10/28/17  0441   SODIUM mmol/L 138 135*  --  136 139 143 145   POTASSIUM mmol/L 3.8 3.9 3.7 3.4* 3.6 3.4* 3.3*   CHLORIDE mmol/L 100 97*  --  99 100 106 106   CO2 mmol/L 28.1 25.2  --  26.6 28.6 25.0 23.3   BUN mg/dL 23 22  --  23 24* 22 21   CREATININE mg/dL 1.04* 0.92  --  0.90 1.13* 1.17* 1.18*   CALCIUM mg/dL 9.1 9.1  --  8.9 9.6 9.8 10.0   GLUCOSE mg/dL 113* 91  --  100* 141* 109* 139*     Glucose   Date/Time Value Ref Range Status   10/31/2017 1610 123 70 - 130 mg/dL Final     Estimated Creatinine Clearance: 43.7 mL/min (by C-G formula based on Cr of 1.04).    Assessment/Plan   Active Hospital Problems (** Indicates Principal Problem)    Diagnosis Date Noted   • **Hepatic encephalopathy [K72.90] 10/26/2017   • Leukocytosis [D72.829] 11/02/2017   • Long term current use of anticoagulant [Z79.01] 11/02/2017   • DNR (do not resuscitate) [Z66] 10/27/2017   • Cirrhosis of liver [K74.60] 10/26/2017   • Atrial fibrillation [I48.91] 10/26/2017   • UTI (urinary tract infection) [N39.0] 10/26/2017   • Hypertension [I10] 10/26/2017      Resolved Hospital Problems    Diagnosis Date Noted Date Resolved   No resolved problems to display.     · Encephalopathy improved/stable  · Worsening leukocytosis: eval for c.diff or other. CT c/a/p.  · Discussed with RN some blood tinged will check UA and occult blood  · On eliquis for afib    Micah Will MD   11/02/17  2:55 PM

## 2017-11-02 NOTE — SIGNIFICANT NOTE
11/02/17 0936   Rehab Treatment   Discipline physical therapist   Treatment Not Performed patient/family declined treatment  (pt eating breakfast, will check back )   Recommendation   PT - Next Appointment 11/02/17

## 2017-11-02 NOTE — PLAN OF CARE
Problem: Patient Care Overview (Adult)  Goal: Plan of Care Review  Outcome: Ongoing (interventions implemented as appropriate)    11/02/17 1349 11/02/17 1459   Coping/Psychosocial Response Interventions   Plan Of Care Reviewed With patient --    Patient Care Overview   Progress --  no change   Outcome Evaluation   Outcome Summary/Follow up Plan --  VSS; waiting for results of the CT of the abdomen and chest; pt. in isolation for rule out c-diff; resp. viral panel sent; pt. getting domboro soaks three times a day for excoriated buttocks       Goal: Adult Individualization and Mutuality  Outcome: Ongoing (interventions implemented as appropriate)    11/02/17 1459   Individualization   Patient Specific Interventions monitor labs and vitals       Goal: Discharge Needs Assessment  Outcome: Ongoing (interventions implemented as appropriate)    Problem: Confusion, Acute (Adult)  Goal: Cognitive/Functional Impairments Minimized  Outcome: Ongoing (interventions implemented as appropriate)  Goal: Safety  Outcome: Ongoing (interventions implemented as appropriate)    Problem: Fall Risk (Adult)  Goal: Absence of Falls  Outcome: Ongoing (interventions implemented as appropriate)    Problem: Pressure Ulcer Risk (Marco Antonio Scale) (Adult,Obstetrics,Pediatric)  Goal: Skin Integrity  Outcome: Ongoing (interventions implemented as appropriate)    Problem: Liver Failure, Acute/Chronic (Adult)  Goal: Signs and Symptoms of Listed Potential Problems Will be Absent or Manageable (Liver Failure, Acute/Chronic)  Outcome: Ongoing (interventions implemented as appropriate)    Problem: Infection, Risk/Actual (Adult)  Goal: Infection Prevention/Resolution  Outcome: Ongoing (interventions implemented as appropriate)

## 2017-11-02 NOTE — PROGRESS NOTES
ASSOCIATES      LOS: 4 days      Subjective:  Eating well     No new complaints     No cp  No soa        Objective:     Vital Signs:  Afebrile overnight  61  115/46  99% on RA     General Appearance: no acute distress, appears comfortable  Heart: regular rate and rhythm  Lungs: clear to auscultation bilaterally, respirations unlabored, good air entry  Abdomen: soft, non-tender, no guarding, no rebound, non-distended  Extremities: no edema, no cyanosis  Neurology: speech normal, CN grossly normal, but confused, patient gets upset when asking questions about orientation  Psychiatric: normal mood and affect     Results Review:          Glucose   Date Value Ref Range Status   10/30/2017 141 (H) 65 - 99 mg/dL Final   10/29/2017 109 (H) 65 - 99 mg/dL Final   10/28/2017 139 (H) 65 - 99 mg/dL Final         Results from last 7 days  Lab Units 10/30/17  0615   WBC 10*3/mm3 10.21   HEMOGLOBIN g/dL 11.2*   HEMATOCRIT % 34.4*   PLATELETS 10*3/mm3 110*       wbc is 13       Results from last 7 days  Lab Units 10/30/17  0615   SODIUM mmol/L 139   POTASSIUM mmol/L 3.6   CHLORIDE mmol/L 100   CO2 mmol/L 28.6   BUN mg/dL 24*   CREATININE mg/dL 1.13*   CALCIUM mg/dL 9.6   BILIRUBIN mg/dL 1.4*   ALK PHOS U/L 113   ALT (SGPT) U/L 12   AST (SGOT) U/L 24   GLUCOSE mg/dL 141*       creatinine is stable    Results from last 7 days  Lab Units 10/29/17  0450 10/26/17  1033   INR    --  1.39*   APTT seconds 43.7* 34.6                Results from last 7 days  Lab Units 10/26/17  1143   TROPONIN T ng/mL <0.010                                    Urine Culture   Date Value Ref Range Status   10/26/2017 >100,000 CFU/mL Escherichia coli (A)   Final          I have reviewed daily medications and changes in CPOE     Scheduled meds     apixaban 5 mg Oral BID   ertapenem 1 g Intravenous Q24H   lactulose 30 g Oral TID   latanoprost 1 drop Both Eyes Nightly   metoprolol succinate XL 12.5 mg Oral Q12H   multivitamin with minerals 1 tablet Oral Daily    pantoprazole 40 mg Oral QAM   rifAXIMin 550 mg Oral Q12H             Interpretation Summary       · The quality of the study is limited due to poor acoustic windows related to an uncooperative patient.  · Left ventricular systolic function is normal. Calculated EF = 65.8%. Estimated EF was in agreement with the calculated EF. Estimated EF = 66%. Normal left ventricular cavity size and wall thickness noted. Multiple wall segments not visualized due to poor patient cooperation Left ventricular diastolic dysfunction is noted (grade II w/high LAP) consistent with pseudonormalization.  · Multiple wall segments are not visualized due to poor patient cooperation  · Small patent foramen ovale present. Saline test results are positive with valsalva manuever.  · Moderate mitral annular calcification is present. Mild mitral valve regurgitation is present.  · Trace tricuspid valve regurgitation is present. Estimated right ventricular systolic pressure from tricuspid regurgitation is mildly elevated (35-45 mmHg). Calculated right ventricular systolic pressure from tricuspid regurgitation is 38 mmHg                 Narrative:           · Proximal right internal carotid artery mild stenosis.  · Proximal left internal carotid artery mild stenosis            PRN meds  •  acetaminophen  •  influenza vaccine  •  ondansetron  •  sodium chloride        Principal Problem:    Hepatic encephalopathy  Active Problems:    Cirrhosis of liver    Atrial fibrillation    UTI (urinary tract infection)    Hypertension    DNR (do not resuscitate)           Assessment/Plan:          Hepatic encephalopathy- ammonia level is better, answers questions appropriately but only oriented x person and hospital       Alcoholic Cirrhosis of liver- GI has seen and recommends f/u with Dr Andino       Atrial fibrillation- eliquis 5 bid       UTI (urinary tract infection)- levaquin stopped, no fevers overnight, wbc is 13, urine cx with ecoli, invanz, ID is  following and has ordered a chest xray done and does not show any new infiltrate       Hypertension- ok     hypokalemia- small amount of replacement, check in the am.       DNR (do not resuscitate)     Plan:  D/w Dr Sandhu and the WBC is elevating so he is getting a chest xray.    Michael Martinez MD  11/1/17  1200    Addendum: 900am 11/2: d/w Dr Will this am and with higher wbc will get blood cx, c diff (if having diarrhea), and resp pathogen panel. Dr Will to see today.

## 2017-11-02 NOTE — PLAN OF CARE
Problem: Patient Care Overview (Adult)  Goal: Plan of Care Review  Outcome: Ongoing (interventions implemented as appropriate)    11/01/17 0650 11/01/17 2024   Coping/Psychosocial Response Interventions   Plan Of Care Reviewed With --  patient;spouse   Patient Care Overview   Progress improving --    Outcome Evaluation   Outcome Summary/Follow up Plan --  Pt up to chair twice today, heavy assist of 2, cont confusion, perianal area excoriated, [place] soaks started, barrier cream used, turning every 2 hrs, safety maintained.       Goal: Adult Individualization and Mutuality  Outcome: Ongoing (interventions implemented as appropriate)  Goal: Discharge Needs Assessment  Outcome: Ongoing (interventions implemented as appropriate)    Problem: Confusion, Acute (Adult)  Goal: Cognitive/Functional Impairments Minimized  Outcome: Ongoing (interventions implemented as appropriate)  Goal: Safety  Outcome: Ongoing (interventions implemented as appropriate)    Problem: Fall Risk (Adult)  Goal: Absence of Falls  Outcome: Ongoing (interventions implemented as appropriate)    Problem: Pressure Ulcer Risk (Marco Antonio Scale) (Adult,Obstetrics,Pediatric)  Goal: Skin Integrity  Outcome: Ongoing (interventions implemented as appropriate)    Problem: Liver Failure, Acute/Chronic (Adult)  Goal: Signs and Symptoms of Listed Potential Problems Will be Absent or Manageable (Liver Failure, Acute/Chronic)  Outcome: Ongoing (interventions implemented as appropriate)

## 2017-11-03 PROBLEM — A04.72 C. DIFFICILE COLITIS: Status: ACTIVE | Noted: 2017-11-03

## 2017-11-03 LAB
ALBUMIN SERPL-MCNC: 2.6 G/DL (ref 3.5–5.2)
ALBUMIN/GLOB SERPL: 0.7 G/DL
ALP SERPL-CCNC: 135 U/L (ref 39–117)
ALT SERPL W P-5'-P-CCNC: 15 U/L (ref 1–33)
ANION GAP SERPL CALCULATED.3IONS-SCNC: 10.5 MMOL/L
AST SERPL-CCNC: 34 U/L (ref 1–32)
BACTERIA UR QL AUTO: ABNORMAL /HPF
BASOPHILS # BLD AUTO: 0.07 10*3/MM3 (ref 0–0.2)
BASOPHILS NFR BLD AUTO: 0.5 % (ref 0–1.5)
BILIRUB SERPL-MCNC: 0.8 MG/DL (ref 0.1–1.2)
BILIRUB UR QL STRIP: NEGATIVE
BUN BLD-MCNC: 18 MG/DL (ref 8–23)
BUN/CREAT SERPL: 20.9 (ref 7–25)
C DIFF TOX GENS STL QL NAA+PROBE: POSITIVE
CALCIUM SPEC-SCNC: 9.2 MG/DL (ref 8.6–10.5)
CHLORIDE SERPL-SCNC: 102 MMOL/L (ref 98–107)
CLARITY UR: CLEAR
CO2 SERPL-SCNC: 26.5 MMOL/L (ref 22–29)
COLOR UR: ABNORMAL
CREAT BLD-MCNC: 0.86 MG/DL (ref 0.57–1)
DEPRECATED RDW RBC AUTO: 52.1 FL (ref 37–54)
EOSINOPHIL # BLD AUTO: 0.64 10*3/MM3 (ref 0–0.7)
EOSINOPHIL NFR BLD AUTO: 4.5 % (ref 0.3–6.2)
ERYTHROCYTE [DISTWIDTH] IN BLOOD BY AUTOMATED COUNT: 13.8 % (ref 11.7–13)
GFR SERPL CREATININE-BSD FRML MDRD: 63 ML/MIN/1.73
GLOBULIN UR ELPH-MCNC: 3.9 GM/DL
GLUCOSE BLD-MCNC: 126 MG/DL (ref 65–99)
GLUCOSE UR STRIP-MCNC: NEGATIVE MG/DL
HCT VFR BLD AUTO: 30.6 % (ref 35.6–45.5)
HEMOCCULT STL QL: NEGATIVE
HGB BLD-MCNC: 10.1 G/DL (ref 11.9–15.5)
HGB UR QL STRIP.AUTO: NEGATIVE
HYALINE CASTS UR QL AUTO: ABNORMAL /LPF
IMM GRANULOCYTES # BLD: 0.16 10*3/MM3 (ref 0–0.03)
IMM GRANULOCYTES NFR BLD: 1.1 % (ref 0–0.5)
KETONES UR QL STRIP: ABNORMAL
LEUKOCYTE ESTERASE UR QL STRIP.AUTO: ABNORMAL
LYMPHOCYTES # BLD AUTO: 2.95 10*3/MM3 (ref 0.9–4.8)
LYMPHOCYTES NFR BLD AUTO: 20.7 % (ref 19.6–45.3)
MCH RBC QN AUTO: 34.6 PG (ref 26.9–32)
MCHC RBC AUTO-ENTMCNC: 33 G/DL (ref 32.4–36.3)
MCV RBC AUTO: 104.8 FL (ref 80.5–98.2)
MONOCYTES # BLD AUTO: 2.14 10*3/MM3 (ref 0.2–1.2)
MONOCYTES NFR BLD AUTO: 15 % (ref 5–12)
NEUTROPHILS # BLD AUTO: 8.27 10*3/MM3 (ref 1.9–8.1)
NEUTROPHILS NFR BLD AUTO: 58.2 % (ref 42.7–76)
NITRITE UR QL STRIP: NEGATIVE
PH UR STRIP.AUTO: 5.5 [PH] (ref 5–8)
PLATELET # BLD AUTO: 140 10*3/MM3 (ref 140–500)
PMV BLD AUTO: 12.1 FL (ref 6–12)
POTASSIUM BLD-SCNC: 3.5 MMOL/L (ref 3.5–5.2)
PROT SERPL-MCNC: 6.5 G/DL (ref 6–8.5)
PROT UR QL STRIP: NEGATIVE
RBC # BLD AUTO: 2.92 10*6/MM3 (ref 3.9–5.2)
RBC # UR: ABNORMAL /HPF
REF LAB TEST METHOD: ABNORMAL
SODIUM BLD-SCNC: 139 MMOL/L (ref 136–145)
SP GR UR STRIP: 1.02 (ref 1–1.03)
SQUAMOUS #/AREA URNS HPF: ABNORMAL /HPF
UROBILINOGEN UR QL STRIP: ABNORMAL
WBC NRBC COR # BLD: 14.23 10*3/MM3 (ref 4.5–10.7)
WBC UR QL AUTO: ABNORMAL /HPF

## 2017-11-03 PROCEDURE — 80053 COMPREHEN METABOLIC PANEL: CPT | Performed by: HOSPITALIST

## 2017-11-03 PROCEDURE — 87493 C DIFF AMPLIFIED PROBE: CPT | Performed by: HOSPITALIST

## 2017-11-03 PROCEDURE — 82272 OCCULT BLD FECES 1-3 TESTS: CPT | Performed by: HOSPITALIST

## 2017-11-03 PROCEDURE — 81001 URINALYSIS AUTO W/SCOPE: CPT | Performed by: HOSPITALIST

## 2017-11-03 PROCEDURE — 85025 COMPLETE CBC W/AUTO DIFF WBC: CPT | Performed by: HOSPITALIST

## 2017-11-03 RX ADMIN — LACTULOSE 30 G: 10 SOLUTION ORAL at 22:07

## 2017-11-03 RX ADMIN — LACTULOSE 30 G: 10 SOLUTION ORAL at 09:41

## 2017-11-03 RX ADMIN — VANCOMYCIN 125 MG: KIT at 18:42

## 2017-11-03 RX ADMIN — APIXABAN 5 MG: 2.5 TABLET, FILM COATED ORAL at 18:42

## 2017-11-03 RX ADMIN — RIFAXIMIN 550 MG: 550 TABLET ORAL at 09:41

## 2017-11-03 RX ADMIN — METOPROLOL SUCCINATE 12.5 MG: 25 TABLET, FILM COATED, EXTENDED RELEASE ORAL at 22:09

## 2017-11-03 RX ADMIN — MULTIPLE VITAMINS W/ MINERALS TAB 1 TABLET: TAB at 09:41

## 2017-11-03 RX ADMIN — LACTULOSE 30 G: 10 SOLUTION ORAL at 16:32

## 2017-11-03 RX ADMIN — APIXABAN 5 MG: 2.5 TABLET, FILM COATED ORAL at 09:41

## 2017-11-03 RX ADMIN — RIFAXIMIN 550 MG: 550 TABLET ORAL at 22:07

## 2017-11-03 RX ADMIN — CALCIUM ACETATE MONOHYDRATE AND ALUMINUM SULFATE TETRADECAHYDRATE 1 PACKET: 952; 1347 POWDER, FOR SOLUTION TOPICAL at 22:08

## 2017-11-03 RX ADMIN — PANTOPRAZOLE SODIUM 40 MG: 40 TABLET, DELAYED RELEASE ORAL at 09:41

## 2017-11-03 RX ADMIN — LATANOPROST 1 DROP: 50 SOLUTION OPHTHALMIC at 22:37

## 2017-11-03 RX ADMIN — CALCIUM ACETATE MONOHYDRATE AND ALUMINUM SULFATE TETRADECAHYDRATE 1 PACKET: 952; 1347 POWDER, FOR SOLUTION TOPICAL at 14:32

## 2017-11-03 RX ADMIN — METOPROLOL SUCCINATE 12.5 MG: 25 TABLET, FILM COATED, EXTENDED RELEASE ORAL at 09:41

## 2017-11-03 RX ADMIN — VANCOMYCIN 125 MG: KIT at 16:32

## 2017-11-03 NOTE — PROGRESS NOTES
Continued Stay Note  [facility]     Patient Name: Dorothy A Ochsner  MRN: [id number]  Today's Date: 11/3/2017    Admit Date: 10/26/2017          Discharge Plan       11/03/17 1335    Case Management/Social Work Plan    Plan [facility]    Additional Comments Spoke to patient and spouse at bedside.  Pt states she plans to go to [facility]  No change in plan              Discharge Codes     None            Virginia Kemp RN

## 2017-11-03 NOTE — SIGNIFICANT NOTE
11/03/17 1551   Rehab Treatment   Discipline physical therapy assistant   Treatment Not Performed patient/family decline treatment, pt not feeling well  (Pt stated that she wants to sleep and be left alone.  Nurse Funmi said ok because pt is having diarrhea today.  PT to follow up tomorrow. )   Recommendation   PT - Next Appointment 11/04/17

## 2017-11-03 NOTE — PROGRESS NOTES
[facility]               [facility]     LOS: 8 days     Name: Dorothy A Ochsner  Age: 81 y.o.  Sex: female  :  1935  MRN: [id number]         Primary Care Physician: Charly Ryder MD    Diet Dysphagia; IV - Mechanical Soft No Mixed Consistencies; Thin    Subjective   Patient denies complaints. Discussed with RN. Some apparent blood in urine. Denies any nausea or abdominal pain.    Objective   Temp:  [98 °F (36.7 °C)-98.7 °F (37.1 °C)] 98 °F (36.7 °C)  Heart Rate:  [60-80] 80  Resp:  [16] 16  BP: (129-165)/(38-73) 165/73  SpO2:  [94 %-100 %] 100 %  on   ;   O2 Device: room air  Body mass index is 35.43 kg/(m^2).    Physical Exam   Constitutional: No distress.   Cardiovascular: Normal rate and regular rhythm.    Pulmonary/Chest: Effort normal and breath sounds normal. No respiratory distress.   Abdominal: Soft. There is no tenderness. There is no rebound and no guarding.   Musculoskeletal: She exhibits edema (chronic changes).   Neurological: She is alert.   Oriented to hospital   Skin: Skin is warm and dry.     Reviewed medications and new clinical results    aluminum sulfate-calcium acetate 1 packet Topical Q8H   apixaban 5 mg Oral BID   lactulose 30 g Oral TID   latanoprost 1 drop Both Eyes Nightly   metoprolol succinate XL 12.5 mg Oral Q12H   multivitamin with minerals 1 tablet Oral Daily   pantoprazole 40 mg Oral QAM   rifAXIMin 550 mg Oral Q12H   vancomycin 125 mg Oral Q6H        Results from last 7 days  Lab Units 177 17  0424 17  0425 10/31/17  0413 10/30/17  0615 10/29/17  0450 10/28/17  0441   WBC 10*3/mm3 14.23* 17.14* 13.06* 11.00* 10.21 13.12* 13.97*   HEMOGLOBIN g/dL 10.1* 10.3* 10.5* 10.6* 11.2* 10.4* 11.1*   PLATELETS 10*3/mm3 140 150 145 132* 110* 107* 156       Results from last 7 days  Lab Units 17  0424 17  0425 10/31/17  1200 10/31/17  0413 10/30/17  0615 10/29/17  0450 10/28/17  0441   SODIUM mmol/L 139 138  135*  --  136 139 143 145   POTASSIUM mmol/L 3.5 3.8 3.9 3.7 3.4* 3.6 3.4* 3.3*   CHLORIDE mmol/L 102 100 97*  --  99 100 106 106   CO2 mmol/L 26.5 28.1 25.2  --  26.6 28.6 25.0 23.3   BUN mg/dL 18 23 22  --  23 24* 22 21   CREATININE mg/dL 0.86 1.04* 0.92  --  0.90 1.13* 1.17* 1.18*   CALCIUM mg/dL 9.2 9.1 9.1  --  8.9 9.6 9.8 10.0   GLUCOSE mg/dL 126* 113* 91  --  100* 141* 109* 139*     Glucose   Date/Time Value Ref Range Status   10/31/2017 1610 123 70 - 130 mg/dL Final     Estimated Creatinine Clearance: 52.8 mL/min (by C-G formula based on Cr of 0.86).    Assessment/Plan   Active Hospital Problems (** Indicates Principal Problem)    Diagnosis Date Noted   • **Hepatic encephalopathy [K72.90] 10/26/2017   • Leukocytosis [D72.829] 11/02/2017   • Long term current use of anticoagulant [Z79.01] 11/02/2017   • Alcoholic cirrhosis of liver [K70.30] 11/02/2017   • DNR (do not resuscitate) [Z66] 10/27/2017   • Cirrhosis of liver [K74.60] 10/26/2017   • Atrial fibrillation [I48.91] 10/26/2017   • UTI (urinary tract infection) [N39.0] 10/26/2017   • Hypertension [I10] 10/26/2017      Resolved Hospital Problems    Diagnosis Date Noted Date Resolved   No resolved problems to display.     · Encephalopathy improved/stable  · Vanc PO for c.diff  · Check for blood in urine, stool. Hgb stable  · On eliquis for afib    Micah Will MD   11/03/17  2:58 PM

## 2017-11-03 NOTE — PLAN OF CARE
Problem: Patient Care Overview (Adult)  Goal: Plan of Care Review  Outcome: Ongoing (interventions implemented as appropriate)    11/03/17 1915   Coping/Psychosocial Response Interventions   Plan Of Care Reviewed With patient   Patient Care Overview   Progress no change   Outcome Evaluation   Outcome Summary/Follow up Plan Positive for C-Diff toxin. Started on oral Vancomycin. Continues with diarrhea. Straight cath'd for UA. Blood noted in stool or urine but unable to tell which, specimen mixed but urine and stool lab results negative for blood. Monitor labs. Groin, zena area and buttocks excoriated with shearing (open areas) noted. Z-guard ointment applied.        Goal: Adult Individualization and Mutuality  Outcome: Ongoing (interventions implemented as appropriate)  Goal: Discharge Needs Assessment  Outcome: Ongoing (interventions implemented as appropriate)

## 2017-11-03 NOTE — PLAN OF CARE
Problem: Confusion, Acute (Adult)  Goal: Cognitive/Functional Impairments Minimized  Outcome: Ongoing (interventions implemented as appropriate)  Goal: Safety  Outcome: Ongoing (interventions implemented as appropriate)    Problem: Fall Risk (Adult)  Goal: Absence of Falls  Outcome: Ongoing (interventions implemented as appropriate)    Problem: Pressure Ulcer Risk (Marco Antonio Scale) (Adult,Obstetrics,Pediatric)  Goal: Skin Integrity  Outcome: Ongoing (interventions implemented as appropriate)    Problem: Liver Failure, Acute/Chronic (Adult)  Goal: Signs and Symptoms of Listed Potential Problems Will be Absent or Manageable (Liver Failure, Acute/Chronic)  Outcome: Ongoing (interventions implemented as appropriate)    Problem: Infection, Risk/Actual (Adult)  Goal: Infection Prevention/Resolution  Outcome: Ongoing (interventions implemented as appropriate)

## 2017-11-03 NOTE — PLAN OF CARE
Problem: Patient Care Overview (Adult)  Goal: Plan of Care Review  Outcome: Ongoing (interventions implemented as appropriate)    11/03/17 0354   Coping/Psychosocial Response Interventions   Plan Of Care Reviewed With patient   Patient Care Overview   Progress progress towards functional goals is fair   Outcome Evaluation   Outcome Summary/Follow up Plan PT IN NO DISTRESS THIS SHIFT, CONTINUE VIGILANT SKIN CARE ON CROW AREA, MONITOR VS, LABS AND NEURO STATUS       Goal: Adult Individualization and Mutuality  Outcome: Ongoing (interventions implemented as appropriate)  Goal: Discharge Needs Assessment  Outcome: Ongoing (interventions implemented as appropriate)    Problem: Confusion, Acute (Adult)  Goal: Cognitive/Functional Impairments Minimized  Outcome: Ongoing (interventions implemented as appropriate)  Goal: Safety  Outcome: Ongoing (interventions implemented as appropriate)    Problem: Fall Risk (Adult)  Goal: Absence of Falls  Outcome: Ongoing (interventions implemented as appropriate)    Problem: Pressure Ulcer Risk (Marco Antonio Scale) (Adult,Obstetrics,Pediatric)  Goal: Skin Integrity  Outcome: Ongoing (interventions implemented as appropriate)    Problem: Liver Failure, Acute/Chronic (Adult)  Goal: Signs and Symptoms of Listed Potential Problems Will be Absent or Manageable (Liver Failure, Acute/Chronic)  Outcome: Ongoing (interventions implemented as appropriate)    Problem: Infection, Risk/Actual (Adult)  Goal: Infection Prevention/Resolution  Outcome: Ongoing (interventions implemented as appropriate)

## 2017-11-03 NOTE — PROGRESS NOTES
"  Infectious Diseases Progress Note    Andrew Sandhu MD     [facility]  [place]: 8 days  Patient Identification:  Name: Dorothy A Ochsner  Age: 81 y.o.  Sex: female  :  1935  MRN: [id number]         Primary Care Physician: Cahrly Ryder MD            Subjective: She says that she's feeling better denies any specific complaints.  Claims that her appetite is better  Interval History: See consultation note    Objective:    Scheduled Meds:    aluminum sulfate-calcium acetate 1 packet Topical Q8H   apixaban 5 mg Oral BID   lactulose 30 g Oral TID   latanoprost 1 drop Both Eyes Nightly   metoprolol succinate XL 12.5 mg Oral Q12H   multivitamin with minerals 1 tablet Oral Daily   pantoprazole 40 mg Oral QAM   rifAXIMin 550 mg Oral Q12H     Continuous Infusions:     Vital signs in last 24 hours:  Temp:  [98.3 °F (36.8 °C)-99.1 °F (37.3 °C)] 98.3 °F (36.8 °C)  Heart Rate:  [60-73] 61  Resp:  [16] 16  BP: (129-141)/(38-57) 135/51    Intake/Output:    Intake/Output Summary (Last 24 hours) at 17 0810  Last data filed at 17 1700   Gross per 24 hour   Intake              420 ml   Output                0 ml   Net              420 ml       Exam:  /51 (BP Location: Right arm, Patient Position: Lying)  Pulse 61  Temp 98.3 °F (36.8 °C) (Oral)   Resp 16  Ht 62\" (157.5 cm)  Wt 193 lb 11.2 oz (87.9 kg)  SpO2 94%  BMI 35.43 kg/m2    General Appearance:    Awake and interactive chronically ill but does not appear toxic                          Head:    Normocephalic, without obvious abnormality, atraumatic                           Eyes:    PERRL, conjunctiva/corneas clear, EOM's intact, both eyes                         Throat:   Lips, tongue, gums normal; oral mucosa pink and moist                           Neck:   Supple, symmetrical, trachea midline, no JVD                         Lungs:    Clear to auscultation bilaterally, respirations unlabored                 Chest Wall:    No tenderness or " deformity                          Heart:    Regular rate and rhythm, S1 and S2 normal, no murmur,no  Rub                                      or gallop                  Abdomen:     No significant tenderness in the left lower quadrant.                 Extremities:   Extremities normal, atraumatic, no cyanosis or edema                        Pulses:   Pulses palpable in all extremities                            Skin:   Skin is warm and dry,  no rashes or palpable lesions              Data Review:    I reviewed the patient's new clinical results.    Results from last 7 days  Lab Units 11/03/17 0407 11/02/17 0424 11/01/17  0425 10/31/17  0413 10/30/17  0615 10/29/17  0450 10/28/17  0441   WBC 10*3/mm3 14.23* 17.14* 13.06* 11.00* 10.21 13.12* 13.97*   HEMOGLOBIN g/dL 10.1* 10.3* 10.5* 10.6* 11.2* 10.4* 11.1*   PLATELETS 10*3/mm3 140 150 145 132* 110* 107* 156       Results from last 7 days  Lab Units 11/03/17 0407 11/02/17 0424 11/01/17  0425 10/31/17  1200 10/31/17  0413 10/30/17  0615 10/29/17  0450 10/28/17  0441   SODIUM mmol/L 139 138 135*  --  136 139 143 145   POTASSIUM mmol/L 3.5 3.8 3.9 3.7 3.4* 3.6 3.4* 3.3*   CHLORIDE mmol/L 102 100 97*  --  99 100 106 106   CO2 mmol/L 26.5 28.1 25.2  --  26.6 28.6 25.0 23.3   BUN mg/dL 18 23 22  --  23 24* 22 21   CREATININE mg/dL 0.86 1.04* 0.92  --  0.90 1.13* 1.17* 1.18*   CALCIUM mg/dL 9.2 9.1 9.1  --  8.9 9.6 9.8 10.0   GLUCOSE mg/dL 126* 113* 91  --  100* 141* 109* 139*       Microbiology Results (last 10 days)     Procedure Component Value - Date/Time    Respiratory Panel, PCR - Swab, Nasopharynx [[id number]]  (Normal) Collected:  11/02/17 1053    Lab Status:  Final result Specimen:  Swab from Nasopharynx Updated:  11/02/17 1321     ADENOVIRUS, PCR Not Detected     Coronavirus 229E Not Detected     Coronavirus HKU1 Not Detected     Coronavirus NL63 Not Detected     Coronavirus OC43 Not Detected     Human Metapneumovirus Not Detected     Human  Rhinovirus/Enterovirus Not Detected     Influenza B PCR Not Detected     Parainfluenza Virus 1 Not Detected     Parainfluenza Virus 2 Not Detected     Parainfluenza Virus 3 Not Detected     Parainfluenza Virus 4 Not Detected     Bordetella pertussis pcr Not Detected     Influenza 2009 H1N1 by PCR Not Detected     Chlamydophila pneumoniae PCR Not Detected     Mycoplasma pneumo by PCR Not Detected     Influenza A PCR Not Detected     Influenza A H3 Not Detected     Influenza A H1 Not Detected     RSV, PCR Not Detected    Blood Culture - Blood, [[id number]]  (Normal) Collected:  11/02/17 0928    Lab Status:  Preliminary result Specimen:  Blood from Hand, Right Updated:  11/02/17 2214     Blood Culture No growth at less than 24 hours    Blood Culture - Blood, [[id number]]  (Normal) Collected:  11/02/17 0918    Lab Status:  Preliminary result Specimen:  Blood from Arm, Left Updated:  11/02/17 2214     Blood Culture No growth at less than 24 hours    Urine Culture - Urine, Urine, Clean Catch [[id number]]  (Abnormal)  (Susceptibility) Collected:  10/26/17 1225    Lab Status:  Final result Specimen:  Urine from Urine, Clean Catch Updated:  10/28/17 0953     Urine Culture --      >100,000 CFU/mL Escherichia coli (A)    Susceptibility      Escherichia coli     [place]     Amikacin <=2 ug/ml Susceptible     Ampicillin >=32 ug/ml Resistant     Ampicillin + Sulbactam >=32 ug/ml Resistant     Cefazolin >=64 ug/ml Resistant     Cefepime 2 ug/ml Susceptible     Ceftriaxone >=64 ug/ml Resistant     Ciprofloxacin >=4 ug/ml Resistant     Ertapenem <=0.5 ug/ml Susceptible     Gentamicin >=16 ug/ml Resistant     Levofloxacin >=8 ug/ml Resistant     Nitrofurantoin <=16 ug/ml Susceptible     Piperacillin + Tazobactam <=4 ug/ml Susceptible     Tetracycline >=16 ug/ml Resistant     Trimethoprim + Sulfamethoxazole >=320 ug/ml Resistant                        CT scan of the abdomen and pelvis reviewed  Assessment:  Principal Problem:    Hepatic  encephalopathy  Active Problems:    Cirrhosis of liver    Atrial fibrillation    UTI (urinary tract infection)    Hypertension    DNR (do not resuscitate)    Leukocytosis    Long term current use of anticoagulant    Alcoholic cirrhosis of liver      Plan:  She seems to be clinically better and the fact that the wbc count is slightly better after stopping the antibiotics makes one wonder if the reading of diverticulitis on CT scan is of any significance.  Since she is doing better clinically one way to go about it is to observe her off of antibiotic therapy while waiting for C. difficile toxin assay to see how she does clinically and and how her white blood cell count is trending.    Other option is to treat her with Levaquin and Flagyl for diverticulitis for short course of time if she deteriorates while watching his clinical course.  We'll discuss with Dr. Will.  Andrew Sandhu MD  11/3/2017  8:10 AM    Much of this encounter note is an electronic transcription/translation of spoken language to printed text. The electronic translation of spoken language may permit erroneous, or at times, nonsensical words or phrases to be inadvertently transcribed; Although I have reviewed the note for such errors, some may still exist

## 2017-11-04 LAB
ALBUMIN SERPL-MCNC: 2.5 G/DL (ref 3.5–5.2)
ALBUMIN/GLOB SERPL: 0.7 G/DL
ALP SERPL-CCNC: 179 U/L (ref 39–117)
ALT SERPL W P-5'-P-CCNC: 21 U/L (ref 1–33)
ANION GAP SERPL CALCULATED.3IONS-SCNC: 9.5 MMOL/L
AST SERPL-CCNC: 41 U/L (ref 1–32)
BASOPHILS # BLD AUTO: 0.07 10*3/MM3 (ref 0–0.2)
BASOPHILS NFR BLD AUTO: 0.6 % (ref 0–1.5)
BILIRUB SERPL-MCNC: 0.6 MG/DL (ref 0.1–1.2)
BUN BLD-MCNC: 17 MG/DL (ref 8–23)
BUN/CREAT SERPL: 18.9 (ref 7–25)
CALCIUM SPEC-SCNC: 9.4 MG/DL (ref 8.6–10.5)
CHLORIDE SERPL-SCNC: 101 MMOL/L (ref 98–107)
CO2 SERPL-SCNC: 28.5 MMOL/L (ref 22–29)
CREAT BLD-MCNC: 0.9 MG/DL (ref 0.57–1)
DEPRECATED RDW RBC AUTO: 52.5 FL (ref 37–54)
EOSINOPHIL # BLD AUTO: 0.67 10*3/MM3 (ref 0–0.7)
EOSINOPHIL NFR BLD AUTO: 5.9 % (ref 0.3–6.2)
ERYTHROCYTE [DISTWIDTH] IN BLOOD BY AUTOMATED COUNT: 13.7 % (ref 11.7–13)
GFR SERPL CREATININE-BSD FRML MDRD: 60 ML/MIN/1.73
GLOBULIN UR ELPH-MCNC: 3.8 GM/DL
GLUCOSE BLD-MCNC: 122 MG/DL (ref 65–99)
HCT VFR BLD AUTO: 30.4 % (ref 35.6–45.5)
HGB BLD-MCNC: 10 G/DL (ref 11.9–15.5)
IMM GRANULOCYTES # BLD: 0.15 10*3/MM3 (ref 0–0.03)
IMM GRANULOCYTES NFR BLD: 1.3 % (ref 0–0.5)
LYMPHOCYTES # BLD AUTO: 3.46 10*3/MM3 (ref 0.9–4.8)
LYMPHOCYTES NFR BLD AUTO: 30.3 % (ref 19.6–45.3)
MCH RBC QN AUTO: 35 PG (ref 26.9–32)
MCHC RBC AUTO-ENTMCNC: 32.9 G/DL (ref 32.4–36.3)
MCV RBC AUTO: 106.3 FL (ref 80.5–98.2)
MONOCYTES # BLD AUTO: 2.05 10*3/MM3 (ref 0.2–1.2)
MONOCYTES NFR BLD AUTO: 17.9 % (ref 5–12)
NEUTROPHILS # BLD AUTO: 5.03 10*3/MM3 (ref 1.9–8.1)
NEUTROPHILS NFR BLD AUTO: 44 % (ref 42.7–76)
PLATELET # BLD AUTO: 165 10*3/MM3 (ref 140–500)
PMV BLD AUTO: 11.1 FL (ref 6–12)
POTASSIUM BLD-SCNC: 3.7 MMOL/L (ref 3.5–5.2)
PROT SERPL-MCNC: 6.3 G/DL (ref 6–8.5)
RBC # BLD AUTO: 2.86 10*6/MM3 (ref 3.9–5.2)
SODIUM BLD-SCNC: 139 MMOL/L (ref 136–145)
WBC NRBC COR # BLD: 11.43 10*3/MM3 (ref 4.5–10.7)

## 2017-11-04 PROCEDURE — 80053 COMPREHEN METABOLIC PANEL: CPT | Performed by: HOSPITALIST

## 2017-11-04 PROCEDURE — 85025 COMPLETE CBC W/AUTO DIFF WBC: CPT | Performed by: HOSPITALIST

## 2017-11-04 RX ADMIN — RIFAXIMIN 550 MG: 550 TABLET ORAL at 21:48

## 2017-11-04 RX ADMIN — CALCIUM ACETATE MONOHYDRATE AND ALUMINUM SULFATE TETRADECAHYDRATE 1 PACKET: 952; 1347 POWDER, FOR SOLUTION TOPICAL at 21:48

## 2017-11-04 RX ADMIN — MULTIPLE VITAMINS W/ MINERALS TAB 1 TABLET: TAB at 09:41

## 2017-11-04 RX ADMIN — LACTULOSE 30 G: 10 SOLUTION ORAL at 09:42

## 2017-11-04 RX ADMIN — PANTOPRAZOLE SODIUM 40 MG: 40 TABLET, DELAYED RELEASE ORAL at 09:40

## 2017-11-04 RX ADMIN — RIFAXIMIN 550 MG: 550 TABLET ORAL at 09:41

## 2017-11-04 RX ADMIN — VANCOMYCIN 125 MG: KIT at 15:00

## 2017-11-04 RX ADMIN — CALCIUM ACETATE MONOHYDRATE AND ALUMINUM SULFATE TETRADECAHYDRATE 1 PACKET: 952; 1347 POWDER, FOR SOLUTION TOPICAL at 05:22

## 2017-11-04 RX ADMIN — VANCOMYCIN 125 MG: KIT at 17:57

## 2017-11-04 RX ADMIN — LACTULOSE 30 G: 10 SOLUTION ORAL at 17:57

## 2017-11-04 RX ADMIN — LATANOPROST 1 DROP: 50 SOLUTION OPHTHALMIC at 22:40

## 2017-11-04 RX ADMIN — APIXABAN 5 MG: 2.5 TABLET, FILM COATED ORAL at 17:57

## 2017-11-04 RX ADMIN — METOPROLOL SUCCINATE 12.5 MG: 25 TABLET, FILM COATED, EXTENDED RELEASE ORAL at 09:41

## 2017-11-04 RX ADMIN — LACTULOSE 30 G: 10 SOLUTION ORAL at 21:48

## 2017-11-04 RX ADMIN — VANCOMYCIN 125 MG: KIT at 00:10

## 2017-11-04 RX ADMIN — APIXABAN 5 MG: 2.5 TABLET, FILM COATED ORAL at 09:41

## 2017-11-04 RX ADMIN — CALCIUM ACETATE MONOHYDRATE AND ALUMINUM SULFATE TETRADECAHYDRATE 1 PACKET: 952; 1347 POWDER, FOR SOLUTION TOPICAL at 13:00

## 2017-11-04 RX ADMIN — METOPROLOL SUCCINATE 12.5 MG: 25 TABLET, FILM COATED, EXTENDED RELEASE ORAL at 21:48

## 2017-11-04 RX ADMIN — VANCOMYCIN 125 MG: KIT at 05:22

## 2017-11-04 NOTE — PLAN OF CARE
Problem: Patient Care Overview (Adult)  Goal: Plan of Care Review  Outcome: Ongoing (interventions implemented as appropriate)    11/04/17 1801   Coping/Psychosocial Response Interventions   Plan Of Care Reviewed With patient   Patient Care Overview   Progress progress toward functional goals as expected   Outcome Evaluation   Outcome Summary/Follow up Plan Cont with excoriated perianal area, frequent stools, [place] soaks and freq pericare given, cont to monitor,safety maintained       Goal: Adult Individualization and Mutuality  Outcome: Ongoing (interventions implemented as appropriate)  Goal: Discharge Needs Assessment  Outcome: Ongoing (interventions implemented as appropriate)    Problem: Confusion, Acute (Adult)  Goal: Cognitive/Functional Impairments Minimized  Outcome: Ongoing (interventions implemented as appropriate)  Goal: Safety  Outcome: Ongoing (interventions implemented as appropriate)    Problem: Fall Risk (Adult)  Goal: Absence of Falls  Outcome: Ongoing (interventions implemented as appropriate)    Problem: Pressure Ulcer Risk (Marco Antonio Scale) (Adult,Obstetrics,Pediatric)  Goal: Skin Integrity  Outcome: Ongoing (interventions implemented as appropriate)    Problem: Liver Failure, Acute/Chronic (Adult)  Goal: Signs and Symptoms of Listed Potential Problems Will be Absent or Manageable (Liver Failure, Acute/Chronic)  Outcome: Ongoing (interventions implemented as appropriate)    Problem: Infection, Risk/Actual (Adult)  Goal: Infection Prevention/Resolution  Outcome: Ongoing (interventions implemented as appropriate)

## 2017-11-04 NOTE — PLAN OF CARE
Problem: Patient Care Overview (Adult)  Goal: Plan of Care Review  Outcome: Ongoing (interventions implemented as appropriate)    11/04/17 0228   Coping/Psychosocial Response Interventions   Plan Of Care Reviewed With patient   Patient Care Overview   Progress no change   Outcome Evaluation   Outcome Summary/Follow up Plan Groin and zena area buttocks excoriated with shearing. [place] orderedand applied. C-diff percautions, oral vancomyacin. oriented to self only. Cont to monitor, safety maintained./         Problem: Confusion, Acute (Adult)  Goal: Cognitive/Functional Impairments Minimized  Outcome: Ongoing (interventions implemented as appropriate)  Goal: Safety  Outcome: Ongoing (interventions implemented as appropriate)    Problem: Fall Risk (Adult)  Goal: Absence of Falls  Outcome: Ongoing (interventions implemented as appropriate)    Problem: Pressure Ulcer Risk (Marco Antonio Scale) (Adult,Obstetrics,Pediatric)  Goal: Skin Integrity  Outcome: Ongoing (interventions implemented as appropriate)    Problem: Liver Failure, Acute/Chronic (Adult)  Goal: Signs and Symptoms of Listed Potential Problems Will be Absent or Manageable (Liver Failure, Acute/Chronic)  Outcome: Ongoing (interventions implemented as appropriate)    Problem: Infection, Risk/Actual (Adult)  Goal: Infection Prevention/Resolution  Outcome: Ongoing (interventions implemented as appropriate)

## 2017-11-04 NOTE — PROGRESS NOTES
"  Infectious Diseases Progress Note    Andrew Sandhu MD     [facility]  Los: 9 days  Patient Identification:  Name: Dorothy A [facility]  Age: 81 y.o.  Sex: female  :  1935  MRN: [id number]         Primary Care Physician: Charly Ryder MD            Subjective: feeling better and slightly stronger. Diarrhea is better  Interval History: See consultation note    Objective:    Scheduled Meds:    aluminum sulfate-calcium acetate 1 packet Topical Q8H   apixaban 5 mg Oral BID   lactulose 30 g Oral TID   latanoprost 1 drop Both Eyes Nightly   metoprolol succinate XL 12.5 mg Oral Q12H   multivitamin with minerals 1 tablet Oral Daily   pantoprazole 40 mg Oral QAM   rifAXIMin 550 mg Oral Q12H   vancomycin 125 mg Oral Q6H     Continuous Infusions:     Vital signs in last 24 hours:  Temp:  [98 °F (36.7 °C)-99.2 °F (37.3 °C)] 98.1 °F (36.7 °C)  Heart Rate:  [61-91] 77  Resp:  [16-18] 18  BP: (128-165)/(51-73) 140/61    Intake/Output:    Intake/Output Summary (Last 24 hours) at 17 0647  Last data filed at 17 1648   Gross per 24 hour   Intake                0 ml   Output               75 ml   Net              -75 ml       Exam:  /61 (BP Location: Left arm, Patient Position: Lying)  Pulse 77  Temp 98.1 °F (36.7 °C) (Oral)   Resp 18  Ht 62\" (157.5 cm)  Wt 193 lb 11.2 oz (87.9 kg)  SpO2 100%  BMI 35.43 kg/m2    General Appearance:    Awake and interactive chronically ill but does not appear toxic                          Head:    Normocephalic, without obvious abnormality, atraumatic                           Eyes:    PERRL, conjunctiva/corneas clear, EOM's intact, both eyes                         Throat:   Lips, tongue, gums normal; oral mucosa pink and moist                           Neck:   Supple, symmetrical, trachea midline, no JVD                         Lungs:    Clear to auscultation bilaterally, respirations unlabored                 Chest Wall:    No tenderness or deformity       "                    Heart:    Regular rate and rhythm, S1 and S2 normal, no murmur,no  Rub                                      or gallop                  Abdomen:     No significant tenderness in the left lower quadrant.                 Extremities:   Extremities normal, atraumatic, no cyanosis or edema                        Pulses:   Pulses palpable in all extremities                            Skin:   Skin is warm and dry,  no rashes or palpable lesions              Data Review:    I reviewed the patient's new clinical results.    Results from last 7 days  Lab Units 11/04/17 0403 11/03/17 0407 11/02/17  0424 11/01/17  0425 10/31/17  0413 10/30/17  0615 10/29/17  0450   WBC 10*3/mm3 11.43* 14.23* 17.14* 13.06* 11.00* 10.21 13.12*   HEMOGLOBIN g/dL 10.0* 10.1* 10.3* 10.5* 10.6* 11.2* 10.4*   PLATELETS 10*3/mm3 165 140 150 145 132* 110* 107*       Results from last 7 days  Lab Units 11/04/17 0403 11/03/17 0407 11/02/17  0424 11/01/17  0425 10/31/17  1200 10/31/17  0413 10/30/17  0615 10/29/17  0450   SODIUM mmol/L 139 139 138 135*  --  136 139 143   POTASSIUM mmol/L 3.7 3.5 3.8 3.9 3.7 3.4* 3.6 3.4*   CHLORIDE mmol/L 101 102 100 97*  --  99 100 106   CO2 mmol/L 28.5 26.5 28.1 25.2  --  26.6 28.6 25.0   BUN mg/dL 17 18 23 22  --  23 24* 22   CREATININE mg/dL 0.90 0.86 1.04* 0.92  --  0.90 1.13* 1.17*   CALCIUM mg/dL 9.4 9.2 9.1 9.1  --  8.9 9.6 9.8   GLUCOSE mg/dL 122* 126* 113* 91  --  100* 141* 109*       Microbiology Results (last 10 days)     Procedure Component Value - Date/Time    Clostridium Difficile Toxin - Stool, Per Rectum [[id number]] Collected:  11/03/17 1004    Lab Status:  Final result Specimen:  Stool from Per Rectum Updated:  11/03/17 9783    Narrative:       The following orders were created for panel order Clostridium Difficile Toxin - Stool, Per Rectum.  Procedure                               Abnormality         Status                     ---------                               -----------          ------                     Clostridium Difficile To...[[id number]]  Abnormal            Final result                 Please view results for these tests on the individual orders.    Clostridium Difficile Toxin, PCR - Stool, Per Rectum [[id number]]  (Abnormal) Collected:  11/03/17 1004    Lab Status:  Final result Specimen:  Stool from Per Rectum Updated:  11/03/17 1229     C. Difficile Toxins by PCR Positive (A)    Respiratory Panel, PCR - Swab, Nasopharynx [[id number]]  (Normal) Collected:  11/02/17 1053    Lab Status:  Final result Specimen:  Swab from Nasopharynx Updated:  11/02/17 1321     ADENOVIRUS, PCR Not Detected     Coronavirus 229E Not Detected     Coronavirus HKU1 Not Detected     Coronavirus NL63 Not Detected     Coronavirus OC43 Not Detected     Human Metapneumovirus Not Detected     Human Rhinovirus/Enterovirus Not Detected     Influenza B PCR Not Detected     Parainfluenza Virus 1 Not Detected     Parainfluenza Virus 2 Not Detected     Parainfluenza Virus 3 Not Detected     Parainfluenza Virus 4 Not Detected     Bordetella pertussis pcr Not Detected     Influenza 2009 H1N1 by PCR Not Detected     Chlamydophila pneumoniae PCR Not Detected     Mycoplasma pneumo by PCR Not Detected     Influenza A PCR Not Detected     Influenza A H3 Not Detected     Influenza A H1 Not Detected     RSV, PCR Not Detected    Blood Culture - Blood, [[id number]]  (Normal) Collected:  11/02/17 0928    Lab Status:  Preliminary result Specimen:  Blood from Hand, Right Updated:  11/03/17 1001     Blood Culture No growth at 24 hours    Blood Culture - Blood, [[id number]]  (Normal) Collected:  11/02/17 0918    Lab Status:  Preliminary result Specimen:  Blood from Arm, Left Updated:  11/03/17 1001     Blood Culture No growth at 24 hours    Urine Culture - Urine, Urine, Clean Catch [[id number]]  (Abnormal)  (Susceptibility) Collected:  10/26/17 1225    Lab Status:  Final result Specimen:  Urine from Urine, Clean Catch Updated:   10/28/17 0953     Urine Culture --      >100,000 CFU/mL Escherichia coli (A)    Susceptibility      Escherichia coli     [place]     Amikacin <=2 ug/ml Susceptible     Ampicillin >=32 ug/ml Resistant     Ampicillin + Sulbactam >=32 ug/ml Resistant     Cefazolin >=64 ug/ml Resistant     Cefepime 2 ug/ml Susceptible     Ceftriaxone >=64 ug/ml Resistant     Ciprofloxacin >=4 ug/ml Resistant     Ertapenem <=0.5 ug/ml Susceptible     Gentamicin >=16 ug/ml Resistant     Levofloxacin >=8 ug/ml Resistant     Nitrofurantoin <=16 ug/ml Susceptible     Piperacillin + Tazobactam <=4 ug/ml Susceptible     Tetracycline >=16 ug/ml Resistant     Trimethoprim + Sulfamethoxazole >=320 ug/ml Resistant                        CT scan of the abdomen and pelvis reviewed  Assessment:  Principal Problem:    Hepatic encephalopathy  Active Problems:    Cirrhosis of liver    Atrial fibrillation    UTI (urinary tract infection)    Hypertension    DNR (do not resuscitate)    Leukocytosis    Long term current use of anticoagulant    Alcoholic cirrhosis of liver    C. difficile colitis      Plan:  contniue treatment of C.diff and avoid broad spectrum antibiotics.  We'll discuss with Dr. Will.  Andrew Sandhu MD  11/4/2017  6:47 AM    Much of this encounter note is an electronic transcription/translation of spoken language to printed text. The electronic translation of spoken language may permit erroneous, or at times, nonsensical words or phrases to be inadvertently transcribed; Although I have reviewed the note for such errors, some may still exist

## 2017-11-05 VITALS
WEIGHT: 195.1 LBS | SYSTOLIC BLOOD PRESSURE: 124 MMHG | HEIGHT: 62 IN | HEART RATE: 59 BPM | DIASTOLIC BLOOD PRESSURE: 43 MMHG | RESPIRATION RATE: 18 BRPM | OXYGEN SATURATION: 100 % | BODY MASS INDEX: 35.9 KG/M2 | TEMPERATURE: 98.1 F

## 2017-11-05 LAB
ALBUMIN SERPL-MCNC: 2.6 G/DL (ref 3.5–5.2)
ALBUMIN/GLOB SERPL: 0.7 G/DL
ALP SERPL-CCNC: 162 U/L (ref 39–117)
ALT SERPL W P-5'-P-CCNC: 19 U/L (ref 1–33)
ANION GAP SERPL CALCULATED.3IONS-SCNC: 10 MMOL/L
AST SERPL-CCNC: 42 U/L (ref 1–32)
BASOPHILS # BLD AUTO: 0.07 10*3/MM3 (ref 0–0.2)
BASOPHILS NFR BLD AUTO: 0.7 % (ref 0–1.5)
BILIRUB SERPL-MCNC: 0.8 MG/DL (ref 0.1–1.2)
BUN BLD-MCNC: 17 MG/DL (ref 8–23)
BUN/CREAT SERPL: 21.5 (ref 7–25)
CALCIUM SPEC-SCNC: 9 MG/DL (ref 8.6–10.5)
CHLORIDE SERPL-SCNC: 101 MMOL/L (ref 98–107)
CO2 SERPL-SCNC: 30 MMOL/L (ref 22–29)
CREAT BLD-MCNC: 0.79 MG/DL (ref 0.57–1)
DEPRECATED RDW RBC AUTO: 53.2 FL (ref 37–54)
EOSINOPHIL # BLD AUTO: 0.67 10*3/MM3 (ref 0–0.7)
EOSINOPHIL NFR BLD AUTO: 6.4 % (ref 0.3–6.2)
ERYTHROCYTE [DISTWIDTH] IN BLOOD BY AUTOMATED COUNT: 13.8 % (ref 11.7–13)
GFR SERPL CREATININE-BSD FRML MDRD: 70 ML/MIN/1.73
GLOBULIN UR ELPH-MCNC: 3.7 GM/DL
GLUCOSE BLD-MCNC: 94 MG/DL (ref 65–99)
HCT VFR BLD AUTO: 32.1 % (ref 35.6–45.5)
HGB BLD-MCNC: 10.4 G/DL (ref 11.9–15.5)
IMM GRANULOCYTES # BLD: 0.19 10*3/MM3 (ref 0–0.03)
IMM GRANULOCYTES NFR BLD: 1.8 % (ref 0–0.5)
LYMPHOCYTES # BLD AUTO: 3.28 10*3/MM3 (ref 0.9–4.8)
LYMPHOCYTES NFR BLD AUTO: 31.4 % (ref 19.6–45.3)
MCH RBC QN AUTO: 34.9 PG (ref 26.9–32)
MCHC RBC AUTO-ENTMCNC: 32.4 G/DL (ref 32.4–36.3)
MCV RBC AUTO: 107.7 FL (ref 80.5–98.2)
MONOCYTES # BLD AUTO: 1.76 10*3/MM3 (ref 0.2–1.2)
MONOCYTES NFR BLD AUTO: 16.8 % (ref 5–12)
NEUTROPHILS # BLD AUTO: 4.48 10*3/MM3 (ref 1.9–8.1)
NEUTROPHILS NFR BLD AUTO: 42.9 % (ref 42.7–76)
PLATELET # BLD AUTO: 169 10*3/MM3 (ref 140–500)
PMV BLD AUTO: 10.7 FL (ref 6–12)
POTASSIUM BLD-SCNC: 3.8 MMOL/L (ref 3.5–5.2)
PROT SERPL-MCNC: 6.3 G/DL (ref 6–8.5)
RBC # BLD AUTO: 2.98 10*6/MM3 (ref 3.9–5.2)
SODIUM BLD-SCNC: 141 MMOL/L (ref 136–145)
WBC NRBC COR # BLD: 10.45 10*3/MM3 (ref 4.5–10.7)

## 2017-11-05 PROCEDURE — 80053 COMPREHEN METABOLIC PANEL: CPT | Performed by: HOSPITALIST

## 2017-11-05 PROCEDURE — 85025 COMPLETE CBC W/AUTO DIFF WBC: CPT | Performed by: HOSPITALIST

## 2017-11-05 RX ORDER — CALCIUM ACETATE MONOHYDRATE AND ALUMINUM SULFATE TETRADECAHYDRATE 952; 1347 MG/2299MG; MG/2299MG
1 POWDER, FOR SOLUTION TOPICAL EVERY 8 HOURS SCHEDULED
Refills: 12
Start: 2017-11-05 | End: 2019-01-04

## 2017-11-05 RX ORDER — LACTULOSE 10 G/15ML
30 SOLUTION ORAL 3 TIMES DAILY
Start: 2017-11-05 | End: 2019-01-09 | Stop reason: HOSPADM

## 2017-11-05 RX ADMIN — LACTULOSE 30 G: 10 SOLUTION ORAL at 15:15

## 2017-11-05 RX ADMIN — VANCOMYCIN 125 MG: KIT at 11:58

## 2017-11-05 RX ADMIN — PANTOPRAZOLE SODIUM 40 MG: 40 TABLET, DELAYED RELEASE ORAL at 06:14

## 2017-11-05 RX ADMIN — VANCOMYCIN 125 MG: KIT at 00:07

## 2017-11-05 RX ADMIN — APIXABAN 5 MG: 2.5 TABLET, FILM COATED ORAL at 08:36

## 2017-11-05 RX ADMIN — VANCOMYCIN 125 MG: KIT at 16:40

## 2017-11-05 RX ADMIN — VANCOMYCIN 125 MG: KIT at 06:14

## 2017-11-05 RX ADMIN — MULTIPLE VITAMINS W/ MINERALS TAB 1 TABLET: TAB at 08:37

## 2017-11-05 RX ADMIN — CALCIUM ACETATE MONOHYDRATE AND ALUMINUM SULFATE TETRADECAHYDRATE 1 PACKET: 952; 1347 POWDER, FOR SOLUTION TOPICAL at 06:14

## 2017-11-05 RX ADMIN — METOPROLOL SUCCINATE 12.5 MG: 25 TABLET, FILM COATED, EXTENDED RELEASE ORAL at 08:37

## 2017-11-05 RX ADMIN — RIFAXIMIN 550 MG: 550 TABLET ORAL at 08:37

## 2017-11-05 RX ADMIN — CALCIUM ACETATE MONOHYDRATE AND ALUMINUM SULFATE TETRADECAHYDRATE 1 PACKET: 952; 1347 POWDER, FOR SOLUTION TOPICAL at 15:15

## 2017-11-05 RX ADMIN — LACTULOSE 30 G: 10 SOLUTION ORAL at 08:36

## 2017-11-05 RX ADMIN — APIXABAN 5 MG: 2.5 TABLET, FILM COATED ORAL at 16:41

## 2017-11-05 NOTE — PROGRESS NOTES
[facility]     LOS: 10 days     Name: Dorothy A [facility]  Age: 81 y.o.  Sex: female  :  1935  MRN: [id number]         Primary Care Physician: Charly Ryder MD    Diet Dysphagia; IV - Mechanical Soft No Mixed Consistencies; Thin    Subjective   Patient reports no chest pain, shortness of breath, nausea, abdominal pain.    Temp:  [97.8 °F (36.6 °C)-98.6 °F (37 °C)] 97.8 °F (36.6 °C)  Heart Rate:  [55-61] 55  Resp:  [18-20] 18  BP: (104-155)/(44-56) 123/56  SpO2:  [89 %-97 %] 90 %  on   ;   O2 Device: room air  Body mass index is 35.68 kg/(m^2).    Physical Exam   Constitutional: No distress.   Cardiovascular: Normal rate and regular rhythm.    Pulmonary/Chest: Effort normal and breath sounds normal. No respiratory distress.   Abdominal: Soft. There is no tenderness. There is no rebound and no guarding.   Musculoskeletal: She exhibits edema (chronic changes).   Neurological: She is alert.   Oriented to hospital   Skin: Skin is warm and dry.     Reviewed medications and new clinical results    aluminum sulfate-calcium acetate 1 packet Topical Q8H   apixaban 5 mg Oral BID   lactulose 30 g Oral TID   latanoprost 1 drop Both Eyes Nightly   metoprolol succinate XL 12.5 mg Oral Q12H   multivitamin with minerals 1 tablet Oral Daily   pantoprazole 40 mg Oral QAM   rifAXIMin 550 mg Oral Q12H   vancomycin 125 mg Oral Q6H        Results from last 7 days  Lab Units 17  0443 17  0403 17  0407 17  0424 17  0425 10/31/17  0413 10/30/17  0615   WBC 10*3/mm3 10.45 11.43* 14.23* 17.14* 13.06* 11.00* 10.21   HEMOGLOBIN g/dL 10.4* 10.0* 10.1* 10.3* 10.5* 10.6* 11.2*   PLATELETS 10*3/mm3 169 165 140 150 145 132* 110*       Results from last 7 days  Lab Units 17  0443 17  0403 17  0407 17  0424 17  0425 10/31/17  1200 10/31/17  0413 10/30/17  0615   SODIUM mmol/L 141 139 139 138 135*  --  136 139   POTASSIUM mmol/L 3.8  3.7 3.5 3.8 3.9 3.7 3.4* 3.6   CHLORIDE mmol/L 101 101 102 100 97*  --  99 100   CO2 mmol/L 30.0* 28.5 26.5 28.1 25.2  --  26.6 28.6   BUN mg/dL 17 17 18 23 22  --  23 24*   CREATININE mg/dL 0.79 0.90 0.86 1.04* 0.92  --  0.90 1.13*   CALCIUM mg/dL 9.0 9.4 9.2 9.1 9.1  --  8.9 9.6   GLUCOSE mg/dL 94 122* 126* 113* 91  --  100* 141*     Estimated Creatinine Clearance: 57 mL/min (by C-G formula based on Cr of 0.79).    Assessment/Plan   Active Hospital Problems (** Indicates Principal Problem)    Diagnosis Date Noted   • **Hepatic encephalopathy [K72.90] 10/26/2017   • C. difficile colitis [A04.72] 11/03/2017   • Leukocytosis [D72.829] 11/02/2017   • Long term current use of anticoagulant [Z79.01] 11/02/2017   • Alcoholic cirrhosis of liver [K70.30] 11/02/2017   • DNR (do not resuscitate) [Z66] 10/27/2017   • Cirrhosis of liver [K74.60] 10/26/2017   • Atrial fibrillation [I48.91] 10/26/2017   • UTI (urinary tract infection) [N39.0] 10/26/2017   • Hypertension [I10] 10/26/2017      Resolved Hospital Problems    Diagnosis Date Noted Date Resolved   No resolved problems to display.     · Encephalopathy improved/stable  · Vanc PO for c.diff. Leukocytosis resolved. 2 weeks total treatment.   · 11/3 labs: UA no blood, occult blood negative  · On eliquis for afib  · [facility]    Micah Dain Will MD   11/05/17  9:50 AM

## 2017-11-05 NOTE — PLAN OF CARE
Problem: Patient Care Overview (Adult)  Goal: Plan of Care Review  Outcome: Ongoing (interventions implemented as appropriate)    11/05/17 0635   Coping/Psychosocial Response Interventions   Plan Of Care Reviewed With patient   Patient Care Overview   Progress improving   Outcome Evaluation   Outcome Summary/Follow up Plan VSS. MORE COOPERATIVE, LESS CONFUSION. SKIN IMPROVING WITH DOMEBORO SOAKS. SAFETY MAINTAINED.         Problem: Confusion, Acute (Adult)  Goal: Cognitive/Functional Impairments Minimized  Outcome: Ongoing (interventions implemented as appropriate)    Problem: Fall Risk (Adult)  Goal: Absence of Falls  Outcome: Ongoing (interventions implemented as appropriate)    Problem: Pressure Ulcer Risk (Marco Antonio Scale) (Adult,Obstetrics,Pediatric)  Goal: Skin Integrity  Outcome: Ongoing (interventions implemented as appropriate)    Problem: Liver Failure, Acute/Chronic (Adult)  Goal: Signs and Symptoms of Listed Potential Problems Will be Absent or Manageable (Liver Failure, Acute/Chronic)  Outcome: Ongoing (interventions implemented as appropriate)    Problem: Infection, Risk/Actual (Adult)  Goal: Infection Prevention/Resolution  Outcome: Ongoing (interventions implemented as appropriate)

## 2017-11-05 NOTE — DISCHARGE SUMMARY
[facility]    Date of Discharge:  11/5/2017    PCP: Charly Ryder MD    Discharge Diagnosis:   Active Hospital Problems (** Indicates Principal Problem)    Diagnosis Date Noted   • **Hepatic encephalopathy [K72.90] 10/26/2017   • C. difficile colitis [A04.72] 11/03/2017   • Leukocytosis [D72.829] 11/02/2017   • Long term current use of anticoagulant [Z79.01] 11/02/2017   • Alcoholic cirrhosis of liver [K70.30] 11/02/2017   • DNR (do not resuscitate) [Z66] 10/27/2017   • Cirrhosis of liver [K74.60] 10/26/2017   • Atrial fibrillation [I48.91] 10/26/2017   • UTI (urinary tract infection) [N39.0] 10/26/2017   • Hypertension [I10] 10/26/2017      Resolved Hospital Problems    Diagnosis Date Noted Date Resolved   No resolved problems to display.      Consulting physicians      Hospital Course  Please see history and physical for details. Patient is a 81 y.o. female with complicated PMH including dementia, Afib and previous stroke on eliquis, previous intracerebral hemorrhage. She also has history of alcoholic cirrhosis. She presents with altered mental status and increased confusion. No fevers. Came to [facility] ER from SNF. Neurology saw the patient and they felt it was acute encephalopathy likely secondary to combination of high ammonia level (138) and multi-drug-resistant Escherichia coli urinary tract and action in the setting of dementia. They felt it was less likely a stroke. Gastroenterology saw the patient. She was placed on rifaximin and lactulose. There was some concern that she was possibly still drinking as outlined in notes this year at [facility]. Confusion improved after treatment. She still has some confusion but suspect that this is close to patient's baseline given her history of dementia. Gastroenterology recommended that she follow-up with Dr. Andino at [facility]. Patient also developed C. difficile colitis with worsening leukocytosis. She was treated  with by mouth vancomycin and leukocytosis has resolved. Her diarrhea has improved.     Radiologist suspected mild acute sigmoid diverticulitis on CT scan. White blood cell count improved after stopping antibiotics so infectious diseases felt that the reading of diverticulitis on CT scan was not of any significance.    Plan is for her to go to [facility]. ID recommends a total of 2 weeks of treatment.    Condition on Discharge: Improved. Patient without complaint.    Temp:  [97.8 °F (36.6 °C)-98.6 °F (37 °C)] 97.8 °F (36.6 °C)  Heart Rate:  [55-61] 55  Resp:  [18-20] 18  BP: (104-155)/(44-56) 123/56  Body mass index is 35.68 kg/(m^2).    Physical Exam   Constitutional: No distress.   Cardiovascular: Normal rate and regular rhythm.    Pulmonary/Chest: Effort normal and breath sounds normal. No respiratory distress.   Abdominal: Soft. There is no tenderness. There is no rebound and no guarding.   Musculoskeletal: She exhibits edema (chronic changes).   Neurological: She is alert.   Oriented to hospital   Skin: Skin is warm and dry.     Discharge Medications   [facility], Dorothy A   Home Medication Instructions RUBEN:[id number]    Printed on:11/05/17 1019   Medication Information                      aluminum sulfate-calcium acetate (DOMEBORO) packet  Apply 1 packet topically Every 8 (Eight) Hours.             apixaban (ELIQUIS) 5 MG tablet tablet  Take 5 mg by mouth 2 (Two) Times a Day.             Cholecalciferol (VITAMIN D3) 56441 units capsule  Take 50,000 Units by mouth Every 7 (Seven) Days.             lactulose (CHRONULAC) 10 GM/15ML solution  Take 45 mL by mouth 3 (Three) Times a Day.             latanoprost (XALATAN) 0.005 % ophthalmic solution  Administer 1 drop to both eyes Every Night.             metoprolol succinate XL (TOPROL-XL) 25 MG 24 hr tablet  Take 12.5 mg by mouth Every 12 (Twelve) Hours.             Multiple Vitamins-Minerals (MULTIVITAMIN ADULT PO)  Take 1 capsule by mouth Daily.              omeprazole (priLOSEC) 20 MG capsule  Take 20 mg by mouth Daily.             rifaximin (XIFAXAN) 550 MG tablet  Take 1 tablet by mouth Every 12 (Twelve) Hours.             vancomycin 50 MG/ML solution oral solution  Take 2.5 mL by mouth Every 6 (Six) Hours for 12 days. Indications: Clostridium Difficile Infection                Diet Instructions     Diet: Regular       Discharge Diet:  Regular                Activity Instructions     Activity as Tolerated                    Additional Instructions for the Follow-ups that You Need to Schedule     Call MD for problems / concerns.    As directed              Follow-up Information     Follow up with Charly Ryder MD .    Specialty:  Internal Medicine    Contact information:    [address]  [place] [id number] [phone]          Follow up with [facility] .    Specialties:  Skilled Nursing Facility, Intermediate Care Facility, Hospice    Contact information:    [address] [address] [phone]        Follow up with Reinaldo Andino MD .    Specialty:  Gastroenterology    Contact information:    [address]  [place] [id number] [phone]          Test Results Pending at Discharge   Order Current Status    Blood Culture - Blood, Preliminary result    Blood Culture - Blood, Preliminary result         Micah Will MD  11/05/17  10:19 AM    Discharge time spent greater than 30 minutes.

## 2017-11-05 NOTE — PROGRESS NOTES
"  Infectious Diseases Progress Note    Andrew Sandhu MD     [facility]  [place]: 10 days  Patient Identification:  Name: Dorothy A Ochsner  Age: 81 y.o.  Sex: female  :  1935  MRN: [id number]         Primary Care Physician: Charly Ryder MD            Subjective: Feeling better  Interval History: See consultation note    Objective:    Scheduled Meds:    aluminum sulfate-calcium acetate 1 packet Topical Q8H   apixaban 5 mg Oral BID   lactulose 30 g Oral TID   latanoprost 1 drop Both Eyes Nightly   metoprolol succinate XL 12.5 mg Oral Q12H   multivitamin with minerals 1 tablet Oral Daily   pantoprazole 40 mg Oral QAM   rifAXIMin 550 mg Oral Q12H   vancomycin 125 mg Oral Q6H     Continuous Infusions:     Vital signs in last 24 hours:  Temp:  [97.8 °F (36.6 °C)-98.6 °F (37 °C)] 97.8 °F (36.6 °C)  Heart Rate:  [55-61] 55  Resp:  [18-20] 18  BP: (104-155)/(44-56) 123/56    Intake/Output:    Intake/Output Summary (Last 24 hours) at 17 0935  Last data filed at 17 0819   Gross per 24 hour   Intake              630 ml   Output                0 ml   Net              630 ml       Exam:  /56 (BP Location: Left arm, Patient Position: Sitting)  Pulse 55  Temp 97.8 °F (36.6 °C) (Oral)   Resp 18  Ht 62\" (157.5 cm)  Wt 195 lb 1.6 oz (88.5 kg)  SpO2 90%  BMI 35.68 kg/m2    General Appearance:    Awake and interactive chronically ill but does not appear toxic                          Head:    Normocephalic, without obvious abnormality, atraumatic                           Eyes:    PERRL, conjunctiva/corneas clear, EOM's intact, both eyes                         Throat:   Lips, tongue, gums normal; oral mucosa pink and moist                           Neck:   Supple, symmetrical, trachea midline, no JVD                         Lungs:    Clear to auscultation bilaterally, respirations unlabored                 Chest Wall:    No tenderness or deformity                          Heart:    Regular " rate and rhythm, S1 and S2 normal, no murmur,no  Rub                                      or gallop                  Abdomen:     No significant tenderness in the left lower quadrant.                 Extremities:   Extremities normal, atraumatic, no cyanosis or edema                        Pulses:   Pulses palpable in all extremities                            Skin:   Skin is warm and dry,  no rashes or palpable lesions              Data Review:    I reviewed the patient's new clinical results.    Results from last 7 days  Lab Units 11/05/17 0443 11/04/17 0403 11/03/17 0407 11/02/17  0424 11/01/17  0425 10/31/17  0413 10/30/17  0615   WBC 10*3/mm3 10.45 11.43* 14.23* 17.14* 13.06* 11.00* 10.21   HEMOGLOBIN g/dL 10.4* 10.0* 10.1* 10.3* 10.5* 10.6* 11.2*   PLATELETS 10*3/mm3 169 165 140 150 145 132* 110*       Results from last 7 days  Lab Units 11/05/17 0443 11/04/17 0403 11/03/17  0407 11/02/17  0424 11/01/17  0425 10/31/17  1200 10/31/17  0413 10/30/17  0615   SODIUM mmol/L 141 139 139 138 135*  --  136 139   POTASSIUM mmol/L 3.8 3.7 3.5 3.8 3.9 3.7 3.4* 3.6   CHLORIDE mmol/L 101 101 102 100 97*  --  99 100   CO2 mmol/L 30.0* 28.5 26.5 28.1 25.2  --  26.6 28.6   BUN mg/dL 17 17 18 23 22  --  23 24*   CREATININE mg/dL 0.79 0.90 0.86 1.04* 0.92  --  0.90 1.13*   CALCIUM mg/dL 9.0 9.4 9.2 9.1 9.1  --  8.9 9.6   GLUCOSE mg/dL 94 122* 126* 113* 91  --  100* 141*       Microbiology Results (last 10 days)     Procedure Component Value - Date/Time    Clostridium Difficile Toxin - Stool, Per Rectum [[id number]] Collected:  11/03/17 1004    Lab Status:  Final result Specimen:  Stool from Per Rectum Updated:  11/03/17 1229    Narrative:       The following orders were created for panel order Clostridium Difficile Toxin - Stool, Per Rectum.  Procedure                               Abnormality         Status                     ---------                               -----------         ------                      Clostridium Difficile To...[[id number]]  Abnormal            Final result                 Please view results for these tests on the individual orders.    Clostridium Difficile Toxin, PCR - Stool, Per Rectum [[id number]]  (Abnormal) Collected:  11/03/17 1004    Lab Status:  Final result Specimen:  Stool from Per Rectum Updated:  11/03/17 1229     C. Difficile Toxins by PCR Positive (A)    Respiratory Panel, PCR - Swab, Nasopharynx [[id number]]  (Normal) Collected:  11/02/17 1053    Lab Status:  Final result Specimen:  Swab from Nasopharynx Updated:  11/02/17 1321     ADENOVIRUS, PCR Not Detected     Coronavirus 229E Not Detected     Coronavirus HKU1 Not Detected     Coronavirus NL63 Not Detected     Coronavirus OC43 Not Detected     Human Metapneumovirus Not Detected     Human Rhinovirus/Enterovirus Not Detected     Influenza B PCR Not Detected     Parainfluenza Virus 1 Not Detected     Parainfluenza Virus 2 Not Detected     Parainfluenza Virus 3 Not Detected     Parainfluenza Virus 4 Not Detected     Bordetella pertussis pcr Not Detected     Influenza 2009 H1N1 by PCR Not Detected     Chlamydophila pneumoniae PCR Not Detected     Mycoplasma pneumo by PCR Not Detected     Influenza A PCR Not Detected     Influenza A H3 Not Detected     Influenza A H1 Not Detected     RSV, PCR Not Detected    Blood Culture - Blood, [[id number]]  (Normal) Collected:  11/02/17 0928    Lab Status:  Preliminary result Specimen:  Blood from Hand, Right Updated:  11/05/17 0901     Blood Culture No growth at 3 days    Blood Culture - Blood, [[id number]]  (Normal) Collected:  11/02/17 0918    Lab Status:  Preliminary result Specimen:  Blood from Arm, Left Updated:  11/05/17 0901     Blood Culture No growth at 3 days    Urine Culture - Urine, Urine, Clean Catch [[id number]]  (Abnormal)  (Susceptibility) Collected:  10/26/17 1225    Lab Status:  Final result Specimen:  Urine from Urine, Clean Catch Updated:  10/28/17 0953     Urine Culture --       >100,000 CFU/mL Escherichia coli (A)    Susceptibility      Escherichia coli     [place]     Amikacin <=2 ug/ml Susceptible     Ampicillin >=32 ug/ml Resistant     Ampicillin + Sulbactam >=32 ug/ml Resistant     Cefazolin >=64 ug/ml Resistant     Cefepime 2 ug/ml Susceptible     Ceftriaxone >=64 ug/ml Resistant     Ciprofloxacin >=4 ug/ml Resistant     Ertapenem <=0.5 ug/ml Susceptible     Gentamicin >=16 ug/ml Resistant     Levofloxacin >=8 ug/ml Resistant     Nitrofurantoin <=16 ug/ml Susceptible     Piperacillin + Tazobactam <=4 ug/ml Susceptible     Tetracycline >=16 ug/ml Resistant     Trimethoprim + Sulfamethoxazole >=320 ug/ml Resistant                        CT scan of the abdomen and pelvis reviewed  Assessment:  Principal Problem:    Hepatic encephalopathy  Active Problems:    Cirrhosis of liver    Atrial fibrillation    UTI (urinary tract infection)    Hypertension    DNR (do not resuscitate)    Leukocytosis    Long term current use of anticoagulant    Alcoholic cirrhosis of liver    C. difficile colitis      Plan:  Continue treatment for C. difficile infection for total of 2 weeks.  Okay to discharge from infectious disease standpoint with instructions to avoid broad-spectrum antibody therapy  Andrew Sandhu MD  11/5/2017  9:35 AM    Much of this encounter note is an electronic transcription/translation of spoken language to printed text. The electronic translation of spoken language may permit erroneous, or at times, nonsensical words or phrases to be inadvertently transcribed; Although I have reviewed the note for such errors, some may still exist

## 2017-11-06 NOTE — PROGRESS NOTES
Case Management Discharge Note    Final Note: DC [facility] via ambulance    Discharge Placement     Facility/Agency Request Status Selected? Address Phone Number Fax Number    [facility] Accepted    Yes [address], [facility] [address] [phone]        Ambulance: [facility]    Discharge Codes: 03  Discharged/transferred to skilled nursing facility (SNF) with Medicare certification in anticipation of skilled care

## 2017-11-07 LAB
BACTERIA SPEC AEROBE CULT: NORMAL
BACTERIA SPEC AEROBE CULT: NORMAL

## 2018-09-09 ENCOUNTER — LAB REQUISITION (OUTPATIENT)
Dept: LAB | Facility: HOSPITAL | Age: 83
End: 2018-09-09

## 2018-09-09 DIAGNOSIS — Z00.00 ROUTINE GENERAL MEDICAL EXAMINATION AT A HEALTH CARE FACILITY: ICD-10-CM

## 2018-09-09 LAB
AMMONIA BLD-SCNC: 136 UMOL/L (ref 11–51)
BACTERIA UR QL AUTO: ABNORMAL /HPF
BILIRUB UR QL STRIP: NEGATIVE
CLARITY UR: CLEAR
COLOR UR: ABNORMAL
GLUCOSE UR STRIP-MCNC: NEGATIVE MG/DL
HGB UR QL STRIP.AUTO: ABNORMAL
HYALINE CASTS UR QL AUTO: ABNORMAL /LPF
KETONES UR QL STRIP: NEGATIVE
LEUKOCYTE ESTERASE UR QL STRIP.AUTO: ABNORMAL
NITRITE UR QL STRIP: POSITIVE
PH UR STRIP.AUTO: 6 [PH] (ref 5–8)
PROT UR QL STRIP: NEGATIVE
RBC # UR: ABNORMAL /HPF
REF LAB TEST METHOD: ABNORMAL
SP GR UR STRIP: 1.02 (ref 1–1.03)
SQUAMOUS #/AREA URNS HPF: ABNORMAL /HPF
TRANS CELLS #/AREA URNS HPF: ABNORMAL /HPF
UROBILINOGEN UR QL STRIP: ABNORMAL
WBC UR QL AUTO: ABNORMAL /HPF

## 2018-09-09 PROCEDURE — 82140 ASSAY OF AMMONIA: CPT

## 2018-09-09 PROCEDURE — 81001 URINALYSIS AUTO W/SCOPE: CPT

## 2018-09-22 ENCOUNTER — LAB REQUISITION (OUTPATIENT)
Dept: LAB | Facility: HOSPITAL | Age: 83
End: 2018-09-22

## 2018-09-22 DIAGNOSIS — Z00.00 ROUTINE GENERAL MEDICAL EXAMINATION AT A HEALTH CARE FACILITY: ICD-10-CM

## 2018-09-22 LAB
ALBUMIN SERPL-MCNC: 3 G/DL (ref 3.5–5.2)
ALBUMIN/GLOB SERPL: 0.9 G/DL
ALP SERPL-CCNC: 119 U/L (ref 39–117)
ALT SERPL W P-5'-P-CCNC: 21 U/L (ref 1–33)
AMMONIA BLD-SCNC: 65 UMOL/L (ref 11–51)
ANION GAP SERPL CALCULATED.3IONS-SCNC: 6.5 MMOL/L
AST SERPL-CCNC: 41 U/L (ref 1–32)
BILIRUB SERPL-MCNC: 1.4 MG/DL (ref 0.1–1.2)
BUN BLD-MCNC: 16 MG/DL (ref 8–23)
BUN/CREAT SERPL: 19.8 (ref 7–25)
CALCIUM SPEC-SCNC: 9.4 MG/DL (ref 8.6–10.5)
CHLORIDE SERPL-SCNC: 104 MMOL/L (ref 98–107)
CO2 SERPL-SCNC: 38.5 MMOL/L (ref 22–29)
CREAT BLD-MCNC: 0.81 MG/DL (ref 0.57–1)
DEPRECATED RDW RBC AUTO: 62.5 FL (ref 37–54)
ERYTHROCYTE [DISTWIDTH] IN BLOOD BY AUTOMATED COUNT: 15.8 % (ref 11.7–13)
GFR SERPL CREATININE-BSD FRML MDRD: 68 ML/MIN/1.73
GLOBULIN UR ELPH-MCNC: 3.4 GM/DL
GLUCOSE BLD-MCNC: 114 MG/DL (ref 65–99)
HCT VFR BLD AUTO: 36 % (ref 35.6–45.5)
HGB BLD-MCNC: 11 G/DL (ref 11.9–15.5)
MCH RBC QN AUTO: 33.6 PG (ref 26.9–32)
MCHC RBC AUTO-ENTMCNC: 30.6 G/DL (ref 32.4–36.3)
MCV RBC AUTO: 110.1 FL (ref 80.5–98.2)
PLATELET # BLD AUTO: 73 10*3/MM3 (ref 140–500)
PMV BLD AUTO: 12.7 FL (ref 6–12)
POTASSIUM BLD-SCNC: 4.3 MMOL/L (ref 3.5–5.2)
PROT SERPL-MCNC: 6.4 G/DL (ref 6–8.5)
RBC # BLD AUTO: 3.27 10*6/MM3 (ref 3.9–5.2)
SODIUM BLD-SCNC: 149 MMOL/L (ref 136–145)
WBC NRBC COR # BLD: 10.64 10*3/MM3 (ref 4.5–10.7)

## 2018-09-22 PROCEDURE — 80053 COMPREHEN METABOLIC PANEL: CPT

## 2018-09-22 PROCEDURE — 82140 ASSAY OF AMMONIA: CPT

## 2018-09-22 PROCEDURE — 85027 COMPLETE CBC AUTOMATED: CPT

## 2018-11-19 PROCEDURE — 80053 COMPREHEN METABOLIC PANEL: CPT

## 2018-11-19 PROCEDURE — 85025 COMPLETE CBC W/AUTO DIFF WBC: CPT

## 2018-11-19 PROCEDURE — 82140 ASSAY OF AMMONIA: CPT

## 2018-11-20 ENCOUNTER — LAB REQUISITION (OUTPATIENT)
Dept: LAB | Facility: HOSPITAL | Age: 83
End: 2018-11-20

## 2018-11-20 DIAGNOSIS — Z00.00 ROUTINE GENERAL MEDICAL EXAMINATION AT A HEALTH CARE FACILITY: ICD-10-CM

## 2018-11-20 LAB
ALBUMIN SERPL-MCNC: 2.5 G/DL (ref 3.5–5.2)
ALBUMIN/GLOB SERPL: 0.7 G/DL
ALP SERPL-CCNC: 134 U/L (ref 39–117)
ALT SERPL W P-5'-P-CCNC: 18 U/L (ref 1–33)
AMMONIA BLD-SCNC: 123 UMOL/L (ref 11–51)
ANION GAP SERPL CALCULATED.3IONS-SCNC: 9.4 MMOL/L
AST SERPL-CCNC: 37 U/L (ref 1–32)
BASOPHILS # BLD AUTO: 0.04 10*3/MM3 (ref 0–0.2)
BASOPHILS NFR BLD AUTO: 0.5 % (ref 0–1.5)
BILIRUB SERPL-MCNC: 0.8 MG/DL (ref 0.1–1.2)
BUN BLD-MCNC: 13 MG/DL (ref 8–23)
BUN/CREAT SERPL: 15.7 (ref 7–25)
CALCIUM SPEC-SCNC: 9.4 MG/DL (ref 8.6–10.5)
CHLORIDE SERPL-SCNC: 103 MMOL/L (ref 98–107)
CO2 SERPL-SCNC: 27.6 MMOL/L (ref 22–29)
CREAT BLD-MCNC: 0.83 MG/DL (ref 0.57–1)
DEPRECATED RDW RBC AUTO: 55.1 FL (ref 37–54)
EOSINOPHIL # BLD AUTO: 0.37 10*3/MM3 (ref 0–0.7)
EOSINOPHIL NFR BLD AUTO: 4.6 % (ref 0.3–6.2)
ERYTHROCYTE [DISTWIDTH] IN BLOOD BY AUTOMATED COUNT: 14.6 % (ref 11.7–13)
GFR SERPL CREATININE-BSD FRML MDRD: 66 ML/MIN/1.73
GLOBULIN UR ELPH-MCNC: 3.8 GM/DL
GLUCOSE BLD-MCNC: 98 MG/DL (ref 65–99)
HCT VFR BLD AUTO: 34.9 % (ref 35.6–45.5)
HGB BLD-MCNC: 11.5 G/DL (ref 11.9–15.5)
IMM GRANULOCYTES # BLD: 0.02 10*3/MM3 (ref 0–0.03)
IMM GRANULOCYTES NFR BLD: 0.3 % (ref 0–0.5)
LYMPHOCYTES # BLD AUTO: 3.3 10*3/MM3 (ref 0.9–4.8)
LYMPHOCYTES NFR BLD AUTO: 41.3 % (ref 19.6–45.3)
MCH RBC QN AUTO: 33.7 PG (ref 26.9–32)
MCHC RBC AUTO-ENTMCNC: 33 G/DL (ref 32.4–36.3)
MCV RBC AUTO: 102.3 FL (ref 80.5–98.2)
MONOCYTES # BLD AUTO: 1.18 10*3/MM3 (ref 0.2–1.2)
MONOCYTES NFR BLD AUTO: 14.8 % (ref 5–12)
NEUTROPHILS # BLD AUTO: 3.1 10*3/MM3 (ref 1.9–8.1)
NEUTROPHILS NFR BLD AUTO: 38.8 % (ref 42.7–76)
PLATELET # BLD AUTO: 89 10*3/MM3 (ref 140–500)
PMV BLD AUTO: 11 FL (ref 6–12)
POTASSIUM BLD-SCNC: 4 MMOL/L (ref 3.5–5.2)
PROT SERPL-MCNC: 6.3 G/DL (ref 6–8.5)
RBC # BLD AUTO: 3.41 10*6/MM3 (ref 3.9–5.2)
SODIUM BLD-SCNC: 140 MMOL/L (ref 136–145)
WBC NRBC COR # BLD: 7.99 10*3/MM3 (ref 4.5–10.7)

## 2019-01-04 ENCOUNTER — APPOINTMENT (OUTPATIENT)
Dept: GENERAL RADIOLOGY | Facility: HOSPITAL | Age: 84
End: 2019-01-04

## 2019-01-04 ENCOUNTER — HOSPITAL ENCOUNTER (INPATIENT)
Facility: HOSPITAL | Age: 84
LOS: 3 days | Discharge: NURSING, REHAB OR LONG-TERM CARE | End: 2019-01-09
Attending: EMERGENCY MEDICINE | Admitting: HOSPITALIST

## 2019-01-04 ENCOUNTER — APPOINTMENT (OUTPATIENT)
Dept: CT IMAGING | Facility: HOSPITAL | Age: 84
End: 2019-01-04

## 2019-01-04 DIAGNOSIS — Z74.09 IMPAIRED FUNCTIONAL MOBILITY, BALANCE, GAIT, AND ENDURANCE: ICD-10-CM

## 2019-01-04 DIAGNOSIS — R41.82 ALTERED MENTAL STATUS, UNSPECIFIED ALTERED MENTAL STATUS TYPE: Primary | ICD-10-CM

## 2019-01-04 DIAGNOSIS — N39.0 URINARY TRACT INFECTION WITHOUT HEMATURIA, SITE UNSPECIFIED: ICD-10-CM

## 2019-01-04 DIAGNOSIS — R13.12 OROPHARYNGEAL DYSPHAGIA: ICD-10-CM

## 2019-01-04 LAB
ALBUMIN SERPL-MCNC: 2.8 G/DL (ref 3.5–5.2)
ALBUMIN/GLOB SERPL: 0.7 G/DL
ALP SERPL-CCNC: 149 U/L (ref 39–117)
ALT SERPL W P-5'-P-CCNC: 14 U/L (ref 1–33)
AMMONIA BLD-SCNC: 125 UMOL/L (ref 11–51)
ANION GAP SERPL CALCULATED.3IONS-SCNC: 9.8 MMOL/L
AST SERPL-CCNC: 31 U/L (ref 1–32)
BACTERIA UR QL AUTO: ABNORMAL /HPF
BASOPHILS # BLD AUTO: 0.03 10*3/MM3 (ref 0–0.2)
BASOPHILS NFR BLD AUTO: 0.4 % (ref 0–1.5)
BILIRUB SERPL-MCNC: 2.5 MG/DL (ref 0.1–1.2)
BILIRUB UR QL STRIP: NEGATIVE
BUN BLD-MCNC: 15 MG/DL (ref 8–23)
BUN/CREAT SERPL: 20.8 (ref 7–25)
CALCIUM SPEC-SCNC: 9.3 MG/DL (ref 8.6–10.5)
CHLORIDE SERPL-SCNC: 104 MMOL/L (ref 98–107)
CLARITY UR: ABNORMAL
CO2 SERPL-SCNC: 24.2 MMOL/L (ref 22–29)
COLOR UR: ABNORMAL
CREAT BLD-MCNC: 0.72 MG/DL (ref 0.57–1)
D-LACTATE SERPL-SCNC: 1.7 MMOL/L (ref 0.5–2)
DEPRECATED RDW RBC AUTO: 53 FL (ref 37–54)
EOSINOPHIL # BLD AUTO: 0.05 10*3/MM3 (ref 0–0.7)
EOSINOPHIL NFR BLD AUTO: 0.6 % (ref 0.3–6.2)
ERYTHROCYTE [DISTWIDTH] IN BLOOD BY AUTOMATED COUNT: 15 % (ref 11.7–13)
FLUAV AG NPH QL: NEGATIVE
FLUBV AG NPH QL IA: NEGATIVE
GFR SERPL CREATININE-BSD FRML MDRD: 77 ML/MIN/1.73
GLOBULIN UR ELPH-MCNC: 4.2 GM/DL
GLUCOSE BLD-MCNC: 109 MG/DL (ref 65–99)
GLUCOSE UR STRIP-MCNC: NEGATIVE MG/DL
HCT VFR BLD AUTO: 39.2 % (ref 35.6–45.5)
HGB BLD-MCNC: 13.6 G/DL (ref 11.9–15.5)
HGB UR QL STRIP.AUTO: ABNORMAL
HOLD SPECIMEN: NORMAL
HYALINE CASTS UR QL AUTO: ABNORMAL /LPF
IMM GRANULOCYTES # BLD AUTO: 0.02 10*3/MM3 (ref 0–0.03)
IMM GRANULOCYTES NFR BLD AUTO: 0.3 % (ref 0–0.5)
KETONES UR QL STRIP: NEGATIVE
LEUKOCYTE ESTERASE UR QL STRIP.AUTO: ABNORMAL
LYMPHOCYTES # BLD AUTO: 1.09 10*3/MM3 (ref 0.9–4.8)
LYMPHOCYTES NFR BLD AUTO: 13.9 % (ref 19.6–45.3)
MCH RBC QN AUTO: 33.4 PG (ref 26.9–32)
MCHC RBC AUTO-ENTMCNC: 34.7 G/DL (ref 32.4–36.3)
MCV RBC AUTO: 96.3 FL (ref 80.5–98.2)
MONOCYTES # BLD AUTO: 0.59 10*3/MM3 (ref 0.2–1.2)
MONOCYTES NFR BLD AUTO: 7.5 % (ref 5–12)
NEUTROPHILS # BLD AUTO: 6.11 10*3/MM3 (ref 1.9–8.1)
NEUTROPHILS NFR BLD AUTO: 77.6 % (ref 42.7–76)
NITRITE UR QL STRIP: POSITIVE
NT-PROBNP SERPL-MCNC: 523.4 PG/ML (ref 0–1800)
PH UR STRIP.AUTO: 6 [PH] (ref 5–8)
PLATELET # BLD AUTO: 143 10*3/MM3 (ref 140–500)
PMV BLD AUTO: 11.5 FL (ref 6–12)
POTASSIUM BLD-SCNC: 4.4 MMOL/L (ref 3.5–5.2)
PROCALCITONIN SERPL-MCNC: 0.07 NG/ML (ref 0.1–0.25)
PROT SERPL-MCNC: 7 G/DL (ref 6–8.5)
PROT UR QL STRIP: NEGATIVE
RBC # BLD AUTO: 4.07 10*6/MM3 (ref 3.9–5.2)
RBC # UR: ABNORMAL /HPF
REF LAB TEST METHOD: ABNORMAL
SODIUM BLD-SCNC: 138 MMOL/L (ref 136–145)
SP GR UR STRIP: 1.02 (ref 1–1.03)
SQUAMOUS #/AREA URNS HPF: ABNORMAL /HPF
TROPONIN T SERPL-MCNC: <0.01 NG/ML (ref 0–0.03)
UROBILINOGEN UR QL STRIP: ABNORMAL
WBC NRBC COR # BLD: 7.87 10*3/MM3 (ref 4.5–10.7)
WBC UR QL AUTO: ABNORMAL /HPF
WHOLE BLOOD HOLD SPECIMEN: NORMAL

## 2019-01-04 PROCEDURE — G0378 HOSPITAL OBSERVATION PER HR: HCPCS

## 2019-01-04 PROCEDURE — 70450 CT HEAD/BRAIN W/O DYE: CPT

## 2019-01-04 PROCEDURE — 81001 URINALYSIS AUTO W/SCOPE: CPT | Performed by: NURSE PRACTITIONER

## 2019-01-04 PROCEDURE — 80053 COMPREHEN METABOLIC PANEL: CPT | Performed by: NURSE PRACTITIONER

## 2019-01-04 PROCEDURE — 84145 PROCALCITONIN (PCT): CPT | Performed by: NURSE PRACTITIONER

## 2019-01-04 PROCEDURE — 25010000002 CEFTRIAXONE PER 250 MG: Performed by: NURSE PRACTITIONER

## 2019-01-04 PROCEDURE — 83605 ASSAY OF LACTIC ACID: CPT | Performed by: NURSE PRACTITIONER

## 2019-01-04 PROCEDURE — 85025 COMPLETE CBC W/AUTO DIFF WBC: CPT | Performed by: NURSE PRACTITIONER

## 2019-01-04 PROCEDURE — 87040 BLOOD CULTURE FOR BACTERIA: CPT | Performed by: NURSE PRACTITIONER

## 2019-01-04 PROCEDURE — 87086 URINE CULTURE/COLONY COUNT: CPT | Performed by: NURSE PRACTITIONER

## 2019-01-04 PROCEDURE — 99285 EMERGENCY DEPT VISIT HI MDM: CPT

## 2019-01-04 PROCEDURE — 87077 CULTURE AEROBIC IDENTIFY: CPT | Performed by: NURSE PRACTITIONER

## 2019-01-04 PROCEDURE — 87186 SC STD MICRODIL/AGAR DIL: CPT | Performed by: NURSE PRACTITIONER

## 2019-01-04 PROCEDURE — 84484 ASSAY OF TROPONIN QUANT: CPT | Performed by: NURSE PRACTITIONER

## 2019-01-04 PROCEDURE — 93010 ELECTROCARDIOGRAM REPORT: CPT | Performed by: INTERNAL MEDICINE

## 2019-01-04 PROCEDURE — 93005 ELECTROCARDIOGRAM TRACING: CPT | Performed by: NURSE PRACTITIONER

## 2019-01-04 PROCEDURE — 82140 ASSAY OF AMMONIA: CPT | Performed by: HOSPITALIST

## 2019-01-04 PROCEDURE — 87804 INFLUENZA ASSAY W/OPTIC: CPT | Performed by: NURSE PRACTITIONER

## 2019-01-04 PROCEDURE — 83880 ASSAY OF NATRIURETIC PEPTIDE: CPT | Performed by: NURSE PRACTITIONER

## 2019-01-04 PROCEDURE — P9612 CATHETERIZE FOR URINE SPEC: HCPCS

## 2019-01-04 PROCEDURE — 71045 X-RAY EXAM CHEST 1 VIEW: CPT

## 2019-01-04 RX ORDER — POTASSIUM CHLORIDE 750 MG/1
10 CAPSULE, EXTENDED RELEASE ORAL
COMMUNITY
End: 2019-01-09 | Stop reason: HOSPADM

## 2019-01-04 RX ORDER — ONDANSETRON 4 MG/1
4 TABLET, ORALLY DISINTEGRATING ORAL EVERY 6 HOURS PRN
Status: DISCONTINUED | OUTPATIENT
Start: 2019-01-04 | End: 2019-01-09 | Stop reason: HOSPADM

## 2019-01-04 RX ORDER — CEFTRIAXONE SODIUM 1 G/50ML
1 INJECTION, SOLUTION INTRAVENOUS ONCE
Status: COMPLETED | OUTPATIENT
Start: 2019-01-04 | End: 2019-01-04

## 2019-01-04 RX ORDER — FUROSEMIDE 40 MG/1
40 TABLET ORAL EVERY OTHER DAY
COMMUNITY

## 2019-01-04 RX ORDER — CEFTRIAXONE SODIUM 1 G/50ML
1 INJECTION, SOLUTION INTRAVENOUS EVERY 24 HOURS
Status: DISCONTINUED | OUTPATIENT
Start: 2019-01-05 | End: 2019-01-07

## 2019-01-04 RX ORDER — LACTOBACILLUS ACIDOPHILUS / LACTOBACILLUS BULGARICUS 100 MILLION CFU STRENGTH
GRANULES ORAL DAILY
COMMUNITY

## 2019-01-04 RX ORDER — FERROUS SULFATE 325(65) MG
325 TABLET ORAL
COMMUNITY

## 2019-01-04 RX ORDER — ACETAMINOPHEN 160 MG/5ML
650 SOLUTION ORAL EVERY 4 HOURS PRN
Status: DISCONTINUED | OUTPATIENT
Start: 2019-01-04 | End: 2019-01-09 | Stop reason: HOSPADM

## 2019-01-04 RX ORDER — LATANOPROST 50 UG/ML
1 SOLUTION/ DROPS OPHTHALMIC NIGHTLY
Status: DISCONTINUED | OUTPATIENT
Start: 2019-01-04 | End: 2019-01-09 | Stop reason: HOSPADM

## 2019-01-04 RX ORDER — L.ACID,PARA/B.BIFIDUM/S.THERM 8B CELL
1 CAPSULE ORAL DAILY
Status: DISCONTINUED | OUTPATIENT
Start: 2019-01-04 | End: 2019-01-09 | Stop reason: HOSPADM

## 2019-01-04 RX ORDER — ACETAMINOPHEN 325 MG/1
650 TABLET ORAL EVERY 4 HOURS PRN
Status: DISCONTINUED | OUTPATIENT
Start: 2019-01-04 | End: 2019-01-09 | Stop reason: HOSPADM

## 2019-01-04 RX ORDER — ACETAMINOPHEN 650 MG/1
650 SUPPOSITORY RECTAL EVERY 4 HOURS PRN
Status: DISCONTINUED | OUTPATIENT
Start: 2019-01-04 | End: 2019-01-09 | Stop reason: HOSPADM

## 2019-01-04 RX ORDER — LACTULOSE 10 G/15ML
30 SOLUTION ORAL 3 TIMES DAILY
Status: DISCONTINUED | OUTPATIENT
Start: 2019-01-04 | End: 2019-01-07

## 2019-01-04 RX ORDER — SODIUM CHLORIDE 0.9 % (FLUSH) 0.9 %
3-10 SYRINGE (ML) INJECTION AS NEEDED
Status: DISCONTINUED | OUTPATIENT
Start: 2019-01-04 | End: 2019-01-09 | Stop reason: HOSPADM

## 2019-01-04 RX ORDER — ONDANSETRON 2 MG/ML
4 INJECTION INTRAMUSCULAR; INTRAVENOUS EVERY 6 HOURS PRN
Status: DISCONTINUED | OUTPATIENT
Start: 2019-01-04 | End: 2019-01-09 | Stop reason: HOSPADM

## 2019-01-04 RX ORDER — SODIUM CHLORIDE 9 MG/ML
75 INJECTION, SOLUTION INTRAVENOUS CONTINUOUS
Status: DISCONTINUED | OUTPATIENT
Start: 2019-01-04 | End: 2019-01-07

## 2019-01-04 RX ORDER — SODIUM CHLORIDE 0.9 % (FLUSH) 0.9 %
3 SYRINGE (ML) INJECTION EVERY 12 HOURS SCHEDULED
Status: DISCONTINUED | OUTPATIENT
Start: 2019-01-04 | End: 2019-01-09 | Stop reason: HOSPADM

## 2019-01-04 RX ORDER — METOPROLOL SUCCINATE 25 MG/1
12.5 TABLET, EXTENDED RELEASE ORAL EVERY 12 HOURS
Status: DISCONTINUED | OUTPATIENT
Start: 2019-01-04 | End: 2019-01-09 | Stop reason: HOSPADM

## 2019-01-04 RX ORDER — ONDANSETRON 4 MG/1
4 TABLET, FILM COATED ORAL EVERY 6 HOURS PRN
Status: DISCONTINUED | OUTPATIENT
Start: 2019-01-04 | End: 2019-01-09 | Stop reason: HOSPADM

## 2019-01-04 RX ADMIN — METOPROLOL SUCCINATE 12.5 MG: 25 TABLET, FILM COATED, EXTENDED RELEASE ORAL at 20:38

## 2019-01-04 RX ADMIN — CEFTRIAXONE SODIUM 1 G: 1 INJECTION, SOLUTION INTRAVENOUS at 10:55

## 2019-01-04 RX ADMIN — SODIUM CHLORIDE 75 ML/HR: 9 INJECTION, SOLUTION INTRAVENOUS at 17:16

## 2019-01-04 RX ADMIN — APIXABAN 2.5 MG: 2.5 TABLET, FILM COATED ORAL at 20:38

## 2019-01-04 RX ADMIN — LATANOPROST 1 DROP: 50 SOLUTION/ DROPS OPHTHALMIC at 20:36

## 2019-01-04 NOTE — ED TRIAGE NOTES
Patient to er per ems from nursing home with c/o fever and increase confusion and not as alert as normal for them. Ems reported nursing home staff found her like this at shift change around 6:30am today.

## 2019-01-04 NOTE — ED PROVIDER NOTES
EMERGENCY DEPARTMENT ENCOUNTER    CHIEF COMPLAINT  Chief Complaint: Fever  History given by:Pt  History limited by:Nothing  Time Seen: 0742  Room Number: 50/50  PMD: Charly Ryder MD      HPI:  Pt is a 83 y.o. female who presents with fever, cough, and congestion which began around 1 day ago. The pt denies any pain. The pt states that she has a h/o UTI.     Per NH, the pt has been more confused than her baseline since 0630 today.     Duration: 1 day  Timing:Constant  Location:Generalized  Quality:Elevated temperature (100.5)   Intensity/Severity:Moderate  Progression:No change  Associated Symptoms:Cough, congestion  Aggravating Factors:Unknown  Alleviating Factors:Unknown  Previous Episodes:Unknown  Treatment before arrival:None    PAST MEDICAL HISTORY  Active Ambulatory Problems     Diagnosis Date Noted   • Hepatic encephalopathy (CMS/HCC) 10/26/2017   • Cirrhosis of liver (CMS/HCC) 10/26/2017   • Atrial fibrillation (CMS/HCC) 10/26/2017   • UTI (urinary tract infection) 10/26/2017   • Hypertension 10/26/2017   • DNR (do not resuscitate) 10/27/2017   • Leukocytosis 11/02/2017   • Long term current use of anticoagulant 11/02/2017   • Alcoholic cirrhosis of liver (CMS/HCC) 11/02/2017   • C. difficile colitis 11/03/2017     Resolved Ambulatory Problems     Diagnosis Date Noted   • No Resolved Ambulatory Problems     Past Medical History:   Diagnosis Date   • Acute cystitis with hematuria    • Allergic rhinitis    • Aphasia    • Atrial fibrillation (CMS/HCC)    • Cirrhosis of liver (CMS/HCC)    • Encephalopathy    • GERD (gastroesophageal reflux disease)    • Hyperglycemia    • Hypertension    • Malignant neoplasm (CMS/HCC)    • Muscle weakness    • Non-traumatic intracranial hemorrhage (CMS/HCC)    • Osteoarthritis    • Psoriasis    • SVT (supraventricular tachycardia) (CMS/HCC)    • UTI (urinary tract infection)        PAST SURGICAL HISTORY  History reviewed. No pertinent surgical history.    FAMILY HISTORY  Family  History   Problem Relation Age of Onset   • Hypertension Other        SOCIAL HISTORY  Social History     Socioeconomic History   • Marital status:      Spouse name: Not on file   • Number of children: Not on file   • Years of education: Not on file   • Highest education level: Not on file   Social Needs   • Financial resource strain: Not on file   • Food insecurity - worry: Not on file   • Food insecurity - inability: Not on file   • Transportation needs - medical: Not on file   • Transportation needs - non-medical: Not on file   Occupational History   • Not on file   Tobacco Use   • Smoking status: Former Smoker   • Smokeless tobacco: Never Used   Substance and Sexual Activity   • Alcohol use: No   • Drug use: No   • Sexual activity: No   Other Topics Concern   • Not on file   Social History Narrative   • Not on file         ALLERGIES  Adhesive tape; Indocin [indomethacin]; and Penicillins    REVIEW OF SYSTEMS  Review of Systems   Constitutional: Positive for fever. Negative for activity change, appetite change ( decreased), chills and fatigue.   HENT: Positive for congestion. Negative for ear pain, rhinorrhea and sore throat.    Eyes: Negative.    Respiratory: Positive for cough. Negative for shortness of breath.    Cardiovascular: Negative.  Negative for chest pain, palpitations and leg swelling ( pedal).   Gastrointestinal: Negative.  Negative for abdominal pain, constipation, diarrhea, nausea and vomiting.   Endocrine: Negative.    Genitourinary: Negative.  Negative for decreased urine volume, difficulty urinating, dysuria, menstrual problem, pelvic pain, urgency and vaginal discharge.   Musculoskeletal: Negative.  Negative for back pain.   Skin: Negative.  Negative for rash.   Allergic/Immunologic: Negative.    Neurological: Negative.  Negative for dizziness, weakness, light-headedness, numbness and headaches.   Hematological: Negative.    Psychiatric/Behavioral: Positive for confusion (Per NH). The  patient is not nervous/anxious.    All other systems reviewed and are negative.      PHYSICAL EXAM  ED Triage Vitals [01/04/19 0728]   Temp Heart Rate Resp BP SpO2   100.5 °F (38.1 °C) 92 -- 160/70 99 %      Temp src Heart Rate Source Patient Position BP Location FiO2 (%)   Tympanic -- -- -- --       Physical Exam   Constitutional: She is well-developed, well-nourished, and in no distress. She appears dehydrated. No distress.   HENT:   Head: Normocephalic and atraumatic.   Mouth/Throat: Oropharynx is clear and moist and mucous membranes are normal.   Eyes: Pupils are equal, round, and reactive to light.   Neck: Normal range of motion.   Cardiovascular: Regular rhythm and normal heart sounds. Tachycardia present.   Pulmonary/Chest: Effort normal. She has decreased breath sounds. She has no wheezes.   Abdominal: Soft. Bowel sounds are normal. There is no tenderness.   Musculoskeletal: Normal range of motion. She exhibits no edema.   Neurological: She is alert. She is disoriented.   Skin: Skin is warm and dry. No rash noted.   Psychiatric: Mood, memory, affect and judgment normal.   Nursing note and vitals reviewed.      LAB RESULTS  Recent Results (from the past 24 hour(s))   Comprehensive Metabolic Panel    Collection Time: 01/04/19  8:23 AM   Result Value Ref Range    Glucose 109 (H) 65 - 99 mg/dL    BUN 15 8 - 23 mg/dL    Creatinine 0.72 0.57 - 1.00 mg/dL    Sodium 138 136 - 145 mmol/L    Potassium 4.4 3.5 - 5.2 mmol/L    Chloride 104 98 - 107 mmol/L    CO2 24.2 22.0 - 29.0 mmol/L    Calcium 9.3 8.6 - 10.5 mg/dL    Total Protein 7.0 6.0 - 8.5 g/dL    Albumin 2.80 (L) 3.50 - 5.20 g/dL    ALT (SGPT) 14 1 - 33 U/L    AST (SGOT) 31 1 - 32 U/L    Alkaline Phosphatase 149 (H) 39 - 117 U/L    Total Bilirubin 2.5 (H) 0.1 - 1.2 mg/dL    eGFR Non African Amer 77 >60 mL/min/1.73    Globulin 4.2 gm/dL    A/G Ratio 0.7 g/dL    BUN/Creatinine Ratio 20.8 7.0 - 25.0    Anion Gap 9.8 mmol/L   Troponin    Collection Time: 01/04/19   8:23 AM   Result Value Ref Range    Troponin T <0.010 0.000 - 0.030 ng/mL   BNP    Collection Time: 01/04/19  8:23 AM   Result Value Ref Range    proBNP 523.4 0.0-1,800.0 pg/mL   Lactic Acid, Plasma    Collection Time: 01/04/19  8:23 AM   Result Value Ref Range    Lactate 1.7 0.5 - 2.0 mmol/L   Procalcitonin    Collection Time: 01/04/19  8:23 AM   Result Value Ref Range    Procalcitonin 0.07 (L) 0.10 - 0.25 ng/mL   CBC Auto Differential    Collection Time: 01/04/19  8:23 AM   Result Value Ref Range    WBC 7.87 4.50 - 10.70 10*3/mm3    RBC 4.07 3.90 - 5.20 10*6/mm3    Hemoglobin 13.6 11.9 - 15.5 g/dL    Hematocrit 39.2 35.6 - 45.5 %    MCV 96.3 80.5 - 98.2 fL    MCH 33.4 (H) 26.9 - 32.0 pg    MCHC 34.7 32.4 - 36.3 g/dL    RDW 15.0 (H) 11.7 - 13.0 %    RDW-SD 53.0 37.0 - 54.0 fl    MPV 11.5 6.0 - 12.0 fL    Platelets 143 140 - 500 10*3/mm3    Neutrophil % 77.6 (H) 42.7 - 76.0 %    Lymphocyte % 13.9 (L) 19.6 - 45.3 %    Monocyte % 7.5 5.0 - 12.0 %    Eosinophil % 0.6 0.3 - 6.2 %    Basophil % 0.4 0.0 - 1.5 %    Immature Grans % 0.3 0.0 - 0.5 %    Neutrophils, Absolute 6.11 1.90 - 8.10 10*3/mm3    Lymphocytes, Absolute 1.09 0.90 - 4.80 10*3/mm3    Monocytes, Absolute 0.59 0.20 - 1.20 10*3/mm3    Eosinophils, Absolute 0.05 0.00 - 0.70 10*3/mm3    Basophils, Absolute 0.03 0.00 - 0.20 10*3/mm3    Immature Grans, Absolute 0.02 0.00 - 0.03 10*3/mm3   Light Blue Top    Collection Time: 01/04/19  8:25 AM   Result Value Ref Range    Extra Tube hold for add-on    Gold Top - SST    Collection Time: 01/04/19  8:25 AM   Result Value Ref Range    Extra Tube Hold for add-ons.    Influenza Antigen, Rapid - Swab, Nasopharynx    Collection Time: 01/04/19  8:30 AM   Result Value Ref Range    Influenza A Ag, EIA Negative Negative    Influenza B Ag, EIA Negative Negative   Urinalysis With Culture If Indicated - Urine, Catheter In/Out    Collection Time: 01/04/19  8:47 AM   Result Value Ref Range    Color, UA Dark Yellow (A) Yellow, Straw     Appearance, UA Cloudy (A) Clear    pH, UA 6.0 5.0 - 8.0    Specific Gravity, UA 1.024 1.005 - 1.030    Glucose, UA Negative Negative    Ketones, UA Negative Negative    Bilirubin, UA Negative Negative    Blood, UA Small (1+) (A) Negative    Protein, UA Negative Negative    Leuk Esterase, UA Large (3+) (A) Negative    Nitrite, UA Positive (A) Negative    Urobilinogen, UA 1.0 E.U./dL 0.2 - 1.0 E.U./dL   Urinalysis, Microscopic Only - Urine, Catheter In/Out    Collection Time: 01/04/19  8:47 AM   Result Value Ref Range    RBC, UA 0-2 None Seen, 0-2 /HPF    WBC, UA Too Numerous to Count (A) None Seen, 0-2 /HPF    Bacteria, UA 4+ (A) None Seen /HPF    Squamous Epithelial Cells, UA 0-2 None Seen, 0-2 /HPF    Hyaline Casts, UA None Seen None Seen /LPF    Methodology Manual Light Microscopy        I ordered the above labs and reviewed the results    RADIOLOGY  CT Head Without Contrast   Preliminary Result       The study is moderately compromised by motion artifact but otherwise   demonstrates no convincing evidence for acute intracranial pathology.       Radiation dose reduction techniques were utilized, including automated   exposure control and exposure modulation based on body size.              XR Chest 1 View   Final Result   A single view of the chest demonstrates the heart to be within normal  limits. There is prominence of the pulmonary vasculature suggesting mild  congestive changes. There is no evidence of consolidation or effusion.     This report was finalized on 1/4/2019 8:39 AM by Dr. Michael Rosario M.D.       I ordered the above noted radiological studies and reviewed the images on the PACS system.       EKG    ekg was interpreted by Dr. Chino      MEDICAL RECORD REVIEW  The pt has a h/o cirrhosis, UTI, and encephalopathy.         PROGRESS AND CONSULTS  0747 Ordered flu antigen, procalcitonin, CMP, UA, troponin, BNP, blood cultures, lactate, CBC, EKG, CXR, and CT head (due to AMS) for further  evaluation.     0927 Ordered urine culture for further evaluation.     1001 Ordered rocephin for the pt's UTI.     1103 Reviewed pt's history and workup with Dr. Chino. After a bedside evaluation; Dr Chino agrees with the plan of care    1104 Placed call to [facility] for admission.     1144 Discussed the pt with Dr. Montes De Oca ([facility]) who agrees to admit the pt.     1147 Ordered initiate observation status.     COURSE & MEDICAL DECISION MAKING  Pertinent Labs and Imaging studies that were ordered and reviewed are noted above.  Results were reviewed/discussed with the patient and they were also made aware of online assess.   Pt also made aware that some labs, such as cultures, will not be resulted during ER visit and follow up with PMD is necessary.     MEDICATIONS GIVEN IN ER  Medications   cefTRIAXone (ROCEPHIN) IVPB 1 g (0 g Intravenous Stopped 1/4/19 1120)       /68 (Patient Position: Lying)   Pulse 102   Temp 100.5 °F (38.1 °C) (Tympanic)   Resp 18   Wt 87.5 kg (193 lb)   SpO2 99%   BMI 35.30 kg/m²     ADMISSION    Discussed treatment plan and reason for admission with pt/family and admitting physician.  Pt/family voiced understanding of the plan for admission for further testing/treatment as needed.      DIAGNOSIS  Final diagnoses:   Altered mental status, unspecified altered mental status type   Urinary tract infection without hematuria, site unspecified           Documentation assistance provided by lavinia Lowe for ANGELES Rodas.  Information recorded by the scribjuanis was done at my direction and has been verified and validated by me.       Arin Lowe  01/04/19 0748       Arin Lowe  01/04/19 1148       Lyric Goodwin APRN  01/04/19 1230

## 2019-01-04 NOTE — PLAN OF CARE
Problem: Patient Care Overview  Goal: Plan of Care Review  Outcome: Ongoing (interventions implemented as appropriate)   01/04/19 2345   Coping/Psychosocial   Plan of Care Reviewed With patient   OTHER   Outcome Summary vss, disorinented x4, iv fluids started, remedy fungal powder and cream ordered, see note, cont. on falls & BA     Goal: Individualization and Mutuality  Outcome: Ongoing (interventions implemented as appropriate)    Goal: Discharge Needs Assessment  Outcome: Ongoing (interventions implemented as appropriate)    Goal: Interprofessional Rounds/Family Conf  Outcome: Ongoing (interventions implemented as appropriate)      Problem: Urinary Tract Infection (Adult)  Goal: Signs and Symptoms of Listed Potential Problems Will be Absent, Minimized or Managed (Urinary Tract Infection)  Outcome: Ongoing (interventions implemented as appropriate)    Goal: Signs and Symptoms of Listed Potential Problems Will be Absent, Minimized or Managed (Urinary Tract Infection)  Outcome: Ongoing (interventions implemented as appropriate)      Problem: Skin Injury Risk (Adult)  Goal: Identify Related Risk Factors and Signs and Symptoms  Outcome: Ongoing (interventions implemented as appropriate)

## 2019-01-04 NOTE — ED PROVIDER NOTES
Pt is a 83 y.o. female who presents to the ED from nursing home w/ fever, and increased confusion. Pt was found this AM by nursing staff at facility. Pt is DNR.      On exam,  Constitutional: elderly, chronically ill appearing. Disoriented x3. Diffusely weak. No focal weakness.  Cardiovascular: rate 90 - low 100s, chronic venous stasis an edema (L greater than R)  Pulmonary: 100% on 2.5 L   Abdomen: obese, soft, non tender  Musculoskeletal: moves all extremities.   Neurological: AMS, but follow some commands at times. Will talk nonsense.     Labs and imaging reviewed; UTI, head CT negative    Plan: Admission       MD ATTESTATION NOTE    The OFELIA and I have discussed this patient's history, physical exam, and treatment plan.  I have reviewed the documentation and personally had a face to face interaction with the patient. I affirm the documentation and agree with the treatment and plan.  The attached note describes my personal findings.      Documentation assistance provided by lavinia Arceo for Dr. Chino. Information recorded by the scribe was done at my direction and has been verified and validated by me.             Anny Arceo  01/04/19 1111       Jeremie Chino MD  01/04/19 6103

## 2019-01-04 NOTE — DISCHARGE PLACEMENT REQUEST
Ochsner, Dorothy BRYANNA (83 y.o. Female)     Date of Birth Social Security Number Address Home Phone MRN    [id number]  [id number]  [place] [address] [phone] [id number]    Yarsanism Marital Status          [place]        Admission Date Admission Type Admitting Provider Attending Provider Department, Room/Bed    1/4/19 Emergency Gus Montes De Oca MD Beard, Lyle E, MD [address], P678/1    Discharge Date Discharge Disposition Discharge Destination                       Attending Provider:  Gus Montes De Oca MD    Allergies:  Adhesive Tape, Indocin [Indomethacin], Penicillins    Isolation:  None   Infection:  None   Code Status:  Prior    Ht:  --   Wt:  87.5 kg (193 lb)    Admission Cmt:  None   Principal Problem:  None                Active Insurance as of 1/4/2019     Primary Coverage     Payor Plan Insurance Group Employer/Plan Group    MEDICARE MEDICARE A & B      Payor Plan Address Payor Plan Phone Number Payor Plan Fax Number Effective Dates    [address] [phone]  12/1/2000 - None Entered    [facility] [id number]       Subscriber Name Subscriber Birth Date Member ID       OCHSNER,DOROTHY A 1935 [id number]           Secondary Coverage     Payor Plan Insurance Group Employer/Plan Group    [facility] SUPP [id number]     Payor Plan Address Payor Plan Phone Number Payor Plan Fax Number Effective Dates    [address]   12/1/2016 - None Entered    [facility] [id number]       Subscriber Name Subscriber Birth Date Member ID       OCHSNER,DOROTHY A 1935 [id number]                 Emergency Contacts      (Rel.) Home Phone Work Phone Mobile Phone    Ochsner,Wayne (Son) [phone] -- --    Angelique Pan (Relative) -- -- [phone]    Ochsner,Ron (Spouse) [phone] -- [phone]

## 2019-01-04 NOTE — PROGRESS NOTES
[facility]    Physicians Statement of Medical Necessity for Ambulance Transportation    It is medically necessary for: non emergent ambulace transportation back to [facility]    Patient Name: Dorothy A Ochsner    Insurance Information:  [employer] # [id number] / [facility] # [id number]    To be transported by ambulance: To [facility]    From: [facility]    To: [facility]    Date of Service: ___________________________    For dialysis patients state date dialysis began:N/A    Diagnosis: UTI, and encephalopathy.        Past Medical/Surgical History:  Past Medical History:   Diagnosis Date   • Acute cystitis with hematuria    • Allergic rhinitis    • Aphasia    • Atrial fibrillation (CMS/HCC)    • Cirrhosis of liver (CMS/HCC)    • Encephalopathy    • GERD (gastroesophageal reflux disease)    • Hyperglycemia    • Hypertension    • Malignant neoplasm (CMS/HCC)    • Muscle weakness    • Non-traumatic intracranial hemorrhage (CMS/HCC)    • Osteoarthritis    • Psoriasis    • SVT (supraventricular tachycardia) (CMS/HCC)    • UTI (urinary tract infection)       History reviewed. No pertinent surgical history.     Current Objective Medical Evidence(including physical exam finding to support reason for limitations):    Immobilization syndrome     Other:     RN, Discharge Planner       Date/Time:   1/4/2019 @ 1652     Printed Name:  Amanda Hunt RN    [facility]   Phone: [phone] Phone: [phone]   Fax: [phone] Fax: [phone]

## 2019-01-04 NOTE — PROGRESS NOTES
Discharge Planning Assessment  [facility]     Patient Name: Dorothy A Ochsner  MRN: [id number]  Today's Date: 1/4/2019    Admit Date: 1/4/2019    Discharge Needs Assessment    No documentation.       Discharge Plan     Row Name 01/04/19 1624       Plan    Plan  Pt is from [facility], long term care and can return    Plan Comments  Per Se with [facility], pt if from a long term care bed and can return. HIPPA compliant voice mail left for pt's son Wayne Ochsner [phone] advising of pt being observation and request for a return call to confirm the plans is for his mother to return to [facility] at discharge.     Return call from pt's son Wayne Ochsner, he confirmed the plan at discharge will be for his mother to return to [facility].  I reviewed with him that his mother is observation and we will be billing Medicare B and her coinsurance, at his request I faxed a copy of the [facility] Observation Letter to him at [phone].  Transfer packet in pt's folder.      Amanda Hunt RN        Destination - Selection Complete      Service Provider Request Status Selected Services Address Phone Number Fax Number    [facility] [id number] [address] [phone]            Amanda Hunt RN

## 2019-01-04 NOTE — CONSULTS
"Adult Nutrition  Assessment/PES    Patient Name:  Dorothy A Ochsner  YOB: 1935  MRN: [id number]  Admit Date:  1/4/2019    Assessment Date:  1/4/2019    Comments:  Nursing nutrition screen. No indictors identified based on available information. Will follow for intake as diet advanced.     Reason for Assessment     Row Name 01/04/19 1646          Reason for Assessment    Reason For Assessment  identified at risk by screening criteria     Identified At Risk by Screening Criteria  no indicators present         Nutrition/Diet History     Row Name 01/04/19 1646          Nutrition/Diet History    Typical Food/Fluid Intake  [facility] resident admitted with confusion, UTI, weakness         Anthropometrics     Row Name 01/04/19 1647          Anthropometrics    Height  157.5 cm (62.01\")     Weight  87.5 kg (193 lb)        Ideal Body Weight (IBW)    Ideal Body Weight (IBW) (kg)  50.45     % Ideal Body Weight  173.53        Usual Body Weight (UBW)    Usual Body Weight  87.5 kg (193 lb)     % Usual Body Weight  100        Body Mass Index (BMI)    BMI (kg/m2)  35.36     BMI Assessment  BMI 35-39.9: obesity grade II        IBW Adjustment, Para/Tetraplegia    5% Adjustment, Para (IBW)  47.93     10% Adjustment, Para (IBW)  45.4     10% Adjustment, Tetra (IBW)  45.4     15% Adjustment, Tetra (IBW)  42.88         Labs/Tests/Procedures/Meds     Row Name 01/04/19 1648          Labs/Procedures/Meds    Lab Results Reviewed  reviewed, pertinent        Diagnostic Tests/Procedures    Diagnostic Test/Procedure Reviewed  reviewed, pertinent        Medications    Pertinent Medications Reviewed  reviewed, pertinent     Pertinent Medications Comments  antibiotics         Physical Findings     Row Name 01/04/19 1648          Physical Findings    Overall Physical Appearance  obese;overweight     Skin  -- intact         Estimated/Assessed Needs     Row Name 01/04/19 1647          Calculation Measurements    Height  157.5 cm (62.01\")     " "    Nutrition Prescription Ordered     Row Name 01/04/19 1648          Nutrition Prescription PO    Current PO Diet  NPO         Evaluation of Received Nutrient/Fluid Intake     Row Name 01/04/19 1647          Calculation Measurements    Height  157.5 cm (62.01\")         Evaluation of Prescribed Nutrient/Fluid Intake     Row Name 01/04/19 1647          Calculation Measurements    Height  157.5 cm (62.01\")             Problem/Interventions:  Problem 1     Row Name 01/04/19 1648          Nutrition Diagnoses Problem 1    Problem 1  No Nutrition Diagnosis at this Time     Etiology (related to)  MNT for Treatment/Condition     Signs/Symptoms (evidenced by)  NPO                 Intervention Goal     Row Name 01/04/19 1648          Intervention Goal    General  Disease management/therapy;Maintain nutrition     PO  Tolerate PO     Weight  No significant weight loss         Nutrition Intervention     Row Name 01/04/19 1649          Nutrition Intervention    RD/Tech Action  Care plan reviewd;Follow Tx progress;Await begin PO           Education/Evaluation     Row Name 01/04/19 1649          Monitor/Evaluation    Monitor  Per protocol           Electronically signed by:  Candelaria Espinosa RD, LD  01/04/19 4:49 PM  "

## 2019-01-04 NOTE — H&P
HISTORY AND PHYSICAL   [facility]        Patient Identification:  Name: Dorothy A Ochsner  Age: 83 y.o.  Sex: female  :  1935  MRN: [id number]                     Primary Care Physician: Charly Ryder MD    Chief Complaint:  confusion    History of Present Illness:         The patient is an 83-year-old white female with history of recurring urinary tract infections, A. fib on anticoagulation, cirrhosis, history of encephalopathy, GERD, hypertension, osteoarthritis, psoriasis and history of SVT who is admitted with history from nursing home of increasing confusion on the day of admission.  The patient was sent to the ER for evaluation.  The patient quite confused and doesn't even know her own name.  Evaluation the ER revealed that she had urinary tract infection was started on some IV antibiotics.  Urine culture was obtained.  The patient was admitted for further evaluation treatment for continued IV antibiotics and IV fluid hydration and further evaluation.    Past Medical History:  Past Medical History:   Diagnosis Date   • Acute cystitis with hematuria    • Allergic rhinitis    • Aphasia    • Atrial fibrillation (CMS/HCC)    • Cirrhosis of liver (CMS/HCC)    • Encephalopathy    • GERD (gastroesophageal reflux disease)    • Hyperglycemia    • Hypertension    • Malignant neoplasm (CMS/HCC)    • Muscle weakness    • Non-traumatic intracranial hemorrhage (CMS/HCC)    • Osteoarthritis    • Psoriasis    • SVT (supraventricular tachycardia) (CMS/HCC)    • UTI (urinary tract infection)      Past Surgical History:  History reviewed. No pertinent surgical history.   Home Meds:  Medications Prior to Admission   Medication Sig Dispense Refill Last Dose   • apixaban (ELIQUIS) 5 MG tablet tablet Take 2.5 mg by mouth 2 (Two) Times a Day.   10/26/2017 at 0920   • bimatoprost (LUMIGAN) 0.01 % ophthalmic drops Administer 1 drop to both eyes Every Night.      • ferrous sulfate 325 (65 FE) MG tablet Take 325 mg  by mouth Daily With Breakfast.      • furosemide (LASIX) 40 MG tablet Take 40 mg by mouth Every Other Day.      • Lactobacillus (FLORANEX) pack oral packet Take  by mouth Daily.      • lactulose (CHRONULAC) 10 GM/15ML solution Take 45 mL by mouth 3 (Three) Times a Day. (Patient taking differently: Take 30 g by mouth 2 (Two) Times a Day.)      • metoprolol succinate XL (TOPROL-XL) 25 MG 24 hr tablet Take 12.5 mg by mouth Every 12 (Twelve) Hours.   1/3/2019 at Unknown time   • Multiple Vitamins-Minerals (MULTIVITAMIN ADULT PO) Take 1 capsule by mouth Daily.   1/3/2019 at Unknown time   • potassium chloride (MICRO-K) 10 MEQ CR capsule Take 10 mEq by mouth every night at bedtime.        Current meds    Current Facility-Administered Medications:   •  acetaminophen (TYLENOL) 160 MG/5ML solution 650 mg, 650 mg, Oral, Q4H PRN, Gus Montes De Oca MD  •  acetaminophen (TYLENOL) suppository 650 mg, 650 mg, Rectal, Q4H PRN, Gus Montes De Oca MD  •  acetaminophen (TYLENOL) tablet 650 mg, 650 mg, Oral, Q4H PRN, Gus Montes De Oca MD  •  apixaban (ELIQUIS) tablet 2.5 mg, 2.5 mg, Oral, Q12H, Gus Montes De Oca MD  •  [START ON 1/5/2019] cefTRIAXone (ROCEPHIN) IVPB 1 g, 1 g, Intravenous, Q24H, Gus Montes De Oca MD  •  lactobacillus acidophilus (RISAQUAD) capsule 1 capsule, 1 capsule, Oral, Daily, Gus Montes De Oca MD  •  lactulose (CHRONULAC) 10 GM/15ML solution 30 g, 30 g, Oral, TID, Gus Montes De Oca MD  •  latanoprost (XALATAN) 0.005 % ophthalmic solution 1 drop, 1 drop, Both Eyes, Nightly, Gus Montes De Oca MD  •  metoprolol succinate XL (TOPROL-XL) 24 hr tablet 12.5 mg, 12.5 mg, Oral, Q12H, Gus Montes De Oca MD  •  ondansetron (ZOFRAN) tablet 4 mg, 4 mg, Oral, Q6H PRN **OR** ondansetron ODT (ZOFRAN-ODT) disintegrating tablet 4 mg, 4 mg, Oral, Q6H PRN **OR** ondansetron (ZOFRAN) injection 4 mg, 4 mg, Intravenous, Q6H PRN, Gus Montes De Oca MD  •  sodium chloride 0.9 % flush 3 mL, 3 mL, Intravenous, Q12H, Gus Montes De Oca MD  •  sodium chloride 0.9 % flush 3-10  mL, 3-10 mL, Intravenous, PRN, Gus Montes De Oca MD  •  Sodium chloride 0.9 % infusion, 75 mL/hr, Intravenous, Continuous, Gus Montes De Oca MD  Allergies:  Allergies   Allergen Reactions   • Adhesive Tape    • Indocin [Indomethacin]    • Penicillins      Immunizations:  There is no immunization history for the selected administration types on file for this patient.  Social History:   Social History     Social History Narrative   • Not on file     Social History     Socioeconomic History   • Marital status:      Spouse name: Not on file   • Number of children: Not on file   • Years of education: Not on file   • Highest education level: Not on file   Social Needs   • Financial resource strain: Not on file   • Food insecurity - worry: Not on file   • Food insecurity - inability: Not on file   • Transportation needs - medical: Not on file   • Transportation needs - non-medical: Not on file   Occupational History   • Not on file   Tobacco Use   • Smoking status: Former Smoker   • Smokeless tobacco: Never Used   Substance and Sexual Activity   • Alcohol use: No   • Drug use: No   • Sexual activity: No   Other Topics Concern   • Not on file   Social History Narrative   • Not on file       Family History:  Family History   Problem Relation Age of Onset   • Hypertension Other         Review of Systems  See history of present illness and past medical history.  Patient is severely demented and confused and not able to give any significant history that has any meaningful value.  She doesn't even know her own name.  Remainder of ROS is negative.    Objective:  tMax 24 hrs: Temp (24hrs), Av.9 °F (37.2 °C), Min:97.6 °F (36.4 °C), Max:100.5 °F (38.1 °C)    Vitals Ranges:   Temp:  [97.6 °F (36.4 °C)-100.5 °F (38.1 °C)] 97.6 °F (36.4 °C)  Heart Rate:  [] 93  Resp:  [18] 18  BP: (133-160)/(65-73) 147/65      Exam:  /65 (BP Location: Left arm, Patient Position: Lying)   Pulse 93   Temp 97.6 °F (36.4 °C) (Oral)    Resp 18   Wt 87.5 kg (193 lb)   SpO2 95%   BMI 35.30 kg/m²     General Appearance:    Confused, no distress, appears stated age   Head:    Normocephalic, without obvious abnormality, atraumatic   Eyes:    PERRL, conjunctiva/corneas clear, EOM's intact, both eyes   Ears:    Normal external ear canals, both ears   Nose:   Nares normal, septum midline, mucosa normal, no drainage    or sinus tenderness   Throat:   Lips, mucosa, and tongue normal   Neck:   Supple, symmetrical, trachea midline, no adenopathy;     thyroid:  no enlargement/tenderness/nodules; no carotid    bruit or JVD   Back:     Symmetric, no curvature, ROM normal, no CVA tenderness   Lungs:     Clear to auscultation bilaterally, respirations unlabored   Chest Wall:    No tenderness or deformity    Heart:    Regular rate and rhythm, S1 and S2 normal, no murmur, rub   or gallop   Abdomen:     Soft, non-tender, bowel sounds active all four quadrants,     no masses, no hepatomegaly, no splenomegaly   Extremities:   Extremities normal, atraumatic, no cyanosis or edema   Pulses:   2+ and symmetric all extremities   Skin:   Skin color, texture, turgor normal, no rashes or lesions   Lymph nodes:   Cervical, supraclavicular, and axillary nodes normal   Neurologic:   Confused and demented       .    Data Review:  Lab Results (last 72 hours)     Procedure Component Value Units Date/Time    Blood Culture - Blood, Arm, Left [[id number]] Collected:  01/04/19 1053    Specimen:  Blood from Arm, Left Updated:  01/04/19 1056    [place] Draw [[id number]] Collected:  01/04/19 0825    Specimen:  Blood Updated:  01/04/19 0930    Narrative:       The following orders were created for panel order [place] Draw.  Procedure                               Abnormality         Status                     ---------                               -----------         ------                     Light Blue Top[[id number]]                                   Final result               Gold Top  - SST[[id number]]                                   Final result                 Please view results for these tests on the individual orders.    Light Blue Top [[id number]] Collected:  01/04/19 0825    Specimen:  Blood Updated:  01/04/19 0930     Extra Tube hold for add-on     Comment: Auto resulted       Gold Top - SST [[id number]] Collected:  01/04/19 0825    Specimen:  Blood Updated:  01/04/19 0930     Extra Tube Hold for add-ons.     Comment: Auto resulted.       Urinalysis, Microscopic Only - Urine, Catheter In/Out [[id number]]  (Abnormal) Collected:  01/04/19 0847    Specimen:  Urine, Catheter In/Out Updated:  01/04/19 0927     RBC, UA 0-2 /HPF      WBC, UA Too Numerous to Count /HPF      Bacteria, UA 4+ /HPF      Squamous Epithelial Cells, UA 0-2 /HPF      Hyaline Casts, UA None Seen /LPF      Methodology Manual Light Microscopy    Urine Culture - Urine, Urine, Catheter In/Out [[id number]] Collected:  01/04/19 0847    Specimen:  Urine, Catheter In/Out Updated:  01/04/19 0927    Influenza Antigen, Rapid - Swab, Nasopharynx [[id number]]  (Normal) Collected:  01/04/19 0830    Specimen:  Swab from Nasopharynx Updated:  01/04/19 0922     Influenza A Ag, EIA Negative     Influenza B Ag, EIA Negative    Urinalysis With Culture If Indicated - Urine, Catheter In/Out [[id number]]  (Abnormal) Collected:  01/04/19 0847    Specimen:  Urine, Catheter In/Out Updated:  01/04/19 0914     Color, UA Dark Yellow     Appearance, UA Cloudy     pH, UA 6.0     Specific Gravity, UA 1.024     Glucose, UA Negative     Ketones, UA Negative     Bilirubin, UA Negative     Blood, UA Small (1+)     Protein, UA Negative     Leuk Esterase, UA Large (3+)     Nitrite, UA Positive     Urobilinogen, UA 1.0 E.U./dL    Procalcitonin [[id number]]  (Abnormal) Collected:  01/04/19 0823    Specimen:  Blood Updated:  01/04/19 0911     Procalcitonin 0.07 ng/mL     Narrative:       As a Marker for Sepsis (Non-Neonates):   1. <0.5 ng/mL represents a low risk of  "severe sepsis and/or septic shock.  1. >2 ng/mL represents a high risk of severe sepsis and/or septic shock.    As a Marker for Lower Respiratory Tract Infections that require antibiotic therapy:  PCT on Admission     Antibiotic Therapy             6-12 Hrs later  > 0.5                Strongly Recommended            >0.25 - <0.5         Recommended  0.1 - 0.25           Discouraged                   Remeasure/reassess PCT  <0.1                 Strongly Discouraged          Remeasure/reassess PCT      As 28 day mortality risk marker: \"Change in Procalcitonin Result\" (> 80 % or <=80 %) if Day 0 (or Day 1) and Day 4 values are available. Refer to http://www.Noribachipct-calculator.com/   Change in PCT <=80 %   A decrease of PCT levels below or equal to 80 % defines a positive change in PCT test result representing a higher risk for 28-day all-cause mortality of patients diagnosed with severe sepsis or septic shock.  Change in PCT > 80 %   A decrease of PCT levels of more than 80 % defines a negative change in PCT result representing a lower risk for 28-day all-cause mortality of patients diagnosed with severe sepsis or septic shock.                Comprehensive Metabolic Panel [[id number]]  (Abnormal) Collected:  01/04/19 0823    Specimen:  Blood Updated:  01/04/19 0904     Glucose 109 mg/dL      BUN 15 mg/dL      Creatinine 0.72 mg/dL      Sodium 138 mmol/L      Potassium 4.4 mmol/L      Chloride 104 mmol/L      CO2 24.2 mmol/L      Calcium 9.3 mg/dL      Total Protein 7.0 g/dL      Albumin 2.80 g/dL      ALT (SGPT) 14 U/L      AST (SGOT) 31 U/L      Alkaline Phosphatase 149 U/L      Total Bilirubin 2.5 mg/dL      eGFR Non African Amer 77 mL/min/1.73      Globulin 4.2 gm/dL      A/G Ratio 0.7 g/dL      BUN/Creatinine Ratio 20.8     Anion Gap 9.8 mmol/L     Narrative:       The MDRD GFR formula is only valid for adults with stable renal function between ages 18 and 70.    Troponin [[id number]]  (Normal) Collected:  " 01/04/19 0823    Specimen:  Blood Updated:  01/04/19 0904     Troponin T <0.010 ng/mL     Narrative:       Troponin T Reference Ranges:  Less than 0.03 ng/mL:    Negative for AMI  0.03 to 0.09 ng/mL:      Indeterminant for AMI  Greater than 0.09 ng/mL: Positive for AMI    BNP [[id number]]  (Normal) Collected:  01/04/19 0823    Specimen:  Blood Updated:  01/04/19 0902     proBNP 523.4 pg/mL     Narrative:       Among patients with dyspnea, NT-proBNP is highly sensitive for the detection of acute congestive heart failure. In addition NT-proBNP of <300 pg/ml effectively rules out acute congestive heart failure with 99% negative predictive value.    Lactic Acid, Plasma [[id number]]  (Normal) Collected:  01/04/19 0823    Specimen:  Blood Updated:  01/04/19 0850     Lactate 1.7 mmol/L     CBC & Differential [[id number]] Collected:  01/04/19 0823    Specimen:  Blood Updated:  01/04/19 0842    Narrative:       The following orders were created for panel order CBC & Differential.  Procedure                               Abnormality         Status                     ---------                               -----------         ------                     CBC Auto Differential[[id number]]        Abnormal            Final result                 Please view results for these tests on the individual orders.    CBC Auto Differential [[id number]]  (Abnormal) Collected:  01/04/19 0823    Specimen:  Blood Updated:  01/04/19 0842     WBC 7.87 10*3/mm3      RBC 4.07 10*6/mm3      Hemoglobin 13.6 g/dL      Hematocrit 39.2 %      MCV 96.3 fL      MCH 33.4 pg      MCHC 34.7 g/dL      RDW 15.0 %      RDW-SD 53.0 fl      MPV 11.5 fL      Platelets 143 10*3/mm3      Neutrophil % 77.6 %      Lymphocyte % 13.9 %      Monocyte % 7.5 %      Eosinophil % 0.6 %      Basophil % 0.4 %      Immature Grans % 0.3 %      Neutrophils, Absolute 6.11 10*3/mm3      Lymphocytes, Absolute 1.09 10*3/mm3      Monocytes, Absolute 0.59 10*3/mm3      Eosinophils,  Absolute 0.05 10*3/mm3      Basophils, Absolute 0.03 10*3/mm3      Immature Grans, Absolute 0.02 10*3/mm3     Blood Culture - Blood, Arm, Left [[id number]] Collected:  01/04/19 0823    Specimen:  Blood from Arm, Left Updated:  01/04/19 0834                   Imaging Results (all)     Procedure Component Value Units Date/Time    CT Head Without Contrast [[id number]] Collected:  01/04/19 0927     Updated:  01/04/19 0927    Narrative:       CT HEAD WITHOUT CONTRAST     CLINICAL HISTORY: Confusion.     TECHNIQUE: CT scan of the head was obtained with 3 mm axial images. No  intravenous contrast was administered.     Comparison is made to prior CT scan of the head dated 10/26/2017.     FINDINGS:     Study is moderately compromised by motion artifact. The ventricles,  sulci, and cisterns are age appropriate. The gray-white matter  differentiation is within normal limits. The basal ganglia and thalami  are unremarkable. Moderately extensive changes of chronic small vessel  ischemic phenomena are identified.       Impression:          The study is moderately compromised by motion artifact but otherwise  demonstrates no convincing evidence for acute intracranial pathology.     Radiation dose reduction techniques were utilized, including automated  exposure control and exposure modulation based on body size.          XR Chest 1 View [[id number]] Collected:  01/04/19 0838     Updated:  01/04/19 0842    Narrative:       PORTABLE CHEST     HISTORY: Cough.     A single view of the chest demonstrates the heart to be within normal  limits. There is prominence of the pulmonary vasculature suggesting mild  congestive changes. There is no evidence of consolidation or effusion.     This report was finalized on 1/4/2019 8:39 AM by Dr. Michael Rosario M.D.           Past Medical History:   Diagnosis Date   • Acute cystitis with hematuria    • Allergic rhinitis    • Aphasia    • Atrial fibrillation (CMS/HCC)    • Cirrhosis of liver (CMS/HCC)     • Encephalopathy    • GERD (gastroesophageal reflux disease)    • Hyperglycemia    • Hypertension    • Malignant neoplasm (CMS/HCC)    • Muscle weakness    • Non-traumatic intracranial hemorrhage (CMS/HCC)    • Osteoarthritis    • Psoriasis    • SVT (supraventricular tachycardia) (CMS/HCC)    • UTI (urinary tract infection)        Assessment:  Active Hospital Problems    Diagnosis Date Noted   • **Altered mental status [R41.82] 01/04/2019   • Leukocytosis [D72.829] 11/02/2017   • Long term current use of anticoagulant [Z79.01] 11/02/2017   • Alcoholic cirrhosis of liver (CMS/HCC) [K70.30] 11/02/2017   • DNR (do not resuscitate) [Z66] 10/27/2017   • UTI (urinary tract infection) [N39.0] 10/26/2017   • Hypertension [I10] 10/26/2017   • Hepatic encephalopathy (CMS/HCC) [K72.90] 10/26/2017      Resolved Hospital Problems   No resolved problems to display.       Plan:  The patient's admitted to hospital and will continue with broad-spectrum IV antibiotics.  Await results of cultures of urine and blood.  Continue with some IV fluid hydration and some of her home medicines.  Will last for speech therapy for swallow eval to see if she can get a diet and also PT evaluation.  Prior nursing home papers she is DNR.    Gus Montes De Oca MD  1/4/2019  4:29 PM

## 2019-01-04 NOTE — PROGRESS NOTES
Discharge Planning Assessment  [facility]     Patient Name: Dorothy A Ochsner  MRN: [id number]  Today's Date: 1/4/2019    Admit Date: 1/4/2019    Discharge Needs Assessment    No documentation.       Discharge Plan     Row Name 01/04/19 1158       Plan    Plan Comments  -- Pt is from [facility] and spoke with Se Villavicencio who states she will be able to return there at discharge.        Destination      No service coordination in this encounter.      Durable Medical Equipment      No service coordination in this encounter.      Dialysis/Infusion      No service coordination in this encounter.      Home Medical Care      No service coordination in this encounter.      Community Resources      No service coordination in this encounter.          Demographic Summary    No documentation.       Functional Status    No documentation.       Psychosocial    No documentation.       Abuse/Neglect    No documentation.       Legal    No documentation.       Substance Abuse    No documentation.       Patient Forms    No documentation.           Deepali Mckinney RN

## 2019-01-05 LAB
ANION GAP SERPL CALCULATED.3IONS-SCNC: 7.9 MMOL/L
BASOPHILS # BLD AUTO: 0.03 10*3/MM3 (ref 0–0.2)
BASOPHILS NFR BLD AUTO: 0.4 % (ref 0–1.5)
BUN BLD-MCNC: 18 MG/DL (ref 8–23)
BUN/CREAT SERPL: 26.9 (ref 7–25)
CALCIUM SPEC-SCNC: 8.9 MG/DL (ref 8.6–10.5)
CHLORIDE SERPL-SCNC: 106 MMOL/L (ref 98–107)
CO2 SERPL-SCNC: 23.1 MMOL/L (ref 22–29)
CREAT BLD-MCNC: 0.67 MG/DL (ref 0.57–1)
DEPRECATED RDW RBC AUTO: 54.5 FL (ref 37–54)
EOSINOPHIL # BLD AUTO: 0.14 10*3/MM3 (ref 0–0.7)
EOSINOPHIL NFR BLD AUTO: 2 % (ref 0.3–6.2)
ERYTHROCYTE [DISTWIDTH] IN BLOOD BY AUTOMATED COUNT: 15.4 % (ref 11.7–13)
GFR SERPL CREATININE-BSD FRML MDRD: 84 ML/MIN/1.73
GLUCOSE BLD-MCNC: 78 MG/DL (ref 65–99)
HCT VFR BLD AUTO: 33.7 % (ref 35.6–45.5)
HGB BLD-MCNC: 11.5 G/DL (ref 11.9–15.5)
IMM GRANULOCYTES # BLD AUTO: 0.02 10*3/MM3 (ref 0–0.03)
IMM GRANULOCYTES NFR BLD AUTO: 0.3 % (ref 0–0.5)
LYMPHOCYTES # BLD AUTO: 2.65 10*3/MM3 (ref 0.9–4.8)
LYMPHOCYTES NFR BLD AUTO: 38.7 % (ref 19.6–45.3)
MCH RBC QN AUTO: 33 PG (ref 26.9–32)
MCHC RBC AUTO-ENTMCNC: 34.1 G/DL (ref 32.4–36.3)
MCV RBC AUTO: 96.6 FL (ref 80.5–98.2)
MONOCYTES # BLD AUTO: 1.09 10*3/MM3 (ref 0.2–1.2)
MONOCYTES NFR BLD AUTO: 15.9 % (ref 5–12)
NEUTROPHILS # BLD AUTO: 2.93 10*3/MM3 (ref 1.9–8.1)
NEUTROPHILS NFR BLD AUTO: 43 % (ref 42.7–76)
PLATELET # BLD AUTO: 112 10*3/MM3 (ref 140–500)
PMV BLD AUTO: 11.2 FL (ref 6–12)
POTASSIUM BLD-SCNC: 3.9 MMOL/L (ref 3.5–5.2)
RBC # BLD AUTO: 3.49 10*6/MM3 (ref 3.9–5.2)
SODIUM BLD-SCNC: 137 MMOL/L (ref 136–145)
WBC NRBC COR # BLD: 6.84 10*3/MM3 (ref 4.5–10.7)

## 2019-01-05 PROCEDURE — G0378 HOSPITAL OBSERVATION PER HR: HCPCS

## 2019-01-05 PROCEDURE — 97162 PT EVAL MOD COMPLEX 30 MIN: CPT

## 2019-01-05 PROCEDURE — 80048 BASIC METABOLIC PNL TOTAL CA: CPT | Performed by: HOSPITALIST

## 2019-01-05 PROCEDURE — 85025 COMPLETE CBC W/AUTO DIFF WBC: CPT | Performed by: HOSPITALIST

## 2019-01-05 PROCEDURE — 25010000002 CEFTRIAXONE PER 250 MG: Performed by: HOSPITALIST

## 2019-01-05 PROCEDURE — 92610 EVALUATE SWALLOWING FUNCTION: CPT

## 2019-01-05 PROCEDURE — 97110 THERAPEUTIC EXERCISES: CPT

## 2019-01-05 RX ADMIN — LACTULOSE 30 G: 10 SOLUTION ORAL at 08:39

## 2019-01-05 RX ADMIN — METOPROLOL SUCCINATE 12.5 MG: 25 TABLET, FILM COATED, EXTENDED RELEASE ORAL at 21:24

## 2019-01-05 RX ADMIN — LACTULOSE 30 G: 10 SOLUTION ORAL at 15:42

## 2019-01-05 RX ADMIN — LATANOPROST 1 DROP: 50 SOLUTION/ DROPS OPHTHALMIC at 21:25

## 2019-01-05 RX ADMIN — LACTULOSE 30 G: 10 SOLUTION ORAL at 21:25

## 2019-01-05 RX ADMIN — METOPROLOL SUCCINATE 12.5 MG: 25 TABLET, FILM COATED, EXTENDED RELEASE ORAL at 08:40

## 2019-01-05 RX ADMIN — APIXABAN 2.5 MG: 2.5 TABLET, FILM COATED ORAL at 21:25

## 2019-01-05 RX ADMIN — CEFTRIAXONE SODIUM 1 G: 1 INJECTION, SOLUTION INTRAVENOUS at 10:11

## 2019-01-05 RX ADMIN — SODIUM CHLORIDE 75 ML/HR: 9 INJECTION, SOLUTION INTRAVENOUS at 13:07

## 2019-01-05 RX ADMIN — Medication 1 CAPSULE: at 08:39

## 2019-01-05 RX ADMIN — SODIUM CHLORIDE 75 ML/HR: 9 INJECTION, SOLUTION INTRAVENOUS at 08:39

## 2019-01-05 RX ADMIN — APIXABAN 2.5 MG: 2.5 TABLET, FILM COATED ORAL at 08:39

## 2019-01-05 NOTE — PROGRESS NOTES
"DAILY PROGRESS NOTE  [facility]    Patient Identification:  Name: Dorothy A Ochsner  Age: 83 y.o.  Sex: female  :  1935  MRN: [id number]         Primary Care Physician: Charly Ryder MD    Subjective:  Interval History:She is confused.    Objective:    Scheduled Meds:  apixaban 2.5 mg Oral Q12H   ceftriaxone 1 g Intravenous Q24H   lactobacillus acidophilus 1 capsule Oral Daily   lactulose 30 g Oral TID   latanoprost 1 drop Both Eyes Nightly   metoprolol succinate XL 12.5 mg Oral Q12H   sodium chloride 3 mL Intravenous Q12H     Continuous Infusions:  Sodium chloride 75 mL/hr Last Rate: 75 mL/hr (19 0839)       Vital signs in last 24 hours:  Temp:  [97.6 °F (36.4 °C)-99.7 °F (37.6 °C)] 98 °F (36.7 °C)  Heart Rate:  [60-93] 60  Resp:  [18] 18  BP: (118-147)/(52-73) 118/52    Intake/Output:    Intake/Output Summary (Last 24 hours) at 2019 1226  Last data filed at 2019 1011  Gross per 24 hour   Intake 1192.25 ml   Output --   Net 1192.25 ml       Exam:  /52 (BP Location: Right arm, Patient Position: Lying)   Pulse 60   Temp 98 °F (36.7 °C) (Oral)   Resp 18   Ht 157.5 cm (62.01\")   Wt 87.5 kg (193 lb)   SpO2 93%   BMI 35.29 kg/m²     General Appearance:    Confused, no distress   Head:    Normocephalic, without obvious abnormality, atraumatic   Eyes:       Throat:   Lips, tongue, gums normal   Neck:   Supple, symmetrical, trachea midline, no JVD   Lungs:     Clear to auscultation bilaterally, respirations unlabored   Chest Wall:    No tenderness or deformity    Heart:    Regular rate and rhythm, S1 and S2 normal, no murmur,no  Rub or gallop   Abdomen:     Soft, non-tender, bowel sounds active, no masses, no organomegaly    Extremities:   Extremities normal, atraumatic, no cyanosis or edema   Pulses:      Skin:   Skin is warm and dry,  no rashes or palpable lesions   Neurologic:   no focal deficits noted, confused      Lab Results (last 72 hours)     Procedure Component " Value Units Date/Time    Blood Culture - Blood, Arm, Left [[id number]] Collected:  01/04/19 1053    Specimen:  Blood from Arm, Left Updated:  01/05/19 1100     Blood Culture No growth at 24 hours    Blood Culture - Blood, Arm, Left [[id number]] Collected:  01/04/19 0823    Specimen:  Blood from Arm, Left Updated:  01/05/19 0845     Blood Culture No growth at 24 hours    Urine Culture - Urine, Urine, Catheter In/Out [[id number]]  (Abnormal) Collected:  01/04/19 0847    Specimen:  Urine, Catheter In/Out Updated:  01/05/19 0818     Urine Culture >100,000 CFU/mL Gram Negative Bacilli    CBC & Differential [[id number]] Collected:  01/05/19 0542    Specimen:  Blood Updated:  01/05/19 0710    Narrative:       The following orders were created for panel order CBC & Differential.  Procedure                               Abnormality         Status                     ---------                               -----------         ------                     CBC Auto Differential[[id number]]        Abnormal            Final result                 Please view results for these tests on the individual orders.    CBC Auto Differential [[id number]]  (Abnormal) Collected:  01/05/19 0542    Specimen:  Blood Updated:  01/05/19 0710     WBC 6.84 10*3/mm3      RBC 3.49 10*6/mm3      Hemoglobin 11.5 g/dL      Hematocrit 33.7 %      MCV 96.6 fL      MCH 33.0 pg      MCHC 34.1 g/dL      RDW 15.4 %      RDW-SD 54.5 fl      MPV 11.2 fL      Platelets 112 10*3/mm3      Neutrophil % 43.0 %      Lymphocyte % 38.7 %      Monocyte % 15.9 %      Eosinophil % 2.0 %      Basophil % 0.4 %      Immature Grans % 0.3 %      Neutrophils, Absolute 2.93 10*3/mm3      Lymphocytes, Absolute 2.65 10*3/mm3      Monocytes, Absolute 1.09 10*3/mm3      Eosinophils, Absolute 0.14 10*3/mm3      Basophils, Absolute 0.03 10*3/mm3      Immature Grans, Absolute 0.02 10*3/mm3     Basic Metabolic Panel [[id number]]  (Abnormal) Collected:  01/05/19 0542    Specimen:  Blood Updated:   01/05/19 0654     Glucose 78 mg/dL      BUN 18 mg/dL      Creatinine 0.67 mg/dL      Sodium 137 mmol/L      Potassium 3.9 mmol/L      Chloride 106 mmol/L      CO2 23.1 mmol/L      Calcium 8.9 mg/dL      eGFR Non African Amer 84 mL/min/1.73      BUN/Creatinine Ratio 26.9     Anion Gap 7.9 mmol/L     Narrative:       The MDRD GFR formula is only valid for adults with stable renal function between ages 18 and 70.    Ammonia [[id number]]  (Abnormal) Collected:  01/04/19 1701    Specimen:  Blood Updated:  01/04/19 1751     Ammonia 125 umol/L     [place] Draw [[id number]] Collected:  01/04/19 0825    Specimen:  Blood Updated:  01/04/19 0930    Narrative:       The following orders were created for panel order [place] Draw.  Procedure                               Abnormality         Status                     ---------                               -----------         ------                     Light Blue Top[[id number]]                                   Final result               Gold Top - SST[[id number]]                                   Final result                 Please view results for these tests on the individual orders.    Light Blue Top [[id number]] Collected:  01/04/19 0825    Specimen:  Blood Updated:  01/04/19 0930     Extra Tube hold for add-on     Comment: Auto resulted       Gold Top - SST [[id number]] Collected:  01/04/19 0825    Specimen:  Blood Updated:  01/04/19 0930     Extra Tube Hold for add-ons.     Comment: Auto resulted.       Urinalysis, Microscopic Only - Urine, Catheter In/Out [[id number]]  (Abnormal) Collected:  01/04/19 0847    Specimen:  Urine, Catheter In/Out Updated:  01/04/19 0927     RBC, UA 0-2 /HPF      WBC, UA Too Numerous to Count /HPF      Bacteria, UA 4+ /HPF      Squamous Epithelial Cells, UA 0-2 /HPF      Hyaline Casts, UA None Seen /LPF      Methodology Manual Light Microscopy    Influenza Antigen, Rapid - Swab, Nasopharynx [[id number]]  (Normal) Collected:  01/04/19 0830    Specimen:  " Swab from Nasopharynx Updated:  01/04/19 0922     Influenza A Ag, EIA Negative     Influenza B Ag, EIA Negative    Urinalysis With Culture If Indicated - Urine, Catheter In/Out [[id number]]  (Abnormal) Collected:  01/04/19 0847    Specimen:  Urine, Catheter In/Out Updated:  01/04/19 0914     Color, UA Dark Yellow     Appearance, UA Cloudy     pH, UA 6.0     Specific Gravity, UA 1.024     Glucose, UA Negative     Ketones, UA Negative     Bilirubin, UA Negative     Blood, UA Small (1+)     Protein, UA Negative     Leuk Esterase, UA Large (3+)     Nitrite, UA Positive     Urobilinogen, UA 1.0 E.U./dL    Procalcitonin [[id number]]  (Abnormal) Collected:  01/04/19 0823    Specimen:  Blood Updated:  01/04/19 0911     Procalcitonin 0.07 ng/mL     Narrative:       As a Marker for Sepsis (Non-Neonates):   1. <0.5 ng/mL represents a low risk of severe sepsis and/or septic shock.  1. >2 ng/mL represents a high risk of severe sepsis and/or septic shock.    As a Marker for Lower Respiratory Tract Infections that require antibiotic therapy:  PCT on Admission     Antibiotic Therapy             6-12 Hrs later  > 0.5                Strongly Recommended            >0.25 - <0.5         Recommended  0.1 - 0.25           Discouraged                   Remeasure/reassess PCT  <0.1                 Strongly Discouraged          Remeasure/reassess PCT      As 28 day mortality risk marker: \"Change in Procalcitonin Result\" (> 80 % or <=80 %) if Day 0 (or Day 1) and Day 4 values are available. Refer to http://www.[facility]-pct-calculator.com/   Change in PCT <=80 %   A decrease of PCT levels below or equal to 80 % defines a positive change in PCT test result representing a higher risk for 28-day all-cause mortality of patients diagnosed with severe sepsis or septic shock.  Change in PCT > 80 %   A decrease of PCT levels of more than 80 % defines a negative change in PCT result representing a lower risk for 28-day all-cause mortality of patients " diagnosed with severe sepsis or septic shock.                Comprehensive Metabolic Panel [[id number]]  (Abnormal) Collected:  01/04/19 0823    Specimen:  Blood Updated:  01/04/19 0904     Glucose 109 mg/dL      BUN 15 mg/dL      Creatinine 0.72 mg/dL      Sodium 138 mmol/L      Potassium 4.4 mmol/L      Chloride 104 mmol/L      CO2 24.2 mmol/L      Calcium 9.3 mg/dL      Total Protein 7.0 g/dL      Albumin 2.80 g/dL      ALT (SGPT) 14 U/L      AST (SGOT) 31 U/L      Alkaline Phosphatase 149 U/L      Total Bilirubin 2.5 mg/dL      eGFR Non African Amer 77 mL/min/1.73      Globulin 4.2 gm/dL      A/G Ratio 0.7 g/dL      BUN/Creatinine Ratio 20.8     Anion Gap 9.8 mmol/L     Narrative:       The MDRD GFR formula is only valid for adults with stable renal function between ages 18 and 70.    Troponin [[id number]]  (Normal) Collected:  01/04/19 0823    Specimen:  Blood Updated:  01/04/19 0904     Troponin T <0.010 ng/mL     Narrative:       Troponin T Reference Ranges:  Less than 0.03 ng/mL:    Negative for AMI  0.03 to 0.09 ng/mL:      Indeterminant for AMI  Greater than 0.09 ng/mL: Positive for AMI    BNP [[id number]]  (Normal) Collected:  01/04/19 0823    Specimen:  Blood Updated:  01/04/19 0902     proBNP 523.4 pg/mL     Narrative:       Among patients with dyspnea, NT-proBNP is highly sensitive for the detection of acute congestive heart failure. In addition NT-proBNP of <300 pg/ml effectively rules out acute congestive heart failure with 99% negative predictive value.    Lactic Acid, Plasma [[id number]]  (Normal) Collected:  01/04/19 0823    Specimen:  Blood Updated:  01/04/19 0850     Lactate 1.7 mmol/L     CBC & Differential [[id number]] Collected:  01/04/19 0823    Specimen:  Blood Updated:  01/04/19 0842    Narrative:       The following orders were created for panel order CBC & Differential.  Procedure                               Abnormality         Status                     ---------                                -----------         ------                     CBC Auto Differential[[id number]]        Abnormal            Final result                 Please view results for these tests on the individual orders.    CBC Auto Differential [[id number]]  (Abnormal) Collected:  01/04/19 0823    Specimen:  Blood Updated:  01/04/19 0842     WBC 7.87 10*3/mm3      RBC 4.07 10*6/mm3      Hemoglobin 13.6 g/dL      Hematocrit 39.2 %      MCV 96.3 fL      MCH 33.4 pg      MCHC 34.7 g/dL      RDW 15.0 %      RDW-SD 53.0 fl      MPV 11.5 fL      Platelets 143 10*3/mm3      Neutrophil % 77.6 %      Lymphocyte % 13.9 %      Monocyte % 7.5 %      Eosinophil % 0.6 %      Basophil % 0.4 %      Immature Grans % 0.3 %      Neutrophils, Absolute 6.11 10*3/mm3      Lymphocytes, Absolute 1.09 10*3/mm3      Monocytes, Absolute 0.59 10*3/mm3      Eosinophils, Absolute 0.05 10*3/mm3      Basophils, Absolute 0.03 10*3/mm3      Immature Grans, Absolute 0.02 10*3/mm3         Data Review:  Results from last 7 days   Lab Units  01/05/19   0542  01/04/19 0823   SODIUM mmol/L  137  138   POTASSIUM mmol/L  3.9  4.4   CHLORIDE mmol/L  106  104   CO2 mmol/L  23.1  24.2   BUN mg/dL  18  15   CREATININE mg/dL  0.67  0.72   GLUCOSE mg/dL  78  109*   CALCIUM mg/dL  8.9  9.3     Results from last 7 days   Lab Units  01/05/19   0542  01/04/19 0823   WBC 10*3/mm3  6.84  7.87   HEMOGLOBIN g/dL  11.5*  13.6   HEMATOCRIT %  33.7*  39.2   PLATELETS 10*3/mm3  112*  143             Lab Results   Lab Value Date/Time    TROPONINT <0.010 01/04/2019 0823    TROPONINT <0.010 10/26/2017 1143         Results from last 7 days   Lab Units  01/04/19   0823   ALK PHOS U/L  149*   BILIRUBIN mg/dL  2.5*   ALT (SGPT) U/L  14   AST (SGOT) U/L  31             No results found for: POCGLU        Past Medical History:   Diagnosis Date   • Acute cystitis with hematuria    • Allergic rhinitis    • Aphasia    • Atrial fibrillation (CMS/HCC)    • Cirrhosis of liver (CMS/HCC)    • Encephalopathy     • GERD (gastroesophageal reflux disease)    • Hyperglycemia    • Hypertension    • Malignant neoplasm (CMS/HCC)    • Muscle weakness    • Non-traumatic intracranial hemorrhage (CMS/HCC)    • Osteoarthritis    • Psoriasis    • SVT (supraventricular tachycardia) (CMS/HCC)    • UTI (urinary tract infection)        Assessment:  Active Hospital Problems    Diagnosis Date Noted   • **Altered mental status [R41.82] 01/04/2019   • Leukocytosis [D72.829] 11/02/2017   • Long term current use of anticoagulant [Z79.01] 11/02/2017   • Alcoholic cirrhosis of liver (CMS/HCC) [K70.30] 11/02/2017   • DNR (do not resuscitate) [Z66] 10/27/2017   • UTI (urinary tract infection) [N39.0] 10/26/2017   • Hypertension [I10] 10/26/2017   • Hepatic encephalopathy (CMS/HCC) [K72.90] 10/26/2017      Resolved Hospital Problems   No resolved problems to display.       Plan:  Continue antibiotics and IV fluids. Await cultures.    Gus Montes De Oca MD  1/5/2019  12:26 PM

## 2019-01-05 NOTE — THERAPY DISCHARGE NOTE
Acute Care - Speech Language Pathology   Swallow Eval/Discharge [facility]     Patient Name: Dorothy A Ochsner  : 1935  MRN: [id number]  Today's Date: 2019        Referring Physician: (P) Dr. Montes De Oca      Admit Date: 2019    Visit Dx:    ICD-10-CM ICD-9-CM   1. Altered mental status, unspecified altered mental status type R41.82 780.97   2. Urinary tract infection without hematuria, site unspecified N39.0 599.0   3. Impaired functional mobility, balance, gait, and endurance Z74.09 V49.89   4. Oropharyngeal dysphagia R13.12 787.22     Patient Active Problem List   Diagnosis   • Hepatic encephalopathy (CMS/HCC)   • Cirrhosis of liver (CMS/HCC)   • Atrial fibrillation (CMS/HCC)   • UTI (urinary tract infection)   • Hypertension   • DNR (do not resuscitate)   • Leukocytosis   • Long term current use of anticoagulant   • Alcoholic cirrhosis of liver (CMS/HCC)   • C. difficile colitis   • Altered mental status     Past Medical History:   Diagnosis Date   • Acute cystitis with hematuria    • Allergic rhinitis    • Aphasia    • Atrial fibrillation (CMS/HCC)    • Cirrhosis of liver (CMS/HCC)    • Encephalopathy    • GERD (gastroesophageal reflux disease)    • Hyperglycemia    • Hypertension    • Malignant neoplasm (CMS/HCC)    • Muscle weakness    • Non-traumatic intracranial hemorrhage (CMS/HCC)    • Osteoarthritis    • Psoriasis    • SVT (supraventricular tachycardia) (CMS/HCC)    • UTI (urinary tract infection)      History reviewed. No pertinent surgical history.       SWALLOW EVALUATION (last 72 hours)      SLP Adult Swallow Evaluation     Row Name 19 0900                   Rehab Evaluation    Document Type  evaluation  -LS        Subjective Information  no complaints  -LS        Patient Effort  good  -LS           General Information    Patient Profile Reviewed  yes  -LS        Pertinent History Of Current Problem  Pt is an 83 yr old female with hx of recurring urinary tract, UTI , A-FIB , on  anticoagulation, cirrhosis, hx of encephalopathy, GERD, HTN, osteoarthritis, psoriasis and hx of SVT who was admitted with hx from nursing home of increasing confusion.  -LS        Current Method of Nutrition  regular textures;thin liquids  -        Patient's Goals for Discharge  patient did not state  -           Oral Motor and Function    Dentition Assessment  upper dentures/partial in place;lower dentures/partial in place  -LS        Secretion Management  WNL/WFL  -LS        Mucosal Quality  moist, healthy  -LS           Clinical Swallow Eval    Oral Prep Phase  WFL  -LS        Oral Transit  WFL  -LS        Oral Residue  WFL  -LS        Pharyngeal Phase  no overt signs/symptoms of pharyngeal impairment  -LS           Clinical Impression    SLP Swallowing Diagnosis  functional oral phase;functional pharyngeal phase  -        Functional Impact  no impact on function  -LS        Rehab Potential/Prognosis, Swallowing  good, to achieve stated therapy goals  -        Swallow Criteria for Skilled Therapeutic Interventions Met  no problems identified which require skilled intervention  -           Recommendations    Therapy Frequency (Swallow)  PRN  -        Predicted Duration Therapy Intervention (Days)  until discharge  -        SLP Diet Recommendation  regular textures;thin liquids  -        Monitor for Signs of Aspiration  yes;notify SLP if any concerns;cough;elevated WBC count;throat clearing;fever  -        Anticipated Dischage Disposition  [facility] nursing [facility]  -          User Key  (r) = Recorded By, (t) = Taken By, (c) = Cosigned By    Initials Name Effective Dates    Steve Morales MS [facility]-SLP 06/08/18 -           EDUCATION  The patient has been educated in the following areas:   Dysphagia (Swallowing Impairment).    SLP Recommendation and Plan  SLP Swallowing Diagnosis: functional oral phase, functional pharyngeal phase  SLP Diet Recommendation: regular textures, thin liquids      Monitor for Signs of Aspiration: yes, notify SLP if any concerns, cough, elevated WBC count, throat clearing, fever     Swallow Criteria for Skilled Therapeutic Interventions Met: no problems identified which require skilled intervention  Anticipated Dischage Disposition: skilled nursing facility  Rehab Potential/Prognosis, Swallowing: good, to achieve stated therapy goals  Therapy Frequency (Swallow): PRN  Predicted Duration Therapy Intervention (Days): until discharge       Outcome Summary: pt is functional with regular diet and thin liquids.           SLP Outcome Measures (last 72 hours)      SLP Outcome Measures     Row Name 01/05/19 0900             SLP Outcome Measures    Outcome Measure Used?  Adult NOMS  -LS         Adult FCM Scores    FCM Chosen  Swallowing  -      Swallowing FCM Score  7  -        User Key  (r) = Recorded By, (t) = Taken By, (c) = Cosigned By    Initials Name Effective Dates    Steve Morales MS CCC-SLP 06/08/18 -            Time Calculation:   Time Calculation- SLP     Row Name 01/05/19 1017             Time Calculation- SLP    SLP Received On  01/05/19  -        User Key  (r) = Recorded By, (t) = Taken By, (c) = Cosigned By    Initials Name Provider Type    Steve Morales MS CCC-SLP Speech and Language Pathologist          Therapy Charges for Today     Code Description Service Date Service Provider Modifiers Qty    34748365861 HC ST SWALLOWING CURRENT STATUS 1/5/2019 Steve Reyes MS CCC-SLP  1    74582625186 HC ST SWALLOWING PROJECTED 1/5/2019 Steve Reyes MS CCC-SLP  1    89334982134 HC ST SWALLOWING DISCHARGE 1/5/2019 Steve Reyes MS CCC-SLP  1    94693499388 HC ST EVAL ORAL PHARYNG SWALLOW 4 1/5/2019 Steve Reyes MS CCC-SLP GN 1          SLP G-Codes  Functional Limitations: Swallowing  Swallow Current Status (): 0 percent impaired, limited or restricted  Swallow Goal Status (): 0 percent impaired, limited or restricted  Swallow Discharge  Status (): 0 percent impaired, limited or restricted    SLP Discharge Summary  Anticipated Dischage Disposition: skilled nursing facility    Steev Reyes MS CCC-SLP  1/5/2019

## 2019-01-05 NOTE — PLAN OF CARE
Problem: Patient Care Overview  Goal: Plan of Care Review  Outcome: Ongoing (interventions implemented as appropriate)   01/05/19 1029   Coping/Psychosocial   Plan of Care Reviewed With patient   OTHER   Outcome Summary Pt is an 83 y.o. female presenting to PT with impaired mobility, generalized weakness, and decreased activity tolerance following UTI. Her baseline cognition is unclear, but she is very confused at this time and was only oriented to person. Attempted sitting up at the EOB which required MaxAx1 for assistance at the BUE/BLE. Pt able to sit at EOB for approx 1 minutes before requesting to lay back down due to discomfort in the low back. Attempted exercises, however pt became highly agitated with PT and was non-cooperative. Due to poor subjective history it is also unclear her baseline level of mobility. Pt will benefit from continuing skilled PT to improve her independence with functional mobility. PT recommends d/c to SNF pending progress when medically appropriate.

## 2019-01-05 NOTE — PLAN OF CARE
Problem: Patient Care Overview  Goal: Plan of Care Review   01/05/19 1014   OTHER   Outcome Summary pt is functional with regular diet and thin liquids.

## 2019-01-05 NOTE — THERAPY EVALUATION
Acute Care - Physical Therapy Initial Evaluation  [facility]     Patient Name: Dorothy A Ochsner  : 1935  MRN: [id number]  Today's Date: 2019      Date of Referral to PT: 19  Referring Physician: Dr. Montes De Oca      Admit Date: 2019    Visit Dx:     ICD-10-CM ICD-9-CM   1. Altered mental status, unspecified altered mental status type R41.82 780.97   2. Urinary tract infection without hematuria, site unspecified N39.0 599.0   3. Impaired functional mobility, balance, gait, and endurance Z74.09 V49.89   4. Oropharyngeal dysphagia R13.12 787.22     Patient Active Problem List   Diagnosis   • Hepatic encephalopathy (CMS/HCC)   • Cirrhosis of liver (CMS/HCC)   • Atrial fibrillation (CMS/HCC)   • UTI (urinary tract infection)   • Hypertension   • DNR (do not resuscitate)   • Leukocytosis   • Long term current use of anticoagulant   • Alcoholic cirrhosis of liver (CMS/HCC)   • C. difficile colitis   • Altered mental status     Past Medical History:   Diagnosis Date   • Acute cystitis with hematuria    • Allergic rhinitis    • Aphasia    • Atrial fibrillation (CMS/HCC)    • Cirrhosis of liver (CMS/HCC)    • Encephalopathy    • GERD (gastroesophageal reflux disease)    • Hyperglycemia    • Hypertension    • Malignant neoplasm (CMS/HCC)    • Muscle weakness    • Non-traumatic intracranial hemorrhage (CMS/HCC)    • Osteoarthritis    • Psoriasis    • SVT (supraventricular tachycardia) (CMS/HCC)    • UTI (urinary tract infection)      History reviewed. No pertinent surgical history.     PT ASSESSMENT (last 12 hours)      Physical Therapy Evaluation     Row Name 19 1002          PT Evaluation Time/Intention    Subjective Information  complains of;weakness;fatigue;pain  -DO     Document Type  evaluation  -DO     Mode of Treatment  physical therapy  -DO     Total Evaluation Minutes, Physical Therapy  14  -DO     Patient Effort  poor  -DO     Symptoms Noted During/After Treatment  fatigue  -DO     Comment   Pt tolerated evaluation well with no adverse s/s noted; VSS throughout.   -DO     Row Name 01/05/19 1002          General Information    Patient Profile Reviewed?  yes  -DO     Referring Physician  Dr. Montes De Oca  -DO     Patient Observations  poorly cooperative;lethargic very confused  -DO     Patient/Family Observations  Pt sleeping in bed in no apparent distress upon arrival. Pt mildly difficult to wake up and keep awake.  -DO     Prior Level of Function  min assist:;all household mobility unable to obtain; lived at NH prior  -DO     Equipment Currently Used at Home  -- unable to obtain  -DO     Existing Precautions/Restrictions  fall  -DO     Benefits Reviewed  patient:;improve function;increase independence;increase strength;increase balance  -DO     Barriers to Rehab  previous functional deficit;cognitive status  -DO     Row Name 01/05/19 1002          Relationship/Environment    Lives With  facility resident lives at [facility]  -DO     Row Name 01/05/19 1002          Resource/Environmental Concerns    Current Living Arrangements  extended care facility  -DO     Resource/Environmental Concerns  none  -DO     Row Name 01/05/19 1002          Cognitive Assessment/Intervention- PT/OT    Orientation Status (Cognition)  oriented to;person;verbal cues/prompts needed for orientation;unable/difficult to assess  -DO     Follows Commands (Cognition)  follows one step commands;25-49% accuracy;delayed response/completion;physical/tactile prompts required;repetition of directions required;verbal cues/prompting required  -DO     Safety Deficit (Cognitive)  severe deficit;ability to follow commands;awareness of need for assistance;impulsivity;insight into deficits/self awareness;judgment;safety precautions awareness;safety precautions follow-through/compliance  -DO     Row Name 01/05/19 1002          Safety Issues, Functional Mobility    Impairments Affecting Function (Mobility)  balance;cognition;endurance/activity  tolerance;strength  -DO     Row Name 01/05/19 1002          Bed Mobility Assessment/Treatment    Bed Mobility Assessment/Treatment  supine-sit;sit-supine  -DO     Supine-Sit [place] (Bed Mobility)  maximum assist (25% patient effort);verbal cues;nonverbal cues (demo/gesture)  -DO     Sit-Supine [place] (Bed Mobility)  maximum assist (25% patient effort);verbal cues;nonverbal cues (demo/gesture)  -DO     Bed Mobility, Safety Issues  decreased use of arms for pushing/pulling;decreased use of legs for bridging/pushing;impaired trunk control for bed mobility  -DO     Assistive Device (Bed Mobility)  draw sheet;head of bed elevated;bed rails  -DO     Row Name 01/05/19 1002          Transfer Assessment/Treatment    Transfer Assessment/Treatment  sit-stand transfer;stand-sit transfer  -DO     Sit-Stand [place] (Transfers)  not tested  -DO     Stand-Sit [place] (Transfers)  not tested  -DO     Row Name 01/05/19 1002          Gait/Stairs Assessment/Training    [place] Level (Gait)  not tested  -DO     [place] Level (Stairs)  not tested  -DO     Row Name 01/05/19 1002          General ROM    GENERAL ROM COMMENTS  Pt demonstrates AROm deficits limited primarily by strength and ability to follow directions in the BUE/BLE.   -DO     Row Name 01/05/19 1002          MMT (Manual Muscle Testing)    General MMT Comments  Pt demonstrates at least 2/5 MMT for the BUe/BLE, with pt having difficulty following directions for accurate assessment of strength.   -DO     Row Name 01/05/19 1002          Sensory Assessment/Intervention    Sensory General Assessment  no sensation deficits identified  -DO     Row Name 01/05/19 1002          Vision Assessment/Intervention    Visual Impairment/Limitations  WFL;unable/difficult to assess  -DO     Row Name 01/05/19 1002          Pain Assessment    Additional Documentation  Pain Scale: FACES Pre/Post-Treatment (Group)  -DO     Row Name 01/05/19 1002          Pain Scale:  Numbers Pre/Post-Treatment    Pain Location - Side  Bilateral  -DO     Pain Location - Orientation  lower  -DO     Pain Location  knee  -DO     Pain Intervention(s)  Medication (See MAR);Repositioned;Ambulation/increased activity  -DO     Row Name 01/05/19 1002          Pain Scale: FACES Pre/Post-Treatment    Pain: FACES Scale, Pretreatment  0-->no hurt  -DO     Pain: FACES Scale, Post-Treatment  2-->hurts little bit  -DO     Row Name 01/05/19 1002          Physical Therapy Clinical Impression    Date of Referral to PT  01/05/19  -DO     PT Diagnosis (PT Clinical Impression)  Impaired mobility, generalized weakness, decreased activity tolerance  -DO     Prognosis (PT Clinical Impression)  fair  -DO     Functional Level at Time of Evaluation (PT Clinical Impression)  MaxAx1   -DO     Criteria for Skilled Interventions Met (PT Clinical Impression)  yes  -DO     Pathology/Pathophysiology Noted (Describe Specifically for Each System)  musculoskeletal  -DO     Impairments Found (describe specific impairments)  aerobic capacity/endurance;gait, locomotion, and balance;muscle performance  -DO     Functional Limitations in Following Categories (Describe Specific Limitations)  self-care;home management;community/leisure  -DO     Rehab Potential (PT Clinical Summary)  good, to achieve stated therapy goals  -DO     Row Name 01/05/19 1002          Physical Therapy Goals    Bed Mobility Goal Selection (PT)  bed mobility, PT goal 1  -DO     Transfer Goal Selection (PT)  transfer, PT goal 1  -DO     Gait Training Goal Selection (PT)  gait training, PT goal 1  -DO     Row Name 01/05/19 1002          Bed Mobility Goal 1 (PT)    Activity/Assistive Device (Bed Mobility Goal 1, PT)  bed mobility activities, all  -DO     [place] Level/Cues Needed (Bed Mobility Goal 1, PT)  minimum assist (75% or more patient effort)  -DO     Time Frame (Bed Mobility Goal 1, PT)  long term goal (LTG);1 week  -DO     Progress/Outcomes (Bed Mobility  Goal 1, PT)  continuing progress toward goal  -DO     Row Name 01/05/19 1002          Transfer Goal 1 (PT)    Activity/Assistive Device (Transfer Goal 1, PT)  transfers, all  -DO     [place] Level/Cues Needed (Transfer Goal 1, PT)  minimum assist (75% or more patient effort)  -DO     Time Frame (Transfer Goal 1, PT)  long term goal (LTG);1 week  -DO     Progress/Outcome (Transfer Goal 1, PT)  continuing progress toward goal  -DO     Row Name 01/05/19 1002          Gait Training Goal 1 (PT)    Activity/Assistive Device (Gait Training Goal 1, PT)  gait (walking locomotion)  -DO     [place] Level (Gait Training Goal 1, PT)  minimum assist (75% or more patient effort)  -DO     Distance (Gait Goal 1, PT)  50  -DO     Time Frame (Gait Training Goal 1, PT)  long term goal (LTG);1 week  -DO     Progress/Outcome (Gait Training Goal 1, PT)  continuing progress toward goal  -DO     Row Name 01/05/19 1002          Positioning and Restraints    Pre-Treatment Position  in bed  -DO     Post Treatment Position  bed  -DO     In Bed  notified nsg;supine;call light within reach;encouraged to call for assist;exit alarm on;with nsg;RUE elevated;LUE elevated;SCD pump applied;heels elevated  -DO       User Key  (r) = Recorded By, (t) = Taken By, (c) = Cosigned By    Initials Name Provider Type    DO Willis Valadez, PT Physical Therapist        Physical Therapy Education     Title: PT OT SLP Therapies (Done)     Topic: Physical Therapy (Done)     Point: Mobility training (Done)     Learning Progress Summary           Patient Nonacceptance, E,D, VU,NR,NL by DO at 1/5/2019 10:29 AM                   Point: Home exercise program (Done)     Learning Progress Summary           Patient Nonacceptance, E,D, VU,NR,NL by DO at 1/5/2019 10:29 AM                   Point: Body mechanics (Done)     Learning Progress Summary           Patient Nonacceptance, E,D, VU,NR,NL by DO at 1/5/2019 10:29 AM                   Point: Precautions (Done)      Learning Progress Summary           Patient Nonacceptance, E,D, VU,NR,NL by DO at 1/5/2019 10:29 AM                               User Key     Initials Effective Dates Name Provider Type Discipline    DO 03/07/18 -  Willis Valadez, PT Physical Therapist PT              PT Recommendation and Plan  Anticipated Discharge Disposition (PT): skilled nursing facility  Planned Therapy Interventions (PT Eval): balance training, bed mobility training, gait training, strengthening, transfer training  Therapy Frequency (PT Clinical Impression): daily  Outcome Summary/Treatment Plan (PT)  Anticipated Equipment Needs at Discharge (PT): (Unsure at this time due to poor subjective history.)  Anticipated Discharge Disposition (PT): skilled nursing facility  Plan of Care Reviewed With: patient  Outcome Summary: Pt is an 83 y.o. female presenting to PT with impaired mobility, generalized weakness, and decreased activity tolerance following UTI. Her baseline cognition is unclear, but she is very confused at this time and was only oriented to person. Attempted sitting up at the EOB which required MaxAx1 for assistance at the BUE/BLE. Pt able to sit at EOB for approx 1 minutes before requesting to lay back down due to discomfort in the low back. Attempted exercises, however pt became highly agitated with PT and was non-cooperative. Due to poor subjective history it is also unclear her baseline level of mobility. Pt will benefit from continuing skilled PT to improve her independence with functional mobility. PT recommends d/c to SNF pending progress when medically appropriate.   Outcome Measures     Row Name 01/05/19 1000             How much help from another person do you currently need...    Turning from your back to your side while in flat bed without using bedrails?  2  -DO      Moving from lying on back to sitting on the side of a flat bed without bedrails?  2  -DO      Moving to and from a bed to a chair (including a  wheelchair)?  1  -DO      Standing up from a chair using your arms (e.g., wheelchair, bedside chair)?  1  -DO      Climbing 3-5 steps with a railing?  1  -DO      To walk in hospital room?  1  -DO      AM-PAC 6 Clicks Score  8  -DO         Functional Assessment    Outcome Measure Options  AM-PAC 6 Clicks Basic Mobility (PT)  -DO        User Key  (r) = Recorded By, (t) = Taken By, (c) = Cosigned By    Initials Name Provider Type    DO Willis Valadez PT Physical Therapist         Time Calculation:   PT Charges     Row Name 01/05/19 1032             Time Calculation    Start Time  1002  -DO      Stop Time  1016  -DO      Time Calculation (min)  14 min  -DO      PT Received On  01/05/19  -DO      PT - Next Appointment  01/06/19  -DO      PT Goal Re-Cert Due Date  01/12/19  -DO        User Key  (r) = Recorded By, (t) = Taken By, (c) = Cosigned By    Initials Name Provider Type    DO Willis Valadez PT Physical Therapist        Therapy Suggested Charges     Code   Minutes Charges    None           Therapy Charges for Today     Code Description Service Date Service Provider Modifiers Qty    41900654212 HC PT EVAL MOD COMPLEXITY 2 1/5/2019 Willis Valadez, PT GP 1    97926863078 HC PT THER PROC EA 15 MIN 1/5/2019 Willis Valadez, PT GP 1          PT G-Codes  Outcome Measure Options: AM-PAC 6 Clicks Basic Mobility (PT)  AM-PAC 6 Clicks Score: 8      Willis Valadez PT  1/5/2019

## 2019-01-05 NOTE — PLAN OF CARE
Problem: Patient Care Overview  Goal: Plan of Care Review  Outcome: Ongoing (interventions implemented as appropriate)   01/05/19 4871   Coping/Psychosocial   Plan of Care Reviewed With patient   OTHER   Outcome Summary VSS, NS @ 100/HR, SCD, LT FOOT EDEMA, RLE REDNESS, DIET ADVANCED TODAY AFTER SWALLOW EVAL COMPLETED. ATE MORE AT DINNER. WILL ANSWER BASIC QUESTIONS, BUT NOT SURE HOW MUCH COMPREHENSION.   Plan of Care Review   Progress improving     Goal: Individualization and Mutuality  Outcome: Ongoing (interventions implemented as appropriate)    Goal: Discharge Needs Assessment  Outcome: Ongoing (interventions implemented as appropriate)    Goal: Interprofessional Rounds/Family Conf  Outcome: Ongoing (interventions implemented as appropriate)      Problem: Urinary Tract Infection (Adult)  Goal: Signs and Symptoms of Listed Potential Problems Will be Absent, Minimized or Managed (Urinary Tract Infection)  Outcome: Ongoing (interventions implemented as appropriate)    Goal: Signs and Symptoms of Listed Potential Problems Will be Absent, Minimized or Managed (Urinary Tract Infection)  Outcome: Ongoing (interventions implemented as appropriate)      Problem: Fall Risk (Adult)  Goal: Absence of Fall  Outcome: Ongoing (interventions implemented as appropriate)      Problem: Skin Injury Risk (Adult)  Goal: Skin Health and Integrity  Outcome: Ongoing (interventions implemented as appropriate)

## 2019-01-05 NOTE — PLAN OF CARE
Problem: Patient Care Overview  Goal: Plan of Care Review  Outcome: Ongoing (interventions implemented as appropriate)   01/05/19 0417   Coping/Psychosocial   Plan of Care Reviewed With patient   OTHER   Outcome Summary temp max of 99.3. ST to see today. kept NPO. incontinent. pleasantly confused. DNR. bed alarm activated   Plan of Care Review   Progress no change     Goal: Individualization and Mutuality  Outcome: Ongoing (interventions implemented as appropriate)    Goal: Discharge Needs Assessment  Outcome: Ongoing (interventions implemented as appropriate)    Goal: Interprofessional Rounds/Family Conf  Outcome: Ongoing (interventions implemented as appropriate)      Problem: Urinary Tract Infection (Adult)  Goal: Signs and Symptoms of Listed Potential Problems Will be Absent, Minimized or Managed (Urinary Tract Infection)  Outcome: Ongoing (interventions implemented as appropriate)    Goal: Signs and Symptoms of Listed Potential Problems Will be Absent, Minimized or Managed (Urinary Tract Infection)  Outcome: Ongoing (interventions implemented as appropriate)      Problem: Fall Risk (Adult)  Goal: Identify Related Risk Factors and Signs and Symptoms  Outcome: Outcome(s) achieved Date Met: 01/05/19    Goal: Absence of Fall  Outcome: Ongoing (interventions implemented as appropriate)      Problem: Skin Injury Risk (Adult)  Goal: Identify Related Risk Factors and Signs and Symptoms  Outcome: Outcome(s) achieved Date Met: 01/05/19    Goal: Skin Health and Integrity  Outcome: Ongoing (interventions implemented as appropriate)

## 2019-01-06 LAB
ANION GAP SERPL CALCULATED.3IONS-SCNC: 10.1 MMOL/L
BASOPHILS # BLD AUTO: 0.06 10*3/MM3 (ref 0–0.2)
BASOPHILS NFR BLD AUTO: 0.6 % (ref 0–1.5)
BUN BLD-MCNC: 12 MG/DL (ref 8–23)
BUN/CREAT SERPL: 18.5 (ref 7–25)
CALCIUM SPEC-SCNC: 8.7 MG/DL (ref 8.6–10.5)
CHLORIDE SERPL-SCNC: 106 MMOL/L (ref 98–107)
CO2 SERPL-SCNC: 21.9 MMOL/L (ref 22–29)
CREAT BLD-MCNC: 0.65 MG/DL (ref 0.57–1)
DEPRECATED RDW RBC AUTO: 53.3 FL (ref 37–54)
EOSINOPHIL # BLD AUTO: 0.2 10*3/MM3 (ref 0–0.7)
EOSINOPHIL NFR BLD AUTO: 1.9 % (ref 0.3–6.2)
ERYTHROCYTE [DISTWIDTH] IN BLOOD BY AUTOMATED COUNT: 15.1 % (ref 11.7–13)
GFR SERPL CREATININE-BSD FRML MDRD: 87 ML/MIN/1.73
GLUCOSE BLD-MCNC: 97 MG/DL (ref 65–99)
HCT VFR BLD AUTO: 39 % (ref 35.6–45.5)
HGB BLD-MCNC: 13.5 G/DL (ref 11.9–15.5)
IMM GRANULOCYTES # BLD AUTO: 0.05 10*3/MM3 (ref 0–0.03)
IMM GRANULOCYTES NFR BLD AUTO: 0.5 % (ref 0–0.5)
LYMPHOCYTES # BLD AUTO: 2.48 10*3/MM3 (ref 0.9–4.8)
LYMPHOCYTES NFR BLD AUTO: 23.7 % (ref 19.6–45.3)
MCH RBC QN AUTO: 33.3 PG (ref 26.9–32)
MCHC RBC AUTO-ENTMCNC: 34.6 G/DL (ref 32.4–36.3)
MCV RBC AUTO: 96.3 FL (ref 80.5–98.2)
MONOCYTES # BLD AUTO: 2.28 10*3/MM3 (ref 0.2–1.2)
MONOCYTES NFR BLD AUTO: 21.8 % (ref 5–12)
NEUTROPHILS # BLD AUTO: 5.44 10*3/MM3 (ref 1.9–8.1)
NEUTROPHILS NFR BLD AUTO: 52 % (ref 42.7–76)
PLAT MORPH BLD: NORMAL
PLATELET # BLD AUTO: 140 10*3/MM3 (ref 140–500)
PMV BLD AUTO: 10.7 FL (ref 6–12)
POTASSIUM BLD-SCNC: 3.5 MMOL/L (ref 3.5–5.2)
RBC # BLD AUTO: 4.05 10*6/MM3 (ref 3.9–5.2)
RBC MORPH BLD: NORMAL
SODIUM BLD-SCNC: 138 MMOL/L (ref 136–145)
WBC MORPH BLD: NORMAL
WBC NRBC COR # BLD: 10.46 10*3/MM3 (ref 4.5–10.7)

## 2019-01-06 PROCEDURE — 97110 THERAPEUTIC EXERCISES: CPT

## 2019-01-06 PROCEDURE — 85025 COMPLETE CBC W/AUTO DIFF WBC: CPT | Performed by: HOSPITALIST

## 2019-01-06 PROCEDURE — 80048 BASIC METABOLIC PNL TOTAL CA: CPT | Performed by: HOSPITALIST

## 2019-01-06 PROCEDURE — 25010000002 CEFTRIAXONE PER 250 MG: Performed by: HOSPITALIST

## 2019-01-06 PROCEDURE — 85007 BL SMEAR W/DIFF WBC COUNT: CPT | Performed by: HOSPITALIST

## 2019-01-06 RX ADMIN — CEFTRIAXONE SODIUM 1 G: 1 INJECTION, SOLUTION INTRAVENOUS at 10:19

## 2019-01-06 RX ADMIN — LACTULOSE 30 G: 10 SOLUTION ORAL at 16:21

## 2019-01-06 RX ADMIN — METOPROLOL SUCCINATE 12.5 MG: 25 TABLET, FILM COATED, EXTENDED RELEASE ORAL at 09:13

## 2019-01-06 RX ADMIN — APIXABAN 2.5 MG: 2.5 TABLET, FILM COATED ORAL at 21:53

## 2019-01-06 RX ADMIN — METOPROLOL SUCCINATE 12.5 MG: 25 TABLET, FILM COATED, EXTENDED RELEASE ORAL at 21:54

## 2019-01-06 RX ADMIN — SODIUM CHLORIDE 75 ML/HR: 9 INJECTION, SOLUTION INTRAVENOUS at 02:33

## 2019-01-06 RX ADMIN — LACTULOSE 30 G: 10 SOLUTION ORAL at 09:13

## 2019-01-06 RX ADMIN — Medication 1 CAPSULE: at 09:13

## 2019-01-06 RX ADMIN — LATANOPROST 1 DROP: 50 SOLUTION/ DROPS OPHTHALMIC at 21:53

## 2019-01-06 RX ADMIN — LACTULOSE 30 G: 10 SOLUTION ORAL at 21:53

## 2019-01-06 RX ADMIN — SODIUM CHLORIDE 75 ML/HR: 9 INJECTION, SOLUTION INTRAVENOUS at 16:19

## 2019-01-06 RX ADMIN — APIXABAN 2.5 MG: 2.5 TABLET, FILM COATED ORAL at 09:13

## 2019-01-06 RX ADMIN — RIFAXIMIN 550 MG: 550 TABLET ORAL at 21:54

## 2019-01-06 NOTE — THERAPY TREATMENT NOTE
Acute Care - Physical Therapy Treatment Note  [facility]     Patient Name: Dorothy A Ochsner  : 1935  MRN: [id number]  Today's Date: 2019     Date of Referral to PT: 19  Referring Physician: Dr. Montes De Oca    Admit Date: 2019    Visit Dx:    ICD-10-CM ICD-9-CM   1. Altered mental status, unspecified altered mental status type R41.82 780.97   2. Urinary tract infection without hematuria, site unspecified N39.0 599.0   3. Impaired functional mobility, balance, gait, and endurance Z74.09 V49.89   4. Oropharyngeal dysphagia R13.12 787.22     Patient Active Problem List   Diagnosis   • Hepatic encephalopathy (CMS/HCC)   • Cirrhosis of liver (CMS/HCC)   • Atrial fibrillation (CMS/HCC)   • UTI (urinary tract infection)   • Hypertension   • DNR (do not resuscitate)   • Leukocytosis   • Long term current use of anticoagulant   • Alcoholic cirrhosis of liver (CMS/HCC)   • C. difficile colitis   • Altered mental status       Therapy Treatment    Rehabilitation Treatment Summary     Row Name 19 1637             Treatment Time/Intention    Discipline  physical therapy assistant  -      Document Type  therapy note (daily note)  -      Subjective Information  no complaints  -      Care Plan Review  patient/other agree to care plan  -      Comment  pt confused, trying to move around in bed prior to session per nsg nsg in room w/pt on arrival  -      Existing Precautions/Restrictions  fall  -      Recorded by [ILEANA] Alisa Flores, PTA 19 1829      Row Name 19 1637             Bed Mobility Assessment/Treatment    Supine-Sit [place] (Bed Mobility)  2 person assist;moderate assist (50% patient effort);verbal cues;nonverbal cues (demo/gesture)  -      Sit-Supine [place] (Bed Mobility)  2 person assist;maximum assist (25% patient effort)  -      Bed Mobility, Safety Issues  decreased use of arms for pushing/pulling;decreased use of legs for bridging/pushing;impaired trunk  control for bed mobility;cognitive deficits limit understanding  -      Assistive Device (Bed Mobility)  bed rails;draw sheet  -      Comment (Bed Mobility)  pt trying to assist w/exit to R side of bed, wkness limiting  -      Recorded by [] Alisa Flores, [facility] 01/06/19 1829      Row Name 01/06/19 1637             Sit-Stand Transfer    Sit-Stand [place] (Transfers)  2 person assist;moderate assist (50% patient effort);verbal cues;nonverbal cues (demo/gesture) HHA-2, plus R hand on rail, stood x 2 but unable to sidestep  -      Recorded by [] Alisa Flores, [facility] 01/06/19 1829      Row Name 01/06/19 1637             Stand-Sit Transfer    Stand-Sit [place] (Transfers)  2 person assist;minimum assist (75% patient effort);verbal cues;nonverbal cues (demo/gesture)  -      Recorded by [] Alisa Flores, [facility] 01/06/19 1829      Row Name 01/06/19 1637             Therapeutic Exercise    Comment (Therapeutic Exercise)  LAQS EOB x 10 reps, multiple cues  -      Recorded by [] Alisa Flores, [facility] 01/06/19 1829      Row Name 01/06/19 1637             Positioning and Restraints    Pre-Treatment Position  in bed  -JM      In Bed  supine;call light within reach;with nsg pt needed cleaned up from BM  -      Recorded by [] Alisa Flores, [facility] 01/06/19 1829      Row Name 01/06/19 1637             Pain Scale: Word Pre/Post-Treatment    Pain: Word Scale, Pretreatment  0 - no pain  -      Pain: Word Scale, Post-Treatment  0 - no pain  -      Recorded by [] Alisa Flores, [facility] 01/06/19 1829        User Key  (r) = Recorded By, (t) = Taken By, (c) = Cosigned By    Initials Name Effective Dates Discipline    Alisa Duke, [facility] 03/07/18 -  PT                   Physical Therapy Education     Title: PT OT SLP Therapies (In Progress)     Topic: Physical Therapy (In Progress)     Point: Mobility training (In Progress)     Learning Progress Summary           Patient Acceptance, E,D, NR by ILEANA  at 1/6/2019  6:31 PM    Nonacceptance, E, NL by WANG at 1/5/2019  5:44 PM    Comment:  ALTERED MENTAL STATUS R/T UTI    Nonacceptance, E,D, VU,NR,NL by DO at 1/5/2019 10:29 AM                   Point: Home exercise program (In Progress)     Learning Progress Summary           Patient Acceptance, E,D, NR by  at 1/6/2019  6:31 PM    Nonacceptance, E, NL by WANG at 1/5/2019  5:44 PM    Comment:  ALTERED MENTAL STATUS R/T UTI    Nonacceptance, E,D, VU,NR,NL by DO at 1/5/2019 10:29 AM                   Point: Body mechanics (In Progress)     Learning Progress Summary           Patient Acceptance, E,D, NR by ILEANA at 1/6/2019  6:31 PM    Nonacceptance, E, NL by WANG at 1/5/2019  5:44 PM    Comment:  ALTERED MENTAL STATUS R/T UTI    Nonacceptance, E,D, VU,NR,NL by DO at 1/5/2019 10:29 AM                   Point: Precautions (In Progress)     Learning Progress Summary           Patient Acceptance, E,D, NR by ILEANA at 1/6/2019  6:31 PM    Nonacceptance, E, NL by WANG at 1/5/2019  5:44 PM    Comment:  ALTERED MENTAL STATUS R/T UTI    Nonacceptance, E,D, VU,NR,NL by DO at 1/5/2019 10:29 AM                               User Key     Initials Effective Dates Name Provider Type Discipline     03/07/18 -  Alisa Flores, BRENDAN Physical Therapy Assistant PT     06/16/16 -  Anya Tinoco RN Registered Nurse Nurse     03/07/18 -  Willis Valadez, PT Physical Therapist PT                PT Recommendation and Plan     Plan of Care Reviewed With: patient  Progress: improving  Outcome Summary: improved w/supine to sit ,assisting w/rail, some confusion, but if re-directed could stay on task   Outcome Measures     Row Name 01/06/19 1800 01/05/19 1000          How much help from another person do you currently need...    Turning from your back to your side while in flat bed without using bedrails?  3  -  2  -DO     Moving from lying on back to sitting on the side of a flat bed without bedrails?  2  -  2  -DO     Moving to and from a bed  to a chair (including a wheelchair)?  1  -  1  -DO     Standing up from a chair using your arms (e.g., wheelchair, bedside chair)?  2  -  1  -DO     Climbing 3-5 steps with a railing?  1  -  1  -DO     To walk in hospital room?  1  -  1  -DO     AM-PAC 6 Clicks Score  10  -  8  -DO        Functional Assessment    Outcome Measure Options  --  AM-PAC 6 Clicks Basic Mobility (PT)  -DO       User Key  (r) = Recorded By, (t) = Taken By, (c) = Cosigned By    Initials Name Provider Type    Alisa Duke PTA Physical Therapy Assistant    DO Willis Valadez, PT Physical Therapist         Time Calculation:   PT Charges     Row Name 01/06/19 1647             Time Calculation    Start Time  1555  -      Stop Time  1615  -      Time Calculation (min)  20 min  -      PT Received On  01/06/19  -ILEANA      PT - Next Appointment  01/07/19  -ILEANA         Time Calculation- PT    Total Timed Code Minutes- PT  20 minute(s)  -        User Key  (r) = Recorded By, (t) = Taken By, (c) = Cosigned By    Initials Name Provider Type    Alisa Duke PTA Physical Therapy Assistant        Therapy Suggested Charges     Code   Minutes Charges    None           Therapy Charges for Today     Code Description Service Date Service Provider Modifiers Qty    57540426920 HC PT THER PROC EA 15 MIN 1/6/2019 Alisa Flores PTA GP 1          PT G-Codes  Outcome Measure Options: AM-PAC 6 Clicks Basic Mobility (PT)  AM-PAC 6 Clicks Score: 10    Alisa Flores PTA  1/6/2019

## 2019-01-06 NOTE — PROGRESS NOTES
"DAILY PROGRESS NOTE  [facility]    Patient Identification:  Name: Dorothy A [facility]  Age: 83 y.o.  Sex: female  :  1935  MRN: [id number]         Primary Care Physician: Charly Ryder MD    Subjective:  Interval History:She is confused.    Objective:    Scheduled Meds:    apixaban 2.5 mg Oral Q12H   ceftriaxone 1 g Intravenous Q24H   lactobacillus acidophilus 1 capsule Oral Daily   lactulose 30 g Oral TID   latanoprost 1 drop Both Eyes Nightly   metoprolol succinate XL 12.5 mg Oral Q12H   rifAXIMin 550 mg Oral Q12H   sodium chloride 3 mL Intravenous Q12H     Continuous Infusions:    Sodium chloride 75 mL/hr Last Rate: 75 mL/hr (19)       Vital signs in last 24 hours:  Temp:  [96.8 °F (36 °C)-99.4 °F (37.4 °C)] 97.7 °F (36.5 °C)  Heart Rate:  [60-98] 68  Resp:  [18-20] 20  BP: (139-165)/(63-78) 144/70    Intake/Output:    Intake/Output Summary (Last 24 hours) at 2019 1020  Last data filed at 2019 1019  Gross per 24 hour   Intake 1455 ml   Output --   Net 1455 ml       Exam:  /70 (BP Location: Left arm, Patient Position: Lying)   Pulse 68   Temp 97.7 °F (36.5 °C) (Oral)   Resp 20   Ht 157.5 cm (62.01\")   Wt 87.5 kg (193 lb)   SpO2 92%   BMI 35.29 kg/m²     General Appearance:    Confused, no distress   Head:    Normocephalic, without obvious abnormality, atraumatic   Eyes:       Throat:   Lips, tongue, gums normal   Neck:   Supple, symmetrical, trachea midline, no JVD   Lungs:     Clear to auscultation bilaterally, respirations unlabored   Chest Wall:    No tenderness or deformity    Heart:    Regular rate and rhythm, S1 and S2 normal, no murmur,no  Rub or gallop   Abdomen:     Soft, non-tender, bowel sounds active, no masses, no organomegaly    Extremities:   Extremities normal, atraumatic, no cyanosis or edema   Pulses:      Skin:   Skin is warm and dry,  no rashes or palpable lesions   Neurologic:   no focal deficits noted, confused, demented      Lab Results " (last 72 hours)     Procedure Component Value Units Date/Time    Blood Culture - Blood, Arm, Left [[id number]] Collected:  01/04/19 1053    Specimen:  Blood from Arm, Left Updated:  01/05/19 1100     Blood Culture No growth at 24 hours    Blood Culture - Blood, Arm, Left [[id number]] Collected:  01/04/19 0823    Specimen:  Blood from Arm, Left Updated:  01/05/19 0845     Blood Culture No growth at 24 hours    Urine Culture - Urine, Urine, Catheter In/Out [[id number]]  (Abnormal) Collected:  01/04/19 0847    Specimen:  Urine, Catheter In/Out Updated:  01/05/19 0818     Urine Culture >100,000 CFU/mL Gram Negative Bacilli    CBC & Differential [[id number]] Collected:  01/05/19 0542    Specimen:  Blood Updated:  01/05/19 0710    Narrative:       The following orders were created for panel order CBC & Differential.  Procedure                               Abnormality         Status                     ---------                               -----------         ------                     CBC Auto Differential[[id number]]        Abnormal            Final result                 Please view results for these tests on the individual orders.    CBC Auto Differential [[id number]]  (Abnormal) Collected:  01/05/19 0542    Specimen:  Blood Updated:  01/05/19 0710     WBC 6.84 10*3/mm3      RBC 3.49 10*6/mm3      Hemoglobin 11.5 g/dL      Hematocrit 33.7 %      MCV 96.6 fL      MCH 33.0 pg      MCHC 34.1 g/dL      RDW 15.4 %      RDW-SD 54.5 fl      MPV 11.2 fL      Platelets 112 10*3/mm3      Neutrophil % 43.0 %      Lymphocyte % 38.7 %      Monocyte % 15.9 %      Eosinophil % 2.0 %      Basophil % 0.4 %      Immature Grans % 0.3 %      Neutrophils, Absolute 2.93 10*3/mm3      Lymphocytes, Absolute 2.65 10*3/mm3      Monocytes, Absolute 1.09 10*3/mm3      Eosinophils, Absolute 0.14 10*3/mm3      Basophils, Absolute 0.03 10*3/mm3      Immature Grans, Absolute 0.02 10*3/mm3     Basic Metabolic Panel [[id number]]  (Abnormal) Collected:   01/05/19 0542    Specimen:  Blood Updated:  01/05/19 0654     Glucose 78 mg/dL      BUN 18 mg/dL      Creatinine 0.67 mg/dL      Sodium 137 mmol/L      Potassium 3.9 mmol/L      Chloride 106 mmol/L      CO2 23.1 mmol/L      Calcium 8.9 mg/dL      eGFR Non African Amer 84 mL/min/1.73      BUN/Creatinine Ratio 26.9     Anion Gap 7.9 mmol/L     Narrative:       The MDRD GFR formula is only valid for adults with stable renal function between ages 18 and 70.    Ammonia [[id number]]  (Abnormal) Collected:  01/04/19 1701    Specimen:  Blood Updated:  01/04/19 1751     Ammonia 125 umol/L     [place] Draw [[id number]] Collected:  01/04/19 0825    Specimen:  Blood Updated:  01/04/19 0930    Narrative:       The following orders were created for panel order [place] Draw.  Procedure                               Abnormality         Status                     ---------                               -----------         ------                     Light Blue Top[[id number]]                                   Final result               Gold Top - SST[[id number]]                                   Final result                 Please view results for these tests on the individual orders.    Light Blue Top [[id number]] Collected:  01/04/19 0825    Specimen:  Blood Updated:  01/04/19 0930     Extra Tube hold for add-on     Comment: Auto resulted       Gold Top - SST [[id number]] Collected:  01/04/19 0825    Specimen:  Blood Updated:  01/04/19 0930     Extra Tube Hold for add-ons.     Comment: Auto resulted.       Urinalysis, Microscopic Only - Urine, Catheter In/Out [[id number]]  (Abnormal) Collected:  01/04/19 0847    Specimen:  Urine, Catheter In/Out Updated:  01/04/19 0927     RBC, UA 0-2 /HPF      WBC, UA Too Numerous to Count /HPF      Bacteria, UA 4+ /HPF      Squamous Epithelial Cells, UA 0-2 /HPF      Hyaline Casts, UA None Seen /LPF      Methodology Manual Light Microscopy    Influenza Antigen, Rapid - Swab, Nasopharynx [[id number]]   "(Normal) Collected:  01/04/19 0830    Specimen:  Swab from Nasopharynx Updated:  01/04/19 0922     Influenza A Ag, EIA Negative     Influenza B Ag, EIA Negative    Urinalysis With Culture If Indicated - Urine, Catheter In/Out [[id number]]  (Abnormal) Collected:  01/04/19 0847    Specimen:  Urine, Catheter In/Out Updated:  01/04/19 0914     Color, UA Dark Yellow     Appearance, UA Cloudy     pH, UA 6.0     Specific Gravity, UA 1.024     Glucose, UA Negative     Ketones, UA Negative     Bilirubin, UA Negative     Blood, UA Small (1+)     Protein, UA Negative     Leuk Esterase, UA Large (3+)     Nitrite, UA Positive     Urobilinogen, UA 1.0 E.U./dL    Procalcitonin [[id number]]  (Abnormal) Collected:  01/04/19 0823    Specimen:  Blood Updated:  01/04/19 0911     Procalcitonin 0.07 ng/mL     Narrative:       As a Marker for Sepsis (Non-Neonates):   1. <0.5 ng/mL represents a low risk of severe sepsis and/or septic shock.  1. >2 ng/mL represents a high risk of severe sepsis and/or septic shock.    As a Marker for Lower Respiratory Tract Infections that require antibiotic therapy:  PCT on Admission     Antibiotic Therapy             6-12 Hrs later  > 0.5                Strongly Recommended            >0.25 - <0.5         Recommended  0.1 - 0.25           Discouraged                   Remeasure/reassess PCT  <0.1                 Strongly Discouraged          Remeasure/reassess PCT      As 28 day mortality risk marker: \"Change in Procalcitonin Result\" (> 80 % or <=80 %) if Day 0 (or Day 1) and Day 4 values are available. Refer to http://www.[employer]-pct-calculator.com/   Change in PCT <=80 %   A decrease of PCT levels below or equal to 80 % defines a positive change in PCT test result representing a higher risk for 28-day all-cause mortality of patients diagnosed with severe sepsis or septic shock.  Change in PCT > 80 %   A decrease of PCT levels of more than 80 % defines a negative change in PCT result representing a lower " risk for 28-day all-cause mortality of patients diagnosed with severe sepsis or septic shock.                Comprehensive Metabolic Panel [[id number]]  (Abnormal) Collected:  01/04/19 0823    Specimen:  Blood Updated:  01/04/19 0904     Glucose 109 mg/dL      BUN 15 mg/dL      Creatinine 0.72 mg/dL      Sodium 138 mmol/L      Potassium 4.4 mmol/L      Chloride 104 mmol/L      CO2 24.2 mmol/L      Calcium 9.3 mg/dL      Total Protein 7.0 g/dL      Albumin 2.80 g/dL      ALT (SGPT) 14 U/L      AST (SGOT) 31 U/L      Alkaline Phosphatase 149 U/L      Total Bilirubin 2.5 mg/dL      eGFR Non African Amer 77 mL/min/1.73      Globulin 4.2 gm/dL      A/G Ratio 0.7 g/dL      BUN/Creatinine Ratio 20.8     Anion Gap 9.8 mmol/L     Narrative:       The MDRD GFR formula is only valid for adults with stable renal function between ages 18 and 70.    Troponin [[id number]]  (Normal) Collected:  01/04/19 0823    Specimen:  Blood Updated:  01/04/19 0904     Troponin T <0.010 ng/mL     Narrative:       Troponin T Reference Ranges:  Less than 0.03 ng/mL:    Negative for AMI  0.03 to 0.09 ng/mL:      Indeterminant for AMI  Greater than 0.09 ng/mL: Positive for AMI    BNP [[id number]]  (Normal) Collected:  01/04/19 0823    Specimen:  Blood Updated:  01/04/19 0902     proBNP 523.4 pg/mL     Narrative:       Among patients with dyspnea, NT-proBNP is highly sensitive for the detection of acute congestive heart failure. In addition NT-proBNP of <300 pg/ml effectively rules out acute congestive heart failure with 99% negative predictive value.    Lactic Acid, Plasma [[id number]]  (Normal) Collected:  01/04/19 0823    Specimen:  Blood Updated:  01/04/19 0850     Lactate 1.7 mmol/L     CBC & Differential [[id number]] Collected:  01/04/19 0823    Specimen:  Blood Updated:  01/04/19 0842    Narrative:       The following orders were created for panel order CBC & Differential.  Procedure                               Abnormality         Status                      ---------                               -----------         ------                     CBC Auto Differential[[id number]]        Abnormal            Final result                 Please view results for these tests on the individual orders.    CBC Auto Differential [[id number]]  (Abnormal) Collected:  01/04/19 0823    Specimen:  Blood Updated:  01/04/19 0842     WBC 7.87 10*3/mm3      RBC 4.07 10*6/mm3      Hemoglobin 13.6 g/dL      Hematocrit 39.2 %      MCV 96.3 fL      MCH 33.4 pg      MCHC 34.7 g/dL      RDW 15.0 %      RDW-SD 53.0 fl      MPV 11.5 fL      Platelets 143 10*3/mm3      Neutrophil % 77.6 %      Lymphocyte % 13.9 %      Monocyte % 7.5 %      Eosinophil % 0.6 %      Basophil % 0.4 %      Immature Grans % 0.3 %      Neutrophils, Absolute 6.11 10*3/mm3      Lymphocytes, Absolute 1.09 10*3/mm3      Monocytes, Absolute 0.59 10*3/mm3      Eosinophils, Absolute 0.05 10*3/mm3      Basophils, Absolute 0.03 10*3/mm3      Immature Grans, Absolute 0.02 10*3/mm3         Data Review:  Results from last 7 days   Lab Units  01/06/19   0530 01/05/19   0542  01/04/19 0823   SODIUM mmol/L  138  137  138   POTASSIUM mmol/L  3.5  3.9  4.4   CHLORIDE mmol/L  106  106  104   CO2 mmol/L  21.9*  23.1  24.2   BUN mg/dL  12  18  15   CREATININE mg/dL  0.65  0.67  0.72   GLUCOSE mg/dL  97  78  109*   CALCIUM mg/dL  8.7  8.9  9.3     Results from last 7 days   Lab Units  01/06/19   0530  01/05/19   0542  01/04/19   0823   WBC 10*3/mm3  10.46  6.84  7.87   HEMOGLOBIN g/dL  13.5  11.5*  13.6   HEMATOCRIT %  39.0  33.7*  39.2   PLATELETS 10*3/mm3  140  112*  143             Lab Results   Lab Value Date/Time    TROPONINT <0.010 01/04/2019 0823    TROPONINT <0.010 10/26/2017 1143         Results from last 7 days   Lab Units  01/04/19   0823   ALK PHOS U/L  149*   BILIRUBIN mg/dL  2.5*   ALT (SGPT) U/L  14   AST (SGOT) U/L  31             No results found for: POCGLU        Past Medical History:   Diagnosis Date   • Acute  cystitis with hematuria    • Allergic rhinitis    • Aphasia    • Atrial fibrillation (CMS/HCC)    • Cirrhosis of liver (CMS/HCC)    • Encephalopathy    • GERD (gastroesophageal reflux disease)    • Hyperglycemia    • Hypertension    • Malignant neoplasm (CMS/HCC)    • Muscle weakness    • Non-traumatic intracranial hemorrhage (CMS/HCC)    • Osteoarthritis    • Psoriasis    • SVT (supraventricular tachycardia) (CMS/HCC)    • UTI (urinary tract infection)        Assessment:  Active Hospital Problems    Diagnosis Date Noted   • **Altered mental status [R41.82] 01/04/2019   • Leukocytosis [D72.829] 11/02/2017   • Long term current use of anticoagulant [Z79.01] 11/02/2017   • Alcoholic cirrhosis of liver (CMS/HCC) [K70.30] 11/02/2017   • DNR (do not resuscitate) [Z66] 10/27/2017   • UTI (urinary tract infection) [N39.0] 10/26/2017   • Hypertension [I10] 10/26/2017   • Hepatic encephalopathy (CMS/HCC) [K72.90] 10/26/2017      Resolved Hospital Problems   No resolved problems to display.       Plan:  Continue antibiotics and IV fluids. Await cultures.  And Xifaxan for elevated ammonia levels and follow-up lab and repeat ammonia levels.  Discussed with nurse.    Gus Montes De Oca MD  1/6/2019  10:20 AM

## 2019-01-06 NOTE — PLAN OF CARE
Problem: Patient Care Overview  Goal: Plan of Care Review  Outcome: Ongoing (interventions implemented as appropriate)   01/06/19 3064   Coping/Psychosocial   Plan of Care Reviewed With patient   OTHER   Outcome Summary improved w/supine to sit ,assisting w/rail, some confusion, but if re-directed could stay on task    Plan of Care Review   Progress improving

## 2019-01-06 NOTE — PLAN OF CARE
Problem: Patient Care Overview  Goal: Plan of Care Review  Outcome: Ongoing (interventions implemented as appropriate)   01/06/19 1701   Coping/Psychosocial   Plan of Care Reviewed With patient   OTHER   Outcome Summary VSS, LACTULOSE GIVEN x2, BM x1, IV ABX GIVEN AND ABX PO- NEW ORDER, REMAINS CONFUSED, LABS ORDERED FOR 1/7/19   Plan of Care Review   Progress improving     Goal: Individualization and Mutuality  Outcome: Ongoing (interventions implemented as appropriate)    Goal: Discharge Needs Assessment  Outcome: Ongoing (interventions implemented as appropriate)    Goal: Interprofessional Rounds/Family Conf  Outcome: Ongoing (interventions implemented as appropriate)      Problem: Urinary Tract Infection (Adult)  Goal: Signs and Symptoms of Listed Potential Problems Will be Absent, Minimized or Managed (Urinary Tract Infection)  Outcome: Ongoing (interventions implemented as appropriate)      Problem: Fall Risk (Adult)  Goal: Absence of Fall  Outcome: Ongoing (interventions implemented as appropriate)      Problem: Skin Injury Risk (Adult)  Goal: Skin Health and Integrity  Outcome: Ongoing (interventions implemented as appropriate)

## 2019-01-06 NOTE — PLAN OF CARE
Problem: Patient Care Overview  Goal: Plan of Care Review  Outcome: Ongoing (interventions implemented as appropriate)   01/06/19 5105   Coping/Psychosocial   Plan of Care Reviewed With patient   OTHER   Outcome Summary vss. denies pain. pleasantly confuse. tolerated her oral meds well. lactulose given last night. labs ordered this am   Plan of Care Review   Progress improving     Goal: Individualization and Mutuality  Outcome: Ongoing (interventions implemented as appropriate)    Goal: Discharge Needs Assessment  Outcome: Ongoing (interventions implemented as appropriate)    Goal: Interprofessional Rounds/Family Conf  Outcome: Ongoing (interventions implemented as appropriate)      Problem: Urinary Tract Infection (Adult)  Goal: Signs and Symptoms of Listed Potential Problems Will be Absent, Minimized or Managed (Urinary Tract Infection)  Outcome: Ongoing (interventions implemented as appropriate)    Goal: Signs and Symptoms of Listed Potential Problems Will be Absent, Minimized or Managed (Urinary Tract Infection)  Outcome: Ongoing (interventions implemented as appropriate)      Problem: Fall Risk (Adult)  Goal: Absence of Fall  Outcome: Ongoing (interventions implemented as appropriate)      Problem: Skin Injury Risk (Adult)  Goal: Skin Health and Integrity  Outcome: Ongoing (interventions implemented as appropriate)

## 2019-01-07 PROBLEM — E87.6 HYPOKALEMIA: Status: ACTIVE | Noted: 2019-01-07

## 2019-01-07 PROBLEM — N39.0 URINARY TRACT INFECTION DUE TO ESBL KLEBSIELLA: Status: ACTIVE | Noted: 2019-01-07

## 2019-01-07 PROBLEM — B96.89 URINARY TRACT INFECTION DUE TO ESBL KLEBSIELLA: Status: ACTIVE | Noted: 2019-01-07

## 2019-01-07 PROBLEM — G93.41 METABOLIC ENCEPHALOPATHY: Status: ACTIVE | Noted: 2019-01-04

## 2019-01-07 PROBLEM — D72.829 LEUKOCYTOSIS: Status: RESOLVED | Noted: 2017-11-02 | Resolved: 2019-01-07

## 2019-01-07 LAB
ALBUMIN SERPL-MCNC: 2.5 G/DL (ref 3.5–5.2)
ALBUMIN/GLOB SERPL: 0.6 G/DL
ALP SERPL-CCNC: 131 U/L (ref 39–117)
ALT SERPL W P-5'-P-CCNC: 13 U/L (ref 1–33)
AMMONIA BLD-SCNC: 57 UMOL/L (ref 11–51)
ANION GAP SERPL CALCULATED.3IONS-SCNC: 9.8 MMOL/L
AST SERPL-CCNC: 37 U/L (ref 1–32)
BACTERIA SPEC AEROBE CULT: ABNORMAL
BASOPHILS # BLD AUTO: 0.05 10*3/MM3 (ref 0–0.2)
BASOPHILS NFR BLD AUTO: 0.5 % (ref 0–1.5)
BILIRUB SERPL-MCNC: 1.3 MG/DL (ref 0.1–1.2)
BUN BLD-MCNC: 10 MG/DL (ref 8–23)
BUN/CREAT SERPL: 16.7 (ref 7–25)
CALCIUM SPEC-SCNC: 8.7 MG/DL (ref 8.6–10.5)
CHLORIDE SERPL-SCNC: 108 MMOL/L (ref 98–107)
CO2 SERPL-SCNC: 22.2 MMOL/L (ref 22–29)
CREAT BLD-MCNC: 0.6 MG/DL (ref 0.57–1)
DEPRECATED RDW RBC AUTO: 53.7 FL (ref 37–54)
EOSINOPHIL # BLD AUTO: 0.38 10*3/MM3 (ref 0–0.7)
EOSINOPHIL NFR BLD AUTO: 4 % (ref 0.3–6.2)
ERYTHROCYTE [DISTWIDTH] IN BLOOD BY AUTOMATED COUNT: 15.1 % (ref 11.7–13)
GFR SERPL CREATININE-BSD FRML MDRD: 95 ML/MIN/1.73
GLOBULIN UR ELPH-MCNC: 4.2 GM/DL
GLUCOSE BLD-MCNC: 95 MG/DL (ref 65–99)
HCT VFR BLD AUTO: 38.8 % (ref 35.6–45.5)
HGB BLD-MCNC: 13.3 G/DL (ref 11.9–15.5)
IMM GRANULOCYTES # BLD AUTO: 0.03 10*3/MM3 (ref 0–0.03)
IMM GRANULOCYTES NFR BLD AUTO: 0.3 % (ref 0–0.5)
LYMPHOCYTES # BLD AUTO: 2.84 10*3/MM3 (ref 0.9–4.8)
LYMPHOCYTES NFR BLD AUTO: 29.7 % (ref 19.6–45.3)
MCH RBC QN AUTO: 33.2 PG (ref 26.9–32)
MCHC RBC AUTO-ENTMCNC: 34.3 G/DL (ref 32.4–36.3)
MCV RBC AUTO: 96.8 FL (ref 80.5–98.2)
MONOCYTES # BLD AUTO: 1.87 10*3/MM3 (ref 0.2–1.2)
MONOCYTES NFR BLD AUTO: 19.6 % (ref 5–12)
NEUTROPHILS # BLD AUTO: 4.42 10*3/MM3 (ref 1.9–8.1)
NEUTROPHILS NFR BLD AUTO: 46.2 % (ref 42.7–76)
PLATELET # BLD AUTO: 139 10*3/MM3 (ref 140–500)
PMV BLD AUTO: 10.8 FL (ref 6–12)
POTASSIUM BLD-SCNC: 3.3 MMOL/L (ref 3.5–5.2)
PROT SERPL-MCNC: 6.7 G/DL (ref 6–8.5)
RBC # BLD AUTO: 4.01 10*6/MM3 (ref 3.9–5.2)
SODIUM BLD-SCNC: 140 MMOL/L (ref 136–145)
WBC NRBC COR # BLD: 9.56 10*3/MM3 (ref 4.5–10.7)

## 2019-01-07 PROCEDURE — 80053 COMPREHEN METABOLIC PANEL: CPT | Performed by: HOSPITALIST

## 2019-01-07 PROCEDURE — 97110 THERAPEUTIC EXERCISES: CPT

## 2019-01-07 PROCEDURE — 25010000002 ERTAPENEM PER 500 MG: Performed by: HOSPITALIST

## 2019-01-07 PROCEDURE — 82140 ASSAY OF AMMONIA: CPT | Performed by: HOSPITALIST

## 2019-01-07 PROCEDURE — 85025 COMPLETE CBC W/AUTO DIFF WBC: CPT | Performed by: HOSPITALIST

## 2019-01-07 RX ORDER — LACTULOSE 10 G/15ML
30 SOLUTION ORAL DAILY
Status: DISCONTINUED | OUTPATIENT
Start: 2019-01-08 | End: 2019-01-08

## 2019-01-07 RX ORDER — POTASSIUM CHLORIDE 750 MG/1
40 CAPSULE, EXTENDED RELEASE ORAL EVERY 4 HOURS
Status: COMPLETED | OUTPATIENT
Start: 2019-01-07 | End: 2019-01-07

## 2019-01-07 RX ADMIN — POTASSIUM CHLORIDE 40 MEQ: 750 CAPSULE, EXTENDED RELEASE ORAL at 12:43

## 2019-01-07 RX ADMIN — APIXABAN 2.5 MG: 2.5 TABLET, FILM COATED ORAL at 09:12

## 2019-01-07 RX ADMIN — APIXABAN 2.5 MG: 2.5 TABLET, FILM COATED ORAL at 21:27

## 2019-01-07 RX ADMIN — SODIUM CHLORIDE, PRESERVATIVE FREE 3 ML: 5 INJECTION INTRAVENOUS at 21:27

## 2019-01-07 RX ADMIN — ERTAPENEM SODIUM 1 G: 1 INJECTION, POWDER, LYOPHILIZED, FOR SOLUTION INTRAMUSCULAR; INTRAVENOUS at 12:43

## 2019-01-07 RX ADMIN — LATANOPROST 1 DROP: 50 SOLUTION/ DROPS OPHTHALMIC at 21:34

## 2019-01-07 RX ADMIN — POTASSIUM CHLORIDE 40 MEQ: 750 CAPSULE, EXTENDED RELEASE ORAL at 17:51

## 2019-01-07 RX ADMIN — LACTULOSE 30 G: 10 SOLUTION ORAL at 09:16

## 2019-01-07 RX ADMIN — RIFAXIMIN 550 MG: 550 TABLET ORAL at 21:27

## 2019-01-07 RX ADMIN — METOPROLOL SUCCINATE 12.5 MG: 25 TABLET, FILM COATED, EXTENDED RELEASE ORAL at 09:12

## 2019-01-07 RX ADMIN — RIFAXIMIN 550 MG: 550 TABLET ORAL at 09:12

## 2019-01-07 RX ADMIN — SODIUM CHLORIDE 75 ML/HR: 9 INJECTION, SOLUTION INTRAVENOUS at 06:31

## 2019-01-07 RX ADMIN — METOPROLOL SUCCINATE 12.5 MG: 25 TABLET, FILM COATED, EXTENDED RELEASE ORAL at 21:27

## 2019-01-07 RX ADMIN — Medication 1 CAPSULE: at 09:12

## 2019-01-07 NOTE — THERAPY TREATMENT NOTE
Acute Care - Physical Therapy Treatment Note  [facility]     Patient Name: Dorothy A Ochsner  : 1935  MRN: [id number]  Today's Date: 2019     Date of Referral to PT: 19  Referring Physician: Dr. Montes De Oca    Admit Date: 2019    Visit Dx:    ICD-10-CM ICD-9-CM   1. Altered mental status, unspecified altered mental status type R41.82 780.97   2. Urinary tract infection without hematuria, site unspecified N39.0 599.0   3. Impaired functional mobility, balance, gait, and endurance Z74.09 V49.89   4. Oropharyngeal dysphagia R13.12 787.22     Patient Active Problem List   Diagnosis   • Hepatic encephalopathy (CMS/HCC)   • Cirrhosis of liver (CMS/HCC)   • Atrial fibrillation (CMS/HCC)   • Hypertension   • DNR (do not resuscitate)   • Long term current use of anticoagulant   • Alcoholic cirrhosis of liver (CMS/HCC)   • C. difficile colitis   • Metabolic encephalopathy   • Urinary tract infection due to ESBL Klebsiella   • Hypokalemia       Therapy Treatment    Rehabilitation Treatment Summary     Row Name 19 1600             Treatment Time/Intention    Discipline  physical therapy assistant  -CW      Document Type  therapy note (daily note)  -CW      Subjective Information  complains of;weakness  -CW      Mode of Treatment  physical therapy  -CW      Therapy Frequency (PT Clinical Impression)  daily  -CW      Patient Effort  fair  -CW      Existing Precautions/Restrictions  fall  -CW      Treatment Considerations/Comments  confusion  -CW      Recorded by [CW] Jose Urrutia PTA 19 1618      Row Name 19 1600             Vital Signs    O2 Delivery Pre Treatment  room air  -CW      Recorded by [CW] Jose Urrutia PTA 19 1618      Row Name 19 1600             Cognitive Assessment/Intervention- PT/OT    Orientation Status (Cognition)  oriented to;person  -CW      Follows Commands (Cognition)  follows one step commands;25-49% accuracy;delayed  response/completion;physical/tactile prompts required;repetition of directions required;verbal cues/prompting required  -CW      Safety Deficit (Cognitive)  severe deficit;awareness of need for assistance;insight into deficits/self awareness;safety precautions follow-through/compliance  -CW      Personal Safety Interventions  fall prevention program maintained;muscle strengthening facilitated;nonskid shoes/slippers when out of bed  -CW      Recorded by [CW] Jose Urrutia, PTA 01/07/19 1618      Row Name 01/07/19 1600             Bed Mobility Assessment/Treatment    Bed Mobility Assessment/Treatment  supine-sit;sit-supine  -CW      Supine-Sit [place] (Bed Mobility)  2 person assist;moderate assist (50% patient effort);verbal cues;nonverbal cues (demo/gesture)  -CW      Sit-Supine [place] (Bed Mobility)  2 person assist;maximum assist (25% patient effort)  -CW      Bed Mobility, Safety Issues  decreased use of arms for pushing/pulling;decreased use of legs for bridging/pushing;impaired trunk control for bed mobility;cognitive deficits limit understanding  -CW      Assistive Device (Bed Mobility)  bed rails;draw sheet  -CW      Recorded by [CW] Jose Urrutia, PTA 01/07/19 1618      Row Name 01/07/19 1600             Transfer Assessment/Treatment    Transfer Assessment/Treatment  sit-stand transfer;stand-sit transfer  -CW      Recorded by [CW] Jose Urrutia, PTA 01/07/19 1618      Row Name 01/07/19 1600             Sit-Stand Transfer    Sit-Stand [place] (Transfers)  2 person assist;moderate assist (50% patient effort);verbal cues;nonverbal cues (demo/gesture) HHA-2, plus R hand on rail, stood x 2 but unable to sidestep  -CW      Recorded by [CW] Jose Urrutia, PTA 01/07/19 1618      Row Name 01/07/19 1600             Stand-Sit Transfer    Stand-Sit [place] (Transfers)  2 person assist;minimum assist (75% patient effort);verbal cues;nonverbal cues (demo/gesture)  -CW       Recorded by [CW] Jose Urrutia, PTA 01/07/19 1618      Row Name 01/07/19 1600             Positioning and Restraints    Pre-Treatment Position  in bed  -CW      Post Treatment Position  bed  -CW      In Bed  notified nsg;supine;call light within reach;encouraged to call for assist  -CW      Recorded by [CW] Jose Urrutia, PTA 01/07/19 1618      Row Name 01/07/19 1600             Pain Scale: Word Pre/Post-Treatment    Pain: Word Scale, Pretreatment  0 - no pain  -CW      Pain: Word Scale, Post-Treatment  0 - no pain  -CW      Recorded by [CW] Jose Urrutia, PTA 01/07/19 1618      Row Name 01/07/19 1600             Outcome Summary/Treatment Plan (PT)    Anticipated Discharge Disposition (PT)  skilled nursing facility  -CW      Recorded by [CW] Jose Urrutia, PTA 01/07/19 1618        User Key  (r) = Recorded By, (t) = Taken By, (c) = Cosigned By    Initials Name Effective Dates Discipline    CW Jose Urrutia, PTA 03/07/18 -  PT                   Physical Therapy Education     Title: PT OT SLP Therapies (Done)     Topic: Physical Therapy (Done)     Point: Mobility training (Done)     Learning Progress Summary           Patient Acceptance, E,TB, VU,DU by ELLY at 1/7/2019  4:18 PM    Acceptance, E,D, NR by ILEANA at 1/6/2019  6:31 PM    Nonacceptance, E, NL by WANG at 1/5/2019  5:44 PM    Comment:  ALTERED MENTAL STATUS R/T UTI    Nonacceptance, E,D, VU,NR,NL by  at 1/5/2019 10:29 AM                   Point: Home exercise program (Done)     Learning Progress Summary           Patient Acceptance, E,TB, VU,DU by ELLY at 1/7/2019  4:18 PM    Acceptance, E,D, NR by ILEANA at 1/6/2019  6:31 PM    Nonacceptance, E, NL by WANG at 1/5/2019  5:44 PM    Comment:  ALTERED MENTAL STATUS R/T UTI    Nonacceptance, E,D, VU,NR,NL by  at 1/5/2019 10:29 AM                   Point: Body mechanics (Done)     Learning Progress Summary           Patient Acceptance, E,TB, VU,DU by ELLY at 1/7/2019  4:18 PM    Acceptance, E,D, NR  by  at 1/6/2019  6:31 PM    Nonacceptance, E, NL by WANG at 1/5/2019  5:44 PM    Comment:  ALTERED MENTAL STATUS R/T UTI    Nonacceptance, E,D, VU,NR,NL by DO at 1/5/2019 10:29 AM                   Point: Precautions (Done)     Learning Progress Summary           Patient Acceptance, E,TB, VU,DU by CW at 1/7/2019  4:18 PM    Acceptance, E,D, NR by  at 1/6/2019  6:31 PM    Nonacceptance, E, NL by WANG at 1/5/2019  5:44 PM    Comment:  ALTERED MENTAL STATUS R/T UTI    Nonacceptance, E,D, VU,NR,NL by DO at 1/5/2019 10:29 AM                               User Key     Initials Effective Dates Name Provider Type Discipline     03/07/18 -  Alisa Flores, PTA Physical Therapy Assistant PT    WANG 06/16/16 -  Anya Tinoco RN Registered Nurse Nurse    DO 03/07/18 -  Wlilis Valadez, PT Physical Therapist PT     03/07/18 -  Jose Urrutia PTA Physical Therapy Assistant PT                PT Recommendation and Plan  Anticipated Discharge Disposition (PT): skilled nursing facility  Therapy Frequency (PT Clinical Impression): daily  Outcome Summary/Treatment Plan (PT)  Anticipated Discharge Disposition (PT): skilled nursing facility  Plan of Care Reviewed With: patient  Progress: improving  Outcome Summary: Pt increased with activity tolerance but pt still confused and requiring redirection with ther ex  Outcome Measures     Row Name 01/07/19 1600 01/06/19 1800 01/05/19 1000       How much help from another person do you currently need...    Turning from your back to your side while in flat bed without using bedrails?  2  -CW  3  -JM  2  -DO    Moving from lying on back to sitting on the side of a flat bed without bedrails?  2  -CW  2  -JM  2  -DO    Moving to and from a bed to a chair (including a wheelchair)?  1  -CW  1  -JM  1  -DO    Standing up from a chair using your arms (e.g., wheelchair, bedside chair)?  1  -CW  2  -JM  1  -DO    Climbing 3-5 steps with a railing?  1  -CW  1  -JM  1  -DO    To walk in  hospital room?  1  -CW  1  -JM  1  -DO    AM-PAC 6 Clicks Score  8  -CW  10  -JM  8  -DO       Functional Assessment    Outcome Measure Options  AM-PAC 6 Clicks Basic Mobility (PT)  -CW  --  AM-PAC 6 Clicks Basic Mobility (PT)  -DO      User Key  (r) = Recorded By, (t) = Taken By, (c) = Cosigned By    Initials Name Provider Type    Alisa Duke, BRENDAN Physical Therapy Assistant    DO Willis Valadez, PT Physical Therapist    Jose Coronado PTA Physical Therapy Assistant         Time Calculation:   PT Charges     Row Name 01/07/19 1621             Time Calculation    Start Time  1604  -CW      Stop Time  1621  -CW      Time Calculation (min)  17 min  -CW      PT Received On  01/07/19  -CW      PT - Next Appointment  01/08/19  -CW        User Key  (r) = Recorded By, (t) = Taken By, (c) = Cosigned By    Initials Name Provider Type    Jose Coronado PTA Physical Therapy Assistant        Therapy Suggested Charges     Code   Minutes Charges    None           Therapy Charges for Today     Code Description Service Date Service Provider Modifiers Qty    00462058583 HC PT THER PROC EA 15 MIN 1/7/2019 Jose Urrutia PTA GP 1          PT G-Codes  Outcome Measure Options: AM-PAC 6 Clicks Basic Mobility (PT)  AM-PAC 6 Clicks Score: 8    Jose Urrutia PTA  1/7/2019

## 2019-01-07 NOTE — PLAN OF CARE
Problem: Patient Care Overview  Goal: Plan of Care Review  Outcome: Ongoing (interventions implemented as appropriate)   01/07/19 0325   Coping/Psychosocial   Plan of Care Reviewed With patient   OTHER   Outcome Summary b/l foot edema > left foot, small reddened area on r lower leg, excoriation under breasts B/L, inc of urine, isolation initiated due to EBSL in urine, cooperative at times other times not, oriented to self only, VSS, turned q 2, inc care done. Consent for PICC per stef Espinal signed & on chart     Goal: Individualization and Mutuality  Outcome: Ongoing (interventions implemented as appropriate)    Goal: Discharge Needs Assessment  Outcome: Ongoing (interventions implemented as appropriate)    Goal: Interprofessional Rounds/Family Conf  Outcome: Ongoing (interventions implemented as appropriate)      Problem: Urinary Tract Infection (Adult)  Goal: Signs and Symptoms of Listed Potential Problems Will be Absent, Minimized or Managed (Urinary Tract Infection)  Outcome: Ongoing (interventions implemented as appropriate)    Goal: Signs and Symptoms of Listed Potential Problems Will be Absent, Minimized or Managed (Urinary Tract Infection)  Outcome: Ongoing (interventions implemented as appropriate)      Problem: Fall Risk (Adult)  Goal: Absence of Fall  Outcome: Ongoing (interventions implemented as appropriate)      Problem: Skin Injury Risk (Adult)  Goal: Skin Health and Integrity  Outcome: Ongoing (interventions implemented as appropriate)

## 2019-01-07 NOTE — PROGRESS NOTES
"    DAILY PROGRESS NOTE  [facility]    Patient Identification:  Name: Dorothy A Ochsner  Age: 83 y.o.  Sex: female  :  1935  MRN: [id number]         Primary Care Physician: Charly Ryder MD    Subjective:  Interval History: more awake and interactive but still quite encephalopathic - knows name - aid at bedside - no diarrhea - Hx/ROS unreliable     Objective:no fm at bs     Scheduled Meds:  apixaban 2.5 mg Oral Q12H   ertapenem 1 g Intravenous Q24H   lactobacillus acidophilus 1 capsule Oral Daily   lactulose 30 g Oral TID   latanoprost 1 drop Both Eyes Nightly   metoprolol succinate XL 12.5 mg Oral Q12H   potassium chloride 40 mEq Oral Q4H   rifAXIMin 550 mg Oral Q12H   sodium chloride 3 mL Intravenous Q12H     Continuous Infusions:     Vital signs in last 24 hours:  Temp:  [97 °F (36.1 °C)-98.6 °F (37 °C)] 98 °F (36.7 °C)  Heart Rate:  [62-76] 70  Resp:  [18-20] 18  BP: (126-154)/(53-70) 140/60    Intake/Output:    Intake/Output Summary (Last 24 hours) at 2019 1028  Last data filed at 2019 0857  Gross per 24 hour   Intake 2495 ml   Output --   Net 2495 ml       Exam:  /60 (BP Location: Right arm, Patient Position: Lying)   Pulse 70   Temp 98 °F (36.7 °C) (Oral)   Resp 18   Ht 157.5 cm (62.01\")   Wt 87.5 kg (193 lb)   SpO2 95%   BMI 35.29 kg/m²     General Appearance:    Alerts, cooperative, no distress, ill                         Throat:   Lips, tongue, gums normal; oral mucosa pink and moist                           Neck:   Supple, symmetrical, trachea midline, no JVD                         Lungs:    Clear to auscultation bilaterally, respirations unlabored                          Heart:    Regular rate and rhythm, S1 and S2 normal                 Abdomen:     Soft, non-tender, bowel sounds active                 Extremities:   Chronic changes, no cyanosis                        Pulses:   Pulses palpable in all extremities                  Neurologic:   CNII-XII " intact, moving all      Data Review:  Labs in chart were reviewed.    Assessment:  Active Hospital Problems    Diagnosis Date Noted   • **Urinary tract infection due to ESBL Klebsiella [N39.0, B96.89] 01/07/2019   • Hypokalemia [E87.6] 01/07/2019   • Metabolic encephalopathy [G93.41] 01/04/2019   • Long term current use of anticoagulant [Z79.01] 11/02/2017   • Alcoholic cirrhosis of liver (CMS/HCC) [K70.30] 11/02/2017   • DNR (do not resuscitate) [Z66] 10/27/2017   • Hypertension [I10] 10/26/2017   • Hepatic encephalopathy (CMS/HCC) [K72.90] 10/26/2017      Resolved Hospital Problems    Diagnosis Date Noted Date Resolved   • Leukocytosis [D72.829] 11/02/2017 01/07/2019       Plan:  UTI secondary to ESBL - change Rocephin to Invanz   -on probiotic given phx Cdiff - no diarrhea     Replace K - check Mg - off lasix - SLIVF as ST advanced diet     Continue Xifaxin and monitor NH3 which has improved and decrease to daily given phx cdiff     Encephalopathy secondary to UTI/Cirrhosis     PAF - RC - Eliquis     Dispo - [facility]GTD - place PICC tomorrow as tentative plan - will need 14 day duration in total - CCP for DC needs    Max Hernandez MD  1/7/2019  10:28 AM

## 2019-01-07 NOTE — PLAN OF CARE
Problem: Patient Care Overview  Goal: Plan of Care Review  Outcome: Ongoing (interventions implemented as appropriate)   01/07/19 1582   Coping/Psychosocial   Plan of Care Reviewed With patient   OTHER   Outcome Summary Pt increased with activity tolerance but pt still confused and requiring redirection with ther ex   Plan of Care Review   Progress improving

## 2019-01-07 NOTE — PLAN OF CARE
Problem: Patient Care Overview  Goal: Plan of Care Review  Outcome: Ongoing (interventions implemented as appropriate)   01/07/19 0604   OTHER   Outcome Summary alert to self and aware of some situations, but still confused, incont, ivf, both feet very edematous, some reddness rle   Plan of Care Review   Progress improving     Goal: Discharge Needs Assessment  Outcome: Ongoing (interventions implemented as appropriate)    Goal: Interprofessional Rounds/Family Conf  Outcome: Ongoing (interventions implemented as appropriate)      Problem: Urinary Tract Infection (Adult)  Goal: Signs and Symptoms of Listed Potential Problems Will be Absent, Minimized or Managed (Urinary Tract Infection)  Outcome: Ongoing (interventions implemented as appropriate)    Goal: Signs and Symptoms of Listed Potential Problems Will be Absent, Minimized or Managed (Urinary Tract Infection)  Outcome: Ongoing (interventions implemented as appropriate)      Problem: Fall Risk (Adult)  Goal: Absence of Fall  Outcome: Ongoing (interventions implemented as appropriate)      Problem: Skin Injury Risk (Adult)  Goal: Skin Health and Integrity  Outcome: Ongoing (interventions implemented as appropriate)

## 2019-01-08 LAB
AMMONIA BLD-SCNC: 59 UMOL/L (ref 11–51)
ANION GAP SERPL CALCULATED.3IONS-SCNC: 8.5 MMOL/L
BUN BLD-MCNC: 10 MG/DL (ref 8–23)
BUN/CREAT SERPL: 16.7 (ref 7–25)
CALCIUM SPEC-SCNC: 8.7 MG/DL (ref 8.6–10.5)
CHLORIDE SERPL-SCNC: 105 MMOL/L (ref 98–107)
CO2 SERPL-SCNC: 22.5 MMOL/L (ref 22–29)
CREAT BLD-MCNC: 0.6 MG/DL (ref 0.57–1)
GFR SERPL CREATININE-BSD FRML MDRD: 95 ML/MIN/1.73
GLUCOSE BLD-MCNC: 73 MG/DL (ref 65–99)
GLUCOSE BLDC GLUCOMTR-MCNC: 86 MG/DL (ref 70–130)
MAGNESIUM SERPL-MCNC: 2 MG/DL (ref 1.6–2.4)
POTASSIUM BLD-SCNC: 4.1 MMOL/L (ref 3.5–5.2)
SODIUM BLD-SCNC: 136 MMOL/L (ref 136–145)

## 2019-01-08 PROCEDURE — 82140 ASSAY OF AMMONIA: CPT | Performed by: HOSPITALIST

## 2019-01-08 PROCEDURE — 25010000002 ERTAPENEM PER 500 MG: Performed by: HOSPITALIST

## 2019-01-08 PROCEDURE — 97110 THERAPEUTIC EXERCISES: CPT

## 2019-01-08 PROCEDURE — 80048 BASIC METABOLIC PNL TOTAL CA: CPT | Performed by: HOSPITALIST

## 2019-01-08 PROCEDURE — 83735 ASSAY OF MAGNESIUM: CPT | Performed by: HOSPITALIST

## 2019-01-08 PROCEDURE — C1751 CATH, INF, PER/CENT/MIDLINE: HCPCS

## 2019-01-08 PROCEDURE — 82962 GLUCOSE BLOOD TEST: CPT

## 2019-01-08 PROCEDURE — 02HV33Z INSERTION OF INFUSION DEVICE INTO SUPERIOR VENA CAVA, PERCUTANEOUS APPROACH: ICD-10-PCS | Performed by: HOSPITALIST

## 2019-01-08 RX ORDER — FUROSEMIDE 40 MG/1
40 TABLET ORAL DAILY
Status: DISCONTINUED | OUTPATIENT
Start: 2019-01-09 | End: 2019-01-09 | Stop reason: HOSPADM

## 2019-01-08 RX ORDER — SODIUM CHLORIDE 0.9 % (FLUSH) 0.9 %
10 SYRINGE (ML) INJECTION AS NEEDED
Status: DISCONTINUED | OUTPATIENT
Start: 2019-01-08 | End: 2019-01-09 | Stop reason: HOSPADM

## 2019-01-08 RX ORDER — LACTULOSE 10 G/15ML
30 SOLUTION ORAL 2 TIMES DAILY
Status: DISCONTINUED | OUTPATIENT
Start: 2019-01-08 | End: 2019-01-09 | Stop reason: HOSPADM

## 2019-01-08 RX ORDER — POTASSIUM CHLORIDE 750 MG/1
20 CAPSULE, EXTENDED RELEASE ORAL DAILY
Status: DISCONTINUED | OUTPATIENT
Start: 2019-01-09 | End: 2019-01-09 | Stop reason: HOSPADM

## 2019-01-08 RX ORDER — SODIUM CHLORIDE 0.9 % (FLUSH) 0.9 %
10 SYRINGE (ML) INJECTION EVERY 12 HOURS SCHEDULED
Status: DISCONTINUED | OUTPATIENT
Start: 2019-01-08 | End: 2019-01-09 | Stop reason: HOSPADM

## 2019-01-08 RX ORDER — SODIUM CHLORIDE 0.9 % (FLUSH) 0.9 %
20 SYRINGE (ML) INJECTION AS NEEDED
Status: DISCONTINUED | OUTPATIENT
Start: 2019-01-08 | End: 2019-01-09 | Stop reason: HOSPADM

## 2019-01-08 RX ADMIN — SODIUM CHLORIDE, PRESERVATIVE FREE 10 ML: 5 INJECTION INTRAVENOUS at 21:25

## 2019-01-08 RX ADMIN — SODIUM CHLORIDE, PRESERVATIVE FREE 3 ML: 5 INJECTION INTRAVENOUS at 08:32

## 2019-01-08 RX ADMIN — Medication 1 CAPSULE: at 11:57

## 2019-01-08 RX ADMIN — LACTULOSE 30 G: 10 SOLUTION ORAL at 11:56

## 2019-01-08 RX ADMIN — SODIUM CHLORIDE, PRESERVATIVE FREE 10 ML: 5 INJECTION INTRAVENOUS at 12:04

## 2019-01-08 RX ADMIN — APIXABAN 2.5 MG: 2.5 TABLET, FILM COATED ORAL at 11:57

## 2019-01-08 RX ADMIN — RIFAXIMIN 550 MG: 550 TABLET ORAL at 11:57

## 2019-01-08 RX ADMIN — APIXABAN 2.5 MG: 2.5 TABLET, FILM COATED ORAL at 21:24

## 2019-01-08 RX ADMIN — RIFAXIMIN 550 MG: 550 TABLET ORAL at 21:24

## 2019-01-08 RX ADMIN — LATANOPROST 1 DROP: 50 SOLUTION/ DROPS OPHTHALMIC at 21:24

## 2019-01-08 RX ADMIN — METOPROLOL SUCCINATE 12.5 MG: 25 TABLET, FILM COATED, EXTENDED RELEASE ORAL at 21:24

## 2019-01-08 RX ADMIN — METOPROLOL SUCCINATE 12.5 MG: 25 TABLET, FILM COATED, EXTENDED RELEASE ORAL at 11:56

## 2019-01-08 RX ADMIN — LACTULOSE 30 G: 10 SOLUTION ORAL at 21:24

## 2019-01-08 RX ADMIN — ERTAPENEM SODIUM 1 G: 1 INJECTION, POWDER, LYOPHILIZED, FOR SOLUTION INTRAMUSCULAR; INTRAVENOUS at 11:53

## 2019-01-08 NOTE — PLAN OF CARE
Problem: Patient Care Overview  Goal: Plan of Care Review  Outcome: Ongoing (interventions implemented as appropriate)   01/08/19 5722   Coping/Psychosocial   Plan of Care Reviewed With patient   OTHER   Outcome Summary Pt continues to have confusion impacting safety with bed mobility and other transfers.    Plan of Care Review   Progress no change

## 2019-01-08 NOTE — PLAN OF CARE
Problem: Patient Care Overview  Goal: Plan of Care Review  Outcome: Ongoing (interventions implemented as appropriate)   01/08/19 3963   Coping/Psychosocial   Plan of Care Reviewed With patient   OTHER   Outcome Summary afebrile . no c/o pain. incontinent of urine. Remedy cream to skin folds, tawana breast folds   bed alarm in use for safety. alert, pleasant, cooperative but confused to place, time, situation. ble edema; left greater than right.    Plan of Care Review   Progress no change     Goal: Individualization and Mutuality  Outcome: Ongoing (interventions implemented as appropriate)    Goal: Discharge Needs Assessment  Outcome: Ongoing (interventions implemented as appropriate)    Goal: Interprofessional Rounds/Family Conf  Outcome: Ongoing (interventions implemented as appropriate)      Problem: Urinary Tract Infection (Adult)  Goal: Signs and Symptoms of Listed Potential Problems Will be Absent, Minimized or Managed (Urinary Tract Infection)  Outcome: Ongoing (interventions implemented as appropriate)    Goal: Signs and Symptoms of Listed Potential Problems Will be Absent, Minimized or Managed (Urinary Tract Infection)  Outcome: Ongoing (interventions implemented as appropriate)      Problem: Fall Risk (Adult)  Goal: Absence of Fall  Outcome: Ongoing (interventions implemented as appropriate)      Problem: Skin Injury Risk (Adult)  Goal: Skin Health and Integrity  Outcome: Ongoing (interventions implemented as appropriate)

## 2019-01-08 NOTE — PROGRESS NOTES
"Discharge Planning Assessment  [facility]     Patient Name: Dorothy A Ochsner  MRN: [id number]  Today's Date: 1/8/2019    Admit Date: 1/4/2019    Discharge Needs Assessment    No documentation.       Discharge Plan     Row Name 01/08/19 1117       Plan    Plan  Return to [facility], where she lives  long term care     Plan Comments  Se with [facility] updated that pt will need a total of 14 days of IV abx (ertapenem), she said \"not a problem\".  Amanda Hunt RN        Destination - Selection Complete      Service Provider Request Status Selected Services Address Phone Number Fax Number    [facility] Selected Intermediate Care [facility] [facility] [address] [phone]         Amanda Hunt RN    "

## 2019-01-08 NOTE — PROGRESS NOTES
"    DAILY PROGRESS NOTE  [facility]    Patient Identification:  Name: Dorothy A Ochsner  Age: 83 y.o.  Sex: female  :  1935  MRN: [id number]         Primary Care Physician: Charly Ryder MD    Subjective:  Interval History: quite rude/mean in her speech today but o/w clinically looks much better - denies n/v/d/cp/sob    Objective:    Scheduled Meds:  apixaban 2.5 mg Oral Q12H   ertapenem 1 g Intravenous Q24H   lactobacillus acidophilus 1 capsule Oral Daily   lactulose 30 g Oral Daily   latanoprost 1 drop Both Eyes Nightly   metoprolol succinate XL 12.5 mg Oral Q12H   rifAXIMin 550 mg Oral Q12H   sodium chloride 10 mL Intravenous Q12H   sodium chloride 3 mL Intravenous Q12H     Continuous Infusions:     Vital signs in last 24 hours:  Temp:  [98 °F (36.7 °C)-98.7 °F (37.1 °C)] 98.2 °F (36.8 °C)  Heart Rate:  [56-71] 66  Resp:  [18] 18  BP: (138-155)/(62-84) 138/64    Intake/Output:    Intake/Output Summary (Last 24 hours) at 2019 1612  Last data filed at 2019 1300  Gross per 24 hour   Intake 1328 ml   Output --   Net 1328 ml       Exam:  /64 (BP Location: Left arm, Patient Position: Lying)   Pulse 66   Temp 98.2 °F (36.8 °C) (Oral)   Resp 18   Ht 157.5 cm (62.01\")   Wt 87.5 kg (193 lb)   SpO2 94%   BMI 35.29 kg/m²     General Appearance:    Alert, cooperative, no distress, more bright eyed and conversational but her conversation if full of fire                           Head:    Normocephalic, without obvious abnormality, atraumatic                           Eyes:    PERRL, conjunctiva/corneas clear, EOM's intact, both eyes                         Throat:   Lips, tongue, gums normal; oral mucosa pink and moist                           Neck:   Supple, symmetrical, trachea midline, no JVD                         Lungs:    Clear to auscultation bilaterally, respirations unlabored                          Heart:    Regular rate and rhythm, S1 and S2 normal                  " Abdomen:     Soft, non-tender, bowel sounds active                 Extremities:   RUE PICC, chronic changes w/ stable edema                        Pulses:   Pulses palpable in lower extremities                  Neurologic:   CNII-XII intact, moving all w/out focal deficits noted     Data Review:  Labs in chart were reviewed.    Assessment:  Active Hospital Problems    Diagnosis Date Noted   • **Urinary tract infection due to ESBL Klebsiella [N39.0, B96.89] 01/07/2019   • Hypokalemia [E87.6] 01/07/2019   • Metabolic encephalopathy [G93.41] 01/04/2019   • Long term current use of anticoagulant [Z79.01] 11/02/2017   • Alcoholic cirrhosis of liver (CMS/HCC) [K70.30] 11/02/2017   • DNR (do not resuscitate) [Z66] 10/27/2017   • Hypertension [I10] 10/26/2017   • Hepatic encephalopathy (CMS/HCC) [K72.90] 10/26/2017      Resolved Hospital Problems    Diagnosis Date Noted Date Resolved   • Leukocytosis [D72.829] 11/02/2017 01/07/2019       Plan:  UTI secondary to ESBL - change Rocephin to Invanz              -on probiotic given phx Cdiff - no diarrhea      Replaced K - 2.0 Mg - resume Lasix in am      Continue Xifaxin and monitor NH3 which has improved and increase to twice daily      Encephalopathy secondary to UTI/Cirrhosis - resolving      PAF - RC - Eliquis      Dispo - [facility]GTD - back to [address] tomorrow     Max Hernandez MD  1/8/2019  4:12 PM

## 2019-01-08 NOTE — PLAN OF CARE
Problem: Patient Care Overview  Goal: Plan of Care Review  Outcome: Ongoing (interventions implemented as appropriate)   01/08/19 7291   Coping/Psychosocial   Plan of Care Reviewed With patient   OTHER   Outcome Summary pt alert, oriented to self, turning q2h, skin intact, incont, falls risk, reg NCS diet, poss d/c tomorrow   Plan of Care Review   Progress improving     Goal: Individualization and Mutuality  Outcome: Ongoing (interventions implemented as appropriate)    Goal: Discharge Needs Assessment  Outcome: Ongoing (interventions implemented as appropriate)      Problem: Urinary Tract Infection (Adult)  Goal: Signs and Symptoms of Listed Potential Problems Will be Absent, Minimized or Managed (Urinary Tract Infection)  Outcome: Ongoing (interventions implemented as appropriate)    Goal: Signs and Symptoms of Listed Potential Problems Will be Absent, Minimized or Managed (Urinary Tract Infection)  Outcome: Ongoing (interventions implemented as appropriate)      Problem: Fall Risk (Adult)  Goal: Absence of Fall  Outcome: Ongoing (interventions implemented as appropriate)      Problem: Skin Injury Risk (Adult)  Goal: Skin Health and Integrity  Outcome: Ongoing (interventions implemented as appropriate)

## 2019-01-08 NOTE — THERAPY TREATMENT NOTE
Acute Care - Physical Therapy Treatment Note  [facility]     Patient Name: Dorothy A Ochsner  : 1935  MRN: [id number]  Today's Date: 2019     Date of Referral to PT: 19  Referring Physician: Dr. Montes De Oca    Admit Date: 2019    Visit Dx:    ICD-10-CM ICD-9-CM   1. Altered mental status, unspecified altered mental status type R41.82 780.97   2. Urinary tract infection without hematuria, site unspecified N39.0 599.0   3. Impaired functional mobility, balance, gait, and endurance Z74.09 V49.89   4. Oropharyngeal dysphagia R13.12 787.22     Patient Active Problem List   Diagnosis   • Hepatic encephalopathy (CMS/HCC)   • Cirrhosis of liver (CMS/HCC)   • Atrial fibrillation (CMS/HCC)   • Hypertension   • DNR (do not resuscitate)   • Long term current use of anticoagulant   • Alcoholic cirrhosis of liver (CMS/HCC)   • C. difficile colitis   • Metabolic encephalopathy   • Urinary tract infection due to ESBL Klebsiella   • Hypokalemia       Therapy Treatment    Rehabilitation Treatment Summary     Row Name 19 1400             Treatment Time/Intention    Discipline  physical therapy assistant  -CW      Document Type  therapy note (daily note)  -CW      Subjective Information  complains of;weakness  -CW      Mode of Treatment  physical therapy  -CW      Therapy Frequency (PT Clinical Impression)  daily  -CW      Patient Effort  fair  -CW      Existing Precautions/Restrictions  fall  -CW      Recorded by [CW] Jose Urrutia PTA 19 1442      Row Name 19 1400             Vital Signs    O2 Delivery Pre Treatment  room air  -CW      Recorded by [CW] Jose Urrutia PTA 19 1442      Row Name 19 1400             Cognitive Assessment/Intervention- PT/OT    Orientation Status (Cognition)  oriented to;person  -CW      Follows Commands (Cognition)  follows one step commands;25-49% accuracy;delayed response/completion;physical/tactile prompts required;repetition of directions  required;verbal cues/prompting required  -CW      Safety Deficit (Cognitive)  severe deficit;awareness of need for assistance;insight into deficits/self awareness;safety precautions follow-through/compliance  -CW      Personal Safety Interventions  fall prevention program maintained;gait belt;muscle strengthening facilitated;nonskid shoes/slippers when out of bed  -CW      Recorded by [CW] Jose Urrutia, PTA 01/08/19 1442      Row Name 01/08/19 1400             Bed Mobility Assessment/Treatment    Bed Mobility Assessment/Treatment  supine-sit;sit-supine  -CW      Supine-Sit [place] (Bed Mobility)  2 person assist;moderate assist (50% patient effort);verbal cues;nonverbal cues (demo/gesture)  -CW      Sit-Supine [place] (Bed Mobility)  2 person assist;maximum assist (25% patient effort)  -CW      Bed Mobility, Safety Issues  decreased use of arms for pushing/pulling;decreased use of legs for bridging/pushing;impaired trunk control for bed mobility;cognitive deficits limit understanding  -CW      Assistive Device (Bed Mobility)  bed rails;draw sheet  -CW      Recorded by [CW] Jose Urrutia, PTA 01/08/19 1442      Row Name 01/08/19 1400             Transfer Assessment/Treatment    Transfer Assessment/Treatment  sit-stand transfer;stand-sit transfer  -CW      Recorded by [CW] Jose Urrutia, PTA 01/08/19 1442      Row Name 01/08/19 1400             Sit-Stand Transfer    Sit-Stand [place] (Transfers)  2 person assist;moderate assist (50% patient effort);verbal cues;nonverbal cues (demo/gesture) HHA-2, plus R hand on rail, stood x 2 but unable to sidestep  -CW      Recorded by [CW] Jose Urrutia, PTA 01/08/19 1442      Row Name 01/08/19 1400             Stand-Sit Transfer    Stand-Sit [place] (Transfers)  2 person assist;minimum assist (75% patient effort);verbal cues;nonverbal cues (demo/gesture)  -CW      Recorded by [CW] Jose Urrutia, PTA 01/08/19 1442      Row Name  01/08/19 1400             Positioning and Restraints    Pre-Treatment Position  in bed  -CW      Post Treatment Position  bed  -CW      In Bed  notified nsg;supine;call light within reach;encouraged to call for assist;exit alarm on  -CW      Recorded by [CW] Jose Urrutia, PTA 01/08/19 1442      Row Name 01/08/19 1400             Pain Scale: Word Pre/Post-Treatment    Pain: Word Scale, Pretreatment  0 - no pain  -CW      Pain: Word Scale, Post-Treatment  0 - no pain  -CW      Recorded by [CW] Jose Urrutia, PTA 01/08/19 1442      Row Name 01/08/19 1400             Outcome Summary/Treatment Plan (PT)    Anticipated Discharge Disposition (PT)  skilled nursing facility  -CW      Recorded by [CW] Jose Urrutia, PTA 01/08/19 1442        User Key  (r) = Recorded By, (t) = Taken By, (c) = Cosigned By    Initials Name Effective Dates Discipline    CW Jose Urrutia, PTA 03/07/18 -  PT                   Physical Therapy Education     Title: PT OT SLP Therapies (In Progress)     Topic: Physical Therapy (In Progress)     Point: Mobility training (In Progress)     Learning Progress Summary           Patient Acceptance, E,TB, NR by ELLY at 1/8/2019  2:42 PM    Acceptance, E,TB, VU,DU by ELLY at 1/7/2019  4:18 PM    Acceptance, E,D, NR by ILEANA at 1/6/2019  6:31 PM    Nonacceptance, E, NL by WANG at 1/5/2019  5:44 PM    Comment:  ALTERED MENTAL STATUS R/T UTI    Nonacceptance, E,D, VU,NR,NL by DO at 1/5/2019 10:29 AM                   Point: Home exercise program (In Progress)     Learning Progress Summary           Patient Acceptance, E,TB, NR by ELLY at 1/8/2019  2:42 PM    Acceptance, E,TB, VU,DU by ELLY at 1/7/2019  4:18 PM    Acceptance, E,D, NR by ILEANA at 1/6/2019  6:31 PM    Nonacceptance, E, NL by WANG at 1/5/2019  5:44 PM    Comment:  ALTERED MENTAL STATUS R/T UTI    Nonacceptance, E,D, VU,NR,NL by DO at 1/5/2019 10:29 AM                   Point: Body mechanics (In Progress)     Learning Progress Summary            Patient Acceptance, E,TB, NR by CW at 1/8/2019  2:42 PM    Acceptance, E,TB, VU,DU by CW at 1/7/2019  4:18 PM    Acceptance, E,D, NR by  at 1/6/2019  6:31 PM    Nonacceptance, E, NL by WANG at 1/5/2019  5:44 PM    Comment:  ALTERED MENTAL STATUS R/T UTI    Nonacceptance, E,D, VU,NR,NL by DO at 1/5/2019 10:29 AM                   Point: Precautions (In Progress)     Learning Progress Summary           Patient Acceptance, E,TB, NR by CW at 1/8/2019  2:42 PM    Acceptance, E,TB, VU,DU by ELLY at 1/7/2019  4:18 PM    Acceptance, E,D, NR by  at 1/6/2019  6:31 PM    Nonacceptance, E, NL by WANG at 1/5/2019  5:44 PM    Comment:  ALTERED MENTAL STATUS R/T UTI    Nonacceptance, E,D, VU,NR,NL by DO at 1/5/2019 10:29 AM                               User Key     Initials Effective Dates Name Provider Type Discipline     03/07/18 -  Alisa Flores PTA Physical Therapy Assistant PT     06/16/16 -  Anya Tinoco RN Registered Nurse Nurse     03/07/18 -  Willis Valadez, PT Physical Therapist PT     03/07/18 -  Jose Urrutia PTA Physical Therapy Assistant PT                PT Recommendation and Plan  Anticipated Discharge Disposition (PT): skilled nursing facility  Therapy Frequency (PT Clinical Impression): daily  Outcome Summary/Treatment Plan (PT)  Anticipated Discharge Disposition (PT): skilled nursing facility  Plan of Care Reviewed With: patient  Progress: no change  Outcome Summary: Pt continues to have confusion impacting safety with bed mobility and other transfers.    Outcome Measures     Row Name 01/08/19 1400 01/07/19 1600 01/06/19 1800       How much help from another person do you currently need...    Turning from your back to your side while in flat bed without using bedrails?  2  -CW  2  -CW  3  -JM    Moving from lying on back to sitting on the side of a flat bed without bedrails?  2  -CW  2  -CW  2  -JM    Moving to and from a bed to a chair (including a wheelchair)?  1  -CW  1  -CW  1  -JM     Standing up from a chair using your arms (e.g., wheelchair, bedside chair)?  1  -CW  1  -CW  2  -JM    Climbing 3-5 steps with a railing?  1  -CW  1  -CW  1  -JM    To walk in hospital room?  1  -CW  1  -CW  1  -JM    AM-PAC 6 Clicks Score  8  -CW  8  -CW  10  -JM       Functional Assessment    Outcome Measure Options  AM-PAC 6 Clicks Basic Mobility (PT)  -CW  AM-PAC 6 Clicks Basic Mobility (PT)  -CW  --      User Key  (r) = Recorded By, (t) = Taken By, (c) = Cosigned By    Initials Name Provider Type    Alisa Duke PTA Physical Therapy Assistant    Jose Coronado PTA Physical Therapy Assistant         Time Calculation:   PT Charges     Row Name 01/08/19 1443             Time Calculation    Start Time  1427  -CW      Stop Time  1443  -CW      Time Calculation (min)  16 min  -CW      PT Received On  01/08/19  -CW      PT - Next Appointment  01/09/19  -CW        User Key  (r) = Recorded By, (t) = Taken By, (c) = Cosigned By    Initials Name Provider Type    Jose Coronado PTA Physical Therapy Assistant        Therapy Suggested Charges     Code   Minutes Charges    None           Therapy Charges for Today     Code Description Service Date Service Provider Modifiers Qty    91179084748 HC PT THER PROC EA 15 MIN 1/7/2019 Jose Urrutia PTA GP 1    27962800570 HC PT THER PROC EA 15 MIN 1/8/2019 Jose Urrutia PTA GP 1          PT G-Codes  Outcome Measure Options: AM-PAC 6 Clicks Basic Mobility (PT)  AM-PAC 6 Clicks Score: 8    Jose Urrutia PTA  1/8/2019

## 2019-01-09 VITALS
RESPIRATION RATE: 18 BRPM | TEMPERATURE: 98.8 F | SYSTOLIC BLOOD PRESSURE: 140 MMHG | BODY MASS INDEX: 35.51 KG/M2 | WEIGHT: 193 LBS | HEIGHT: 62 IN | OXYGEN SATURATION: 95 % | DIASTOLIC BLOOD PRESSURE: 60 MMHG | HEART RATE: 60 BPM

## 2019-01-09 PROBLEM — E87.6 HYPOKALEMIA: Status: RESOLVED | Noted: 2019-01-07 | Resolved: 2019-01-09

## 2019-01-09 LAB
AMMONIA BLD-SCNC: 49 UMOL/L (ref 11–51)
BACTERIA SPEC AEROBE CULT: NORMAL
BACTERIA SPEC AEROBE CULT: NORMAL

## 2019-01-09 PROCEDURE — 25010000002 ERTAPENEM PER 500 MG: Performed by: HOSPITALIST

## 2019-01-09 PROCEDURE — 82140 ASSAY OF AMMONIA: CPT | Performed by: HOSPITALIST

## 2019-01-09 RX ORDER — LACTULOSE 10 G/15ML
30 SOLUTION ORAL 2 TIMES DAILY
Start: 2019-01-09

## 2019-01-09 RX ORDER — POTASSIUM CHLORIDE 750 MG/1
20 CAPSULE, EXTENDED RELEASE ORAL DAILY
Qty: 2 CAPSULE | Refills: 0
Start: 2019-01-10 | End: 2019-01-11

## 2019-01-09 RX ADMIN — SODIUM CHLORIDE, PRESERVATIVE FREE 3 ML: 5 INJECTION INTRAVENOUS at 09:43

## 2019-01-09 RX ADMIN — ERTAPENEM SODIUM 1 G: 1 INJECTION, POWDER, LYOPHILIZED, FOR SOLUTION INTRAMUSCULAR; INTRAVENOUS at 11:40

## 2019-01-09 RX ADMIN — APIXABAN 2.5 MG: 2.5 TABLET, FILM COATED ORAL at 09:38

## 2019-01-09 RX ADMIN — FUROSEMIDE 40 MG: 40 TABLET ORAL at 09:37

## 2019-01-09 RX ADMIN — METOPROLOL SUCCINATE 12.5 MG: 25 TABLET, FILM COATED, EXTENDED RELEASE ORAL at 09:37

## 2019-01-09 RX ADMIN — POTASSIUM CHLORIDE 20 MEQ: 750 CAPSULE, EXTENDED RELEASE ORAL at 09:37

## 2019-01-09 RX ADMIN — Medication 1 CAPSULE: at 09:38

## 2019-01-09 RX ADMIN — RIFAXIMIN 550 MG: 550 TABLET ORAL at 09:38

## 2019-01-09 RX ADMIN — LACTULOSE 30 G: 10 SOLUTION ORAL at 09:38

## 2019-01-09 RX ADMIN — SODIUM CHLORIDE, PRESERVATIVE FREE 10 ML: 5 INJECTION INTRAVENOUS at 09:43

## 2019-01-09 NOTE — DISCHARGE SUMMARY
[facility]    Date of Discharge:  1/9/2019    PCP: Charly Ryder MD    Discharge Diagnosis:   Active Hospital Problems    Diagnosis Date Noted   • **Urinary tract infection due to ESBL Klebsiella [N39.0, B96.89] 01/07/2019   • Metabolic encephalopathy [G93.41] 01/04/2019   • Long term current use of anticoagulant [Z79.01] 11/02/2017   • Alcoholic cirrhosis of liver (CMS/HCC) [K70.30] 11/02/2017   • DNR (do not resuscitate) [Z66] 10/27/2017   • Hypertension [I10] 10/26/2017   • Hepatic encephalopathy (CMS/HCC) [K72.90] 10/26/2017      Resolved Hospital Problems    Diagnosis Date Noted Date Resolved   • Hypokalemia [E87.6] 01/07/2019 01/09/2019   • Leukocytosis [D72.829] 11/02/2017 01/07/2019     Procedures Performed       Consults     Date and Time Order Name Status Description    1/4/2019 1104 [facility] (on-call MD unless specified) Completed         Hospital Course  Please see history and physical for details. Patient is a 83 y.o. female initially admitted for confusion and was transferred here from [facility].  Metabolic encephalopathy was multifactorial secondary to urinary tract infection as well as hepatic encephalopathy.  This is also compounded by underlying dementia.  Patient seems to be back at baseline at this time.  Patient was initially started on IV Rocephin at admission though urine culture revealed ESBL etiology.  At that juncture I transitioned over to Invanz and patient will have tolerated 3 doses prior to discharge.  Will treat for an additional 7 days post discharge to complete a ten-day course.  She has a PICC line to right upper extremity and blood cultures have remained negative x2.  Her hypokalemia has been replaced and her magnesium levels are  therapeutic.  She does have a past history of alcoholic cirrhosis and did have some elevated ammonia levels of which she was started on Xifaxan.  Due to her past history of C. difficile and not trying  to provoke any GI issues I tried to back off on the lactulose to daily though it appears that twice daily dosing is ideal to keep her ammonia levels therapeutic.  She also has a past history of paroxysmal atrial fibrillation though she is rate controlled and she is anticoagulated on Eliquis.  At this juncture she is medically stable to be transition back to [facility] to complete her IV Invanz regimen.  Once the antibiotics is completed I suggest removal of PICC line to right upper extremity.  All questions answered to patient prior to disposition but no family members are noted at bedside at that time.      Condition on Discharge: Improved.     Temp:  [97.9 °F (36.6 °C)-98.8 °F (37.1 °C)] 98.8 °F (37.1 °C)  Heart Rate:  [60-66] 60  Resp:  [18] 18  BP: (138-142)/(60-68) 140/60  Body mass index is 35.29 kg/m².    Physical Exam   Constitutional: She appears well-developed and well-nourished.   Morbidly obese   Neck: Neck supple. No JVD present.   Cardiovascular: Normal rate and regular rhythm.   Pulmonary/Chest: Effort normal and breath sounds normal. No respiratory distress.   Abdominal: Soft. Bowel sounds are normal. She exhibits no distension. There is no tenderness. There is no guarding.   Neurological: She is alert. No cranial nerve deficit.        Discharge Medications      New Medications      Instructions Start Date   ertapenem 1 gm/100ml solution IV  Commonly known as:  INVanz   1 g, Intravenous, Every 24 Hours      rifaximin 550 MG tablet  Commonly known as:  XIFAXAN   550 mg, Oral, Every 12 Hours Scheduled         Changes to Medications      Instructions Start Date   potassium chloride 10 MEQ CR capsule  Commonly known as:  MICRO-K  What changed:    · how much to take  · when to take this   20 mEq, Oral, Daily         Continue These Medications      Instructions Start Date   apixaban 5 MG tablet tablet  Commonly known as:  ELIQUIS   2.5 mg, Oral, 2 Times Daily      bimatoprost 0.01 % ophthalmic  drops  Commonly known as:  LUMIGAN   1 drop, Both Eyes, Nightly      ferrous sulfate 325 (65 FE) MG tablet   325 mg, Oral, Daily With Breakfast      FLORANEX pack oral packet   Oral, Daily      furosemide 40 MG tablet  Commonly known as:  LASIX   40 mg, Oral, Every Other Day      lactulose 10 GM/15ML solution  Commonly known as:  CHRONULAC   30 g, Oral, 2 Times Daily      metoprolol succinate XL 25 MG 24 hr tablet  Commonly known as:  TOPROL-XL   12.5 mg, Oral, Every 12 Hours      MULTIVITAMIN ADULT PO   1 capsule, Oral, Daily              Additional Instructions for the Follow-ups that You Need to Schedule     Discharge Follow-up with PCP   As directed       Currently Documented PCP:    Charly Ryder MD    PCP Phone Number:    [phone]     Follow Up Details:  pcp 2 weeks - keep all other previously scheduled appts            Contact information for follow-up providers     Charly Ryder MD .    Specialty:  Internal Medicine  Why:  pcp 2 weeks - keep all other previously scheduled appts  Contact information:  [address] [address] [phone]                   Contact information for after-discharge care     Destination     [facility] .    Service:  Intermediate Care  Contact information:  [address] [facility] [address] [phone]                           Test Results Pending at Discharge   Order Current Status    Blood Culture - Blood, Arm, Left Preliminary result         Max Hernandez MD  01/09/19  10:07 AM    Discharge time spent greater than 30 minutes.

## 2019-01-09 NOTE — SIGNIFICANT NOTE
01/09/19 1011   Rehab Treatment   Discipline physical therapy assistant   Reason Treatment Not Performed (Pt d/c to SNF today)

## 2019-01-09 NOTE — PLAN OF CARE
Problem: Patient Care Overview  Goal: Plan of Care Review  Outcome: Ongoing (interventions implemented as appropriate)   01/09/19 0542   Coping/Psychosocial   Plan of Care Reviewed With patient   OTHER   Outcome Summary vss, denies pain. large bm this am. picc with good blood return. to check ammonia level this am. possible d/c today   Plan of Care Review   Progress improving     Goal: Individualization and Mutuality  Outcome: Ongoing (interventions implemented as appropriate)    Goal: Discharge Needs Assessment  Outcome: Ongoing (interventions implemented as appropriate)    Goal: Interprofessional Rounds/Family Conf  Outcome: Ongoing (interventions implemented as appropriate)      Problem: Urinary Tract Infection (Adult)  Goal: Signs and Symptoms of Listed Potential Problems Will be Absent, Minimized or Managed (Urinary Tract Infection)  Outcome: Ongoing (interventions implemented as appropriate)    Goal: Signs and Symptoms of Listed Potential Problems Will be Absent, Minimized or Managed (Urinary Tract Infection)  Outcome: Ongoing (interventions implemented as appropriate)      Problem: Fall Risk (Adult)  Goal: Absence of Fall  Outcome: Ongoing (interventions implemented as appropriate)      Problem: Skin Injury Risk (Adult)  Goal: Skin Health and Integrity  Outcome: Ongoing (interventions implemented as appropriate)

## 2019-01-09 NOTE — PROGRESS NOTES
Discharge Planning Assessment  [facility]     Patient Name: Dorothy A Ochsner  MRN: [id number]  Today's Date: 1/9/2019    Admit Date: 1/4/2019    Discharge Needs Assessment    No documentation.       Discharge Plan     Row Name 01/09/19 1024       Plan    Plan Comments  Se with [facility] updated on discharge and ([facility]) Ambulance transport arranged for 12:30PM, Se said not to worry about faxing the discharge summary. Se confirmed pt will return skilled due to IV abx.   I notified pt's son Wayne Ochsner [phone] I reviewed the IMM Letter and the appeal rights with him, he declined the number to appeal the discharge, he said he will let his father / pt's  Jay know about the discharge so he won't go looking for her at [facility], he said his father is so Jena he can't hear over the phone.              Amanda Hunt RN    Final Discharge Disposition Code  03 - skilled nursing facility (SNF)    Row Name 01/09/19 1023       Plan    Plan  Return to [facility] where she lives long term care        Destination - Selection Complete      Service Provider Request Status Selected Services Address Phone Number Fax Number    [facility] Selected Skilled Nursing [address] [facility] [address] [phone]          Amanda Hunt, RN

## 2020-06-17 NOTE — PROGRESS NOTES
Woke up with symptoms?: yes    Recent bleeding noted: no  Does the patient take any Blood Thinners? no  Medications: No relevant medications      Past Medical History:   Past Medical History:   Diagnosis Date    Hypertension      Past Surgical History: no major surgeries within the last 2 weeks    Family History: no relevant history    Social History: no smoking, no drinking, no drugs    Allergies: Penicillins No relevant allergies    Review of Systems   Constitutional: Negative for appetite change.   HENT: Negative for congestion.    Eyes: Negative for discharge.   Respiratory: Negative for shortness of breath.    Cardiovascular: Negative for chest pain.   Gastrointestinal: Negative for abdominal pain.   Endocrine: Negative for cold intolerance.   Genitourinary: Negative for difficulty urinating.   Musculoskeletal: Negative for joint swelling.   Skin: Negative for color change.     Objective:   Vitals:  /95, , RR 12, o2 97    CT READ: Yes  No hemmorhage. No mass effect. No early infarct signs.     Physical Exam  Constitutional:       General: She is not in acute distress.  HENT:      Head: Normocephalic.      Right Ear: External ear normal.      Left Ear: External ear normal.   Eyes:      Conjunctiva/sclera: Conjunctivae normal.   Neck:      Musculoskeletal: Normal range of motion.   Pulmonary:      Effort: Pulmonary effort is normal.      Breath sounds: No stridor.   Abdominal:      Tenderness: There is no abdominal tenderness.   Skin:     General: Skin is dry.      Findings: No rash.            "  Infectious Diseases Progress Note    Andrew Sandhu MD     [facility]  [place]: 7 days  Patient Identification:  Name: Dorothy A [facility]  Age: 81 y.o.  Sex: female  :  1935  MRN: [id number]         Primary Care Physician: Charly Ryder MD            Subjective: Per  she is more lucid and awake and looks better patient herself says that she's feeling better.  Claims that her appetite is better  Interval History: See consultation note    Objective:    Scheduled Meds:    aluminum sulfate-calcium acetate 1 packet Topical Q8H   apixaban 5 mg Oral BID   ertapenem 1 g Intravenous Q24H   lactulose 30 g Oral TID   latanoprost 1 drop Both Eyes Nightly   metoprolol succinate XL 12.5 mg Oral Q12H   multivitamin with minerals 1 tablet Oral Daily   pantoprazole 40 mg Oral QAM   rifAXIMin 550 mg Oral Q12H     Continuous Infusions:     Vital signs in last 24 hours:  Temp:  [97.5 °F (36.4 °C)-101.9 °F (38.8 °C)] 97.5 °F (36.4 °C)  Heart Rate:  [62-86] 70  Resp:  [16] 16  BP: (134-147)/(49-59) 134/59    Intake/Output:    Intake/Output Summary (Last 24 hours) at 17 1114  Last data filed at 17 1700   Gross per 24 hour   Intake              450 ml   Output                0 ml   Net              450 ml       Exam:  /59 (BP Location: Right arm, Patient Position: Lying)  Pulse 70  Temp 97.5 °F (36.4 °C) (Oral)   Resp 16  Ht 62\" (157.5 cm)  Wt 193 lb 11.2 oz (87.9 kg)  SpO2 100%  BMI 35.43 kg/m2    General Appearance:    Awake and interactive chronically ill but does not appear toxic                          Head:    Normocephalic, without obvious abnormality, atraumatic                           Eyes:    PERRL, conjunctiva/corneas clear, EOM's intact, both eyes                         Throat:   Lips, tongue, gums normal; oral mucosa pink and moist                           Neck:   Supple, symmetrical, trachea midline, no JVD                         Lungs:    Clear to auscultation " bilaterally, respirations unlabored                 Chest Wall:    No tenderness or deformity                          Heart:    Regular rate and rhythm, S1 and S2 normal, no murmur,no  Rub                                      or gallop                  Abdomen:     Soft, non-tender, bowel sounds active, no masses, no                                                        organomegaly                  Extremities:   Extremities normal, atraumatic, no cyanosis or edema                        Pulses:   Pulses palpable in all extremities                            Skin:   Skin is warm and dry,  no rashes or palpable lesions              Data Review:    I reviewed the patient's new clinical results.    Results from last 7 days  Lab Units 11/02/17  0424 11/01/17  0425 10/31/17  0413 10/30/17  0615 10/29/17  0450 10/28/17  0441   WBC 10*3/mm3 17.14* 13.06* 11.00* 10.21 13.12* 13.97*   HEMOGLOBIN g/dL 10.3* 10.5* 10.6* 11.2* 10.4* 11.1*   PLATELETS 10*3/mm3 150 145 132* 110* 107* 156       Results from last 7 days  Lab Units 11/02/17  0424 11/01/17  0425 10/31/17  1200 10/31/17  0413 10/30/17  0615 10/29/17  0450 10/28/17  0441 10/26/17  1143   SODIUM mmol/L 138 135*  --  136 139 143 145 142   POTASSIUM mmol/L 3.8 3.9 3.7 3.4* 3.6 3.4* 3.3* 3.9   CHLORIDE mmol/L 100 97*  --  99 100 106 106 103   CO2 mmol/L 28.1 25.2  --  26.6 28.6 25.0 23.3 26.7   BUN mg/dL 23 22  --  23 24* 22 21 16   CREATININE mg/dL 1.04* 0.92  --  0.90 1.13* 1.17* 1.18* 1.04*   CALCIUM mg/dL 9.1 9.1  --  8.9 9.6 9.8 10.0 10.1   GLUCOSE mg/dL 113* 91  --  100* 141* 109* 139* 103*       Microbiology Results (last 10 days)     Procedure Component Value - Date/Time    Urine Culture - Urine, Urine, Clean Catch [[id number]]  (Abnormal)  (Susceptibility) Collected:  10/26/17 1225    Lab Status:  Final result Specimen:  Urine from Urine, Clean Catch Updated:  10/28/17 0953     Urine Culture --      >100,000 CFU/mL Escherichia coli (A)    Susceptibility       Escherichia coli     [place]     Amikacin <=2 ug/ml Susceptible     Ampicillin >=32 ug/ml Resistant     Ampicillin + Sulbactam >=32 ug/ml Resistant     Cefazolin >=64 ug/ml Resistant     Cefepime 2 ug/ml Susceptible     Ceftriaxone >=64 ug/ml Resistant     Ciprofloxacin >=4 ug/ml Resistant     Ertapenem <=0.5 ug/ml Susceptible     Gentamicin >=16 ug/ml Resistant     Levofloxacin >=8 ug/ml Resistant     Nitrofurantoin <=16 ug/ml Susceptible     Piperacillin + Tazobactam <=4 ug/ml Susceptible     Tetracycline >=16 ug/ml Resistant     Trimethoprim + Sulfamethoxazole >=320 ug/ml Resistant                          Assessment:  Principal Problem:    Hepatic encephalopathy  Active Problems:    Cirrhosis of liver    Atrial fibrillation    UTI (urinary tract infection)    Hypertension    DNR (do not resuscitate)      Plan:  She has received proper treatment for urinary tract infection.  And in fact she is showing worsening while on antibiotic treatment directed towards UTI.  The concern is whether she is developing competitions of treatment such as C. difficile infection or having recurrent aspiration or may have developed obstructive focus on undrained focus that is causing this change in status manifesting as fever and leukocytosis while on treatment.  He is also clinical discrepancy between how she feels and looks and how her family see her progressing and the lab results.  Check CT scan chest and abdomen for this progressive leukocytosis and fever on appropriate antibiotics treatment.    DC Invanz follow-up on this stool studies for C. difficile and blood cultures.    Andrew Sandhu MD  11/2/2017  11:14 AM    Much of this encounter note is an electronic transcription/translation of spoken language to printed text. The electronic translation of spoken language may permit erroneous, or at times, nonsensical words or phrases to be inadvertently transcribed; Although I have reviewed the note for such errors, some may still  exist

## 2022-12-14 NOTE — PROGRESS NOTES
[facility]     LOS: 9 days     Name: Dorothy A [facility]  Age: 81 y.o.  Sex: female  :  1935  MRN: [id number]         Primary Care Physician: Charly Ryder MD    Diet Dysphagia; IV - Mechanical Soft No Mixed Consistencies; Thin    Subjective   Patient denies complaints. Denies nausea, abdominal pain or chest pain, shortness of breath.     Temp:  [98.1 °F (36.7 °C)-99.2 °F (37.3 °C)] 98.6 °F (37 °C)  Heart Rate:  [59-91] 60  Resp:  [18-20] 20  BP: (114-155)/(36-70) 155/53  SpO2:  [97 %] 97 %  on   ;   O2 Device: room air  Body mass index is 35.43 kg/(m^2).    Physical Exam   Constitutional: No distress.   Cardiovascular: Normal rate and regular rhythm.    Pulmonary/Chest: Effort normal and breath sounds normal. No respiratory distress.   Abdominal: Soft. There is no tenderness. There is no rebound and no guarding.   Musculoskeletal: She exhibits edema (chronic changes).   Neurological: She is alert.   Oriented to hospital, not year   Skin: Skin is warm and dry.     Reviewed medications and new clinical results    aluminum sulfate-calcium acetate 1 packet Topical Q8H   apixaban 5 mg Oral BID   lactulose 30 g Oral TID   latanoprost 1 drop Both Eyes Nightly   metoprolol succinate XL 12.5 mg Oral Q12H   multivitamin with minerals 1 tablet Oral Daily   pantoprazole 40 mg Oral QAM   rifAXIMin 550 mg Oral Q12H   vancomycin 125 mg Oral Q6H        Results from last 7 days  Lab Units 17  0403 17  0407 17  0424 17  0425 10/31/17  0413 10/30/17  0615 10/29/17  0450   WBC 10*3/mm3 11.43* 14.23* 17.14* 13.06* 11.00* 10.21 13.12*   HEMOGLOBIN g/dL 10.0* 10.1* 10.3* 10.5* 10.6* 11.2* 10.4*   PLATELETS 10*3/mm3 165 140 150 145 132* 110* 107*       Results from last 7 days  Lab Units 17  0403 17  0407 17  0424 17  0425 10/31/17  1200 10/31/17  0413 10/30/17  0615 10/29/17  0450   SODIUM mmol/L 139 139 138 135*  --  136 139 143    POTASSIUM mmol/L 3.7 3.5 3.8 3.9 3.7 3.4* 3.6 3.4*   CHLORIDE mmol/L 101 102 100 97*  --  99 100 106   CO2 mmol/L 28.5 26.5 28.1 25.2  --  26.6 28.6 25.0   BUN mg/dL 17 18 23 22  --  23 24* 22   CREATININE mg/dL 0.90 0.86 1.04* 0.92  --  0.90 1.13* 1.17*   CALCIUM mg/dL 9.4 9.2 9.1 9.1  --  8.9 9.6 9.8   GLUCOSE mg/dL 122* 126* 113* 91  --  100* 141* 109*     Estimated Creatinine Clearance: 50.5 mL/min (by C-G formula based on Cr of 0.9).    Assessment/Plan   Active Hospital Problems (** Indicates Principal Problem)    Diagnosis Date Noted   • **Hepatic encephalopathy [K72.90] 10/26/2017   • C. difficile colitis [A04.72] 11/03/2017   • Leukocytosis [D72.829] 11/02/2017   • Long term current use of anticoagulant [Z79.01] 11/02/2017   • Alcoholic cirrhosis of liver [K70.30] 11/02/2017   • DNR (do not resuscitate) [Z66] 10/27/2017   • Cirrhosis of liver [K74.60] 10/26/2017   • Atrial fibrillation [I48.91] 10/26/2017   • UTI (urinary tract infection) [N39.0] 10/26/2017   • Hypertension [I10] 10/26/2017      Resolved Hospital Problems    Diagnosis Date Noted Date Resolved   No resolved problems to display.     · Encephalopathy improved/stable  · Vanc PO for c.diff. Leukocytosis improving.   · Waiting for labs re: blood in urine, stool. Hgb stable  · On eliquis for afib    Micah Will MD   11/04/17  4:53 PM     Price (Do Not Change): 0.00 Instructions: This plan will send the code FBSE to the PM system.  DO NOT or CHANGE the price. Detail Level: Simple